# Patient Record
Sex: MALE | Race: WHITE | Employment: FULL TIME | URBAN - METROPOLITAN AREA
[De-identification: names, ages, dates, MRNs, and addresses within clinical notes are randomized per-mention and may not be internally consistent; named-entity substitution may affect disease eponyms.]

---

## 2017-01-16 ENCOUNTER — OFFICE VISIT (OUTPATIENT)
Dept: FAMILY MEDICINE | Facility: CLINIC | Age: 62
End: 2017-01-16
Payer: COMMERCIAL

## 2017-01-16 VITALS
BODY MASS INDEX: 44.71 KG/M2 | OXYGEN SATURATION: 93 % | HEIGHT: 68 IN | SYSTOLIC BLOOD PRESSURE: 135 MMHG | HEART RATE: 88 BPM | DIASTOLIC BLOOD PRESSURE: 88 MMHG | WEIGHT: 295 LBS

## 2017-01-16 DIAGNOSIS — J41.8 MIXED SIMPLE AND MUCOPURULENT CHRONIC BRONCHITIS (H): ICD-10-CM

## 2017-01-16 DIAGNOSIS — I10 BENIGN ESSENTIAL HYPERTENSION: ICD-10-CM

## 2017-01-16 DIAGNOSIS — Z12.11 COLON CANCER SCREENING: ICD-10-CM

## 2017-01-16 DIAGNOSIS — I10 ESSENTIAL HYPERTENSION WITH GOAL BLOOD PRESSURE LESS THAN 140/90: ICD-10-CM

## 2017-01-16 DIAGNOSIS — R80.9 TYPE 2 DIABETES MELLITUS WITH MICROALBUMINURIA, WITHOUT LONG-TERM CURRENT USE OF INSULIN (H): Primary | ICD-10-CM

## 2017-01-16 DIAGNOSIS — R07.89 ATYPICAL CHEST PAIN: ICD-10-CM

## 2017-01-16 DIAGNOSIS — E11.29 TYPE 2 DIABETES MELLITUS WITH MICROALBUMINURIA, WITHOUT LONG-TERM CURRENT USE OF INSULIN (H): Primary | ICD-10-CM

## 2017-01-16 DIAGNOSIS — E78.5 HYPERLIPIDEMIA LDL GOAL <100: ICD-10-CM

## 2017-01-16 DIAGNOSIS — M25.512 CHRONIC LEFT SHOULDER PAIN: ICD-10-CM

## 2017-01-16 DIAGNOSIS — G89.29 CHRONIC LEFT SHOULDER PAIN: ICD-10-CM

## 2017-01-16 LAB
ANION GAP SERPL CALCULATED.3IONS-SCNC: 8 MMOL/L (ref 3–14)
BUN SERPL-MCNC: 12 MG/DL (ref 7–30)
CALCIUM SERPL-MCNC: 8.6 MG/DL (ref 8.5–10.1)
CHLORIDE SERPL-SCNC: 104 MMOL/L (ref 94–109)
CHOLEST SERPL-MCNC: 236 MG/DL
CO2 SERPL-SCNC: 28 MMOL/L (ref 20–32)
CREAT SERPL-MCNC: 0.93 MG/DL (ref 0.66–1.25)
CREAT UR-MCNC: 90 MG/DL
GFR SERPL CREATININE-BSD FRML MDRD: 83 ML/MIN/1.7M2
GLUCOSE SERPL-MCNC: 171 MG/DL (ref 70–99)
HBA1C MFR BLD: 6.6 % (ref 4.3–6)
HDLC SERPL-MCNC: 51 MG/DL
LDLC SERPL CALC-MCNC: ABNORMAL MG/DL
LDLC SERPL DIRECT ASSAY-MCNC: 147 MG/DL
MICROALBUMIN UR-MCNC: 68 MG/L
MICROALBUMIN/CREAT UR: 76 MG/G CR (ref 0–17)
NONHDLC SERPL-MCNC: 185 MG/DL
POTASSIUM SERPL-SCNC: 4.1 MMOL/L (ref 3.4–5.3)
SODIUM SERPL-SCNC: 140 MMOL/L (ref 133–144)
TRIGL SERPL-MCNC: 608 MG/DL

## 2017-01-16 PROCEDURE — 99214 OFFICE O/P EST MOD 30 MIN: CPT | Performed by: PHYSICIAN ASSISTANT

## 2017-01-16 PROCEDURE — 83721 ASSAY OF BLOOD LIPOPROTEIN: CPT | Mod: 59 | Performed by: PHYSICIAN ASSISTANT

## 2017-01-16 PROCEDURE — 80048 BASIC METABOLIC PNL TOTAL CA: CPT | Performed by: PHYSICIAN ASSISTANT

## 2017-01-16 PROCEDURE — 36415 COLL VENOUS BLD VENIPUNCTURE: CPT | Performed by: PHYSICIAN ASSISTANT

## 2017-01-16 PROCEDURE — 82043 UR ALBUMIN QUANTITATIVE: CPT | Performed by: PHYSICIAN ASSISTANT

## 2017-01-16 PROCEDURE — 80061 LIPID PANEL: CPT | Performed by: PHYSICIAN ASSISTANT

## 2017-01-16 PROCEDURE — 83036 HEMOGLOBIN GLYCOSYLATED A1C: CPT | Performed by: PHYSICIAN ASSISTANT

## 2017-01-16 PROCEDURE — 99207 C FOOT EXAM  NO CHARGE: CPT | Performed by: PHYSICIAN ASSISTANT

## 2017-01-16 RX ORDER — LOSARTAN POTASSIUM 100 MG/1
100 TABLET ORAL DAILY
Qty: 90 TABLET | Refills: 1 | Status: SHIPPED | OUTPATIENT
Start: 2017-01-16 | End: 2017-10-31

## 2017-01-16 NOTE — PROGRESS NOTES
SUBJECTIVE:                                                    Cooper Vargas is a 61 year old male who presents to clinic today for the following health issues:      Diabetes Follow-up      Patient is checking blood sugars: not at all    Diabetic concerns: None     Symptoms of hypoglycemia (low blood sugar): none     Paresthesias (numbness or burning in feet) or sores: Yes both     Date of last diabetic eye exam: unsure--does not want referral placed     COPD Follow-Up    Symptoms are currently: stable    Current fatigue or dyspnea with ambulation: none    Shortness of breath: stable    Increased or change in Cough/Sputum: No    Fever(s): No    Baseline ambulation without stopping to rest  feet, . Able to walk up 0 flights of stairs without stopping to rest.    Any ER/UC or hospital admissions since your last visit? No     History   Smoking status     Former Smoker -- 1.00 packs/day for 42 years     Types: Cigarettes     Quit date: 09/29/2015   Smokeless tobacco     Never Used     No results found for this basename: FEV1, OAW1WBA, has seen pulmonology. Copd stable. Inhalers have never been very helpful or too expensive.        Amount of exercise or physical activity: None    Problems taking medications regularly: No    Medication side effects: none  Diet: regular (no restrictions)  Joint Pain     Onset: chronic     Description:   Location: left shoulder  Character: Sharp and Dull ache    Intensity: moderate    Progression of Symptoms: same    Accompanying Signs & Symptoms:  Other symptoms: none   History:   Previous similar pain: YES      Precipitating factors:   Trauma or overuse: no     Alleviating factors:  Improved by: cortisone injection       Therapies Tried and outcome: cortisone injection    Worse with activity. Pain deep within shoulder.  Hypertension Follow-up      Outpatient blood pressures are not being checked.    Low Salt Diet: not monitoring salt     Occasional chest pressure. Long standing LONGORIA.    Problem list and histories reviewed & adjusted, as indicated.  Additional history: as documented    Problem list, Medication list, Allergies, and Medical/Social/Surgical histories reviewed in EPIC and updated as appropriate.    Patient Active Problem List   Diagnosis     GERD (gastroesophageal reflux disease)     Plantar fasciitis     Fatigue     Helicobacter pylori infection     Lyme disease     Smoking     Obesity     Tremors, hands     Hyperlipidemia LDL goal <100     LISETTE (obstructive sleep apnea)-Moderate (AHI 21)     Advanced directives, counseling/discussion     Acute gouty arthritis     Bronchitis, mucopurulent recurrent (H)     Mixed simple and mucopurulent chronic bronchitis (H)     Essential hypertension with goal blood pressure less than 140/90     Type 2 diabetes mellitus with microalbuminuria (H)     Social History   Substance Use Topics     Smoking status: Former Smoker -- 1.00 packs/day for 42 years     Types: Cigarettes     Quit date: 09/29/2015     Smokeless tobacco: Never Used     Alcohol Use: 0.0 oz/week     0 Standard drinks or equivalent per week      Comment: social drinks on the weekends.12 drinks (imelda)weekly     All other systems negative except as outline above  OBJECTIVE:    Eye exam - right eye normal lid, conjunctiva, cornea, pupil and fundus, left eye normal lid, conjunctiva, cornea, pupil and fundus.  Thyroid not palpable, not enlarged, no nodules detected.  CHEST:chest clear to IPPA, no tachypnea, retractions or cyanosis and S1, S2 normal, no murmur, no gallop, rate regular.  Shoulder exam shows positive impingement signs are present with pain at high arc of abduction and forward flexion on left. There is tenderness of the shoulder with movement. Pain is deep.  No edema  Foot exam - both sides normal; no swelling, tenderness or skin or vascular lesions. Color and temperature is normal. Sensation is intact. Peripheral pulses are palpable. Toenails are normal.    Cooper was seen  today for copd, shoulder left and diabetes.    Diagnoses and all orders for this visit:    Type 2 diabetes mellitus with microalbuminuria, without long-term current use of insulin (H)  -     Hemoglobin A1c  -     Albumin Random Urine Quantitative  -     FOOT EXAM    Essential hypertension with goal blood pressure less than 140/90  -     Basic metabolic panel  (Ca, Cl, CO2, Creat, Gluc, K, Na, BUN)    Mixed simple and mucopurulent chronic bronchitis (H)    Hyperlipidemia LDL goal <100  -     Lipid panel reflex to direct LDL    Colon cancer screening  -     GASTROENTEROLOGY ADULT REFERRAL +/- PROCEDURE    Benign essential hypertension  -     losartan (COZAAR) 100 MG tablet; Take 1 tablet (100 mg) by mouth daily    Atypical chest pain  -     Echo Stress Test With Definity; Future    Chronic left shoulder pain  -     MR Shoulder Left w/o & w Contrast; Future      work on lifestyle modification  Recheck in 6 mos

## 2017-01-16 NOTE — MR AVS SNAPSHOT
After Visit Summary   1/16/2017    Cooper Vargas    MRN: [id number]           Patient Information     Date Of Birth          1955        Visit Information        Provider Department      1/16/2017 9:00 AM Max Orantes PA-C [facility] [place]        Today's Diagnoses     Type 2 diabetes mellitus with microalbuminuria, without long-term current use of insulin (H)    -  1     Essential hypertension with goal blood pressure less than 140/90         Mixed simple and mucopurulent chronic bronchitis (H)         Hyperlipidemia LDL goal <100         Colon cancer screening         Benign essential hypertension         Atypical chest pain         Chronic left shoulder pain            Follow-ups after your visit        Additional Services     GASTROENTEROLOGY ADULT REFERRAL +/- PROCEDURE       Your provider has referred you to Gastroenterology Services.    English    Procedure/Referral: PROCEDURE ONLY - COLONOSCOPY - Reason for procedure: Screening  FMG: [facility] [phone]   [email or web address]  Colonoscopy Diagnostic Referral - Special Risks for Colonoscopy Procedures:    History of CHF: No  History of Ascites: No  Taking Blood Thinners: No (Should be off Warfarin x 1 week, ASA off x 2 weeks & Plavix off x 3 weeks)  Prosthetic Heart Valve present: No  History of Endocarditis: No  Major Orthopedic Prosthesis in place: No     Body mass index is 44.86 kg/(m^2).   Diabetic: Yes  Defibrillator: No  Sleep Apnea/CPAP:YES     Please be aware that coverage of these services is subject to the terms and limitations of your health insurance plan.  Call member services at your health plan with any benefit or coverage questions.  Any procedures must be performed at a [place] facility OR coordinated by your clinic's referral office.    Please bring the following with you to your appointment:    (1) Any X-Rays, CTs or MRIs which have been performed.  Contact  "the facility where they were done to arrange for  prior to your scheduled appointment.    (2) List of current medications   (3) This referral request   (4) Any documents/labs given to you for this referral                  Future tests that were ordered for you today     Open Future Orders        Priority Expected Expires Ordered    Echo Stress Test With Definity Routine  2018    MR Shoulder Left w/o & w Contrast Routine  2018            Who to contact     Normal or non-critical lab and imaging results will be communicated to you by STAR FESTIVALhart, letter or phone within 4 business days after the clinic has received the results. If you do not hear from us within 7 days, please contact the clinic through Medxnotet or phone. If you have a critical or abnormal lab result, we will notify you by phone as soon as possible.  Submit refill requests through VocalizeLocal or call your pharmacy and they will forward the refill request to us. Please allow 3 business days for your refill to be completed.          If you need to speak with a  for additional information , please call: [phone]             Additional Information About Your Visit        VocalizeLocal Information     VocalizeLocal lets you send messages to your doctor, view your test results, renew your prescriptions, schedule appointments and more. To sign up, go to [email or web address] . Click on \"Log in\" on the left side of the screen, which will take you to the Welcome page. Then click on \"Sign up Now\" on the right side of the page.     You will be asked to enter the access code listed below, as well as some personal information. Please follow the directions to create your username and password.     Your access code is: [email or web address]  Expires: 2017 10:19 AM     Your access code will  in 90 days. If you need help or a new code, please call your [place] clinic or [phone].        Care EveryWhere ID     This is your " "Care EveryWhere ID. This could be used by other organizations to access your [facility] medical records  [id number]        Your Vitals Were     Pulse Height BMI (Body Mass Index) Pulse Oximetry          88 5' 8\" (1.727 m) 44.86 kg/m2 93%         Blood Pressure from Last 3 Encounters:   01/16/17 135/88   07/19/16 116/80   03/21/16 132/88    Weight from Last 3 Encounters:   01/16/17 295 lb (133.811 kg)   07/19/16 282 lb (127.914 kg)   03/21/16 280 lb (127.007 kg)              We Performed the Following     Albumin Random Urine Quantitative     Basic metabolic panel  (Ca, Cl, CO2, Creat, Gluc, K, Na, BUN)     FOOT EXAM     GASTROENTEROLOGY ADULT REFERRAL +/- PROCEDURE     Hemoglobin A1c     Lipid panel reflex to direct LDL          Today's Medication Changes          These changes are accurate as of: 1/16/17 10:19 AM.  If you have any questions, ask your nurse or doctor.               Stop taking these medicines if you haven't already. Please contact your care team if you have questions.     ipratropium - albuterol 0.5 mg/2.5 mg/3 mL 0.5-2.5 (3) MG/3ML neb solution   Commonly known as:  DUONEB   Stopped by:  Max Orantes PA-C           predniSONE 10 MG tablet   Commonly known as:  DELTASONE   Stopped by:  Max Orantes PA-C                Where to get your medicines      These medications were [facility] [place] [facility] [address] [address] [address]     Phone:  [phone]    - losartan 100 MG tablet             Primary Care Provider Office Phone # Fax #    Max Orantes PA-C [phone]       [facility] [id number] [place] [address] [address]  [place] [address]        Thank you!     Thank you for choosing [facility]  for your care. Our goal is always to provide you with excellent care. Hearing back from our patients is one way we can continue to improve our services. Please take a few minutes to complete the written survey that you " may receive in the mail after your visit with us. Thank you!             Your Updated Medication List - Protect others around you: Learn how to safely use, store and throw away your medicines at www.disposemymeds.org.          This list is accurate as of: 1/16/17 10:19 AM.  Always use your most recent med list.                   Brand Name Dispense Instructions for use    losartan 100 MG tablet    COZAAR    90 tablet    Take 1 tablet (100 mg) by mouth daily

## 2017-01-17 ENCOUNTER — RADIANT APPOINTMENT (OUTPATIENT)
Dept: MRI IMAGING | Facility: CLINIC | Age: 62
End: 2017-01-17
Attending: PHYSICIAN ASSISTANT
Payer: COMMERCIAL

## 2017-01-17 DIAGNOSIS — M25.512 CHRONIC LEFT SHOULDER PAIN: ICD-10-CM

## 2017-01-17 DIAGNOSIS — G89.29 CHRONIC LEFT SHOULDER PAIN: ICD-10-CM

## 2017-01-17 PROCEDURE — 73221 MRI JOINT UPR EXTREM W/O DYE: CPT | Mod: TC

## 2017-01-23 ENCOUNTER — TELEPHONE (OUTPATIENT)
Dept: FAMILY MEDICINE | Facility: CLINIC | Age: 62
End: 2017-01-23

## 2017-01-23 DIAGNOSIS — G47.33 OSA (OBSTRUCTIVE SLEEP APNEA): Primary | ICD-10-CM

## 2017-01-23 NOTE — TELEPHONE ENCOUNTER
Patient states he needs a prescription for hoses and nose guard for his CPAP called in to [phone].    Thank you.

## 2017-01-24 ENCOUNTER — RADIANT APPOINTMENT (OUTPATIENT)
Dept: CARDIOLOGY | Facility: CLINIC | Age: 62
End: 2017-01-24
Attending: PHYSICIAN ASSISTANT
Payer: COMMERCIAL

## 2017-01-24 DIAGNOSIS — R07.89 ATYPICAL CHEST PAIN: ICD-10-CM

## 2017-01-24 PROCEDURE — 93325 DOPPLER ECHO COLOR FLOW MAPG: CPT | Mod: TC | Performed by: INTERNAL MEDICINE

## 2017-01-24 PROCEDURE — 93325 DOPPLER ECHO COLOR FLOW MAPG: CPT | Mod: 26 | Performed by: INTERNAL MEDICINE

## 2017-01-24 PROCEDURE — 93352 ADMIN ECG CONTRAST AGENT: CPT | Performed by: INTERNAL MEDICINE

## 2017-01-24 PROCEDURE — 93017 CV STRESS TEST TRACING ONLY: CPT | Performed by: INTERNAL MEDICINE

## 2017-01-24 PROCEDURE — 93016 CV STRESS TEST SUPVJ ONLY: CPT | Performed by: INTERNAL MEDICINE

## 2017-01-24 PROCEDURE — 93321 DOPPLER ECHO F-UP/LMTD STD: CPT | Mod: TC | Performed by: INTERNAL MEDICINE

## 2017-01-24 PROCEDURE — 93321 DOPPLER ECHO F-UP/LMTD STD: CPT | Mod: 26 | Performed by: INTERNAL MEDICINE

## 2017-01-24 PROCEDURE — 93350 STRESS TTE ONLY: CPT | Mod: 26 | Performed by: INTERNAL MEDICINE

## 2017-01-24 PROCEDURE — 93018 CV STRESS TEST I&R ONLY: CPT | Performed by: INTERNAL MEDICINE

## 2017-01-24 PROCEDURE — 93350 STRESS TTE ONLY: CPT | Mod: TC | Performed by: INTERNAL MEDICINE

## 2017-01-24 RX ADMIN — Medication 5 ML: at 10:30

## 2017-01-30 RX ORDER — ATORVASTATIN CALCIUM 20 MG/1
20 TABLET, FILM COATED ORAL DAILY
Qty: 90 TABLET | Refills: 1 | Status: SHIPPED | OUTPATIENT
Start: 2017-01-30 | End: 2017-02-02 | Stop reason: SINTOL

## 2017-01-31 ENCOUNTER — TRANSFERRED RECORDS (OUTPATIENT)
Dept: HEALTH INFORMATION MANAGEMENT | Facility: CLINIC | Age: 62
End: 2017-01-31

## 2017-02-02 ENCOUNTER — OFFICE VISIT (OUTPATIENT)
Dept: FAMILY MEDICINE | Facility: CLINIC | Age: 62
End: 2017-02-02
Payer: COMMERCIAL

## 2017-02-02 VITALS
WEIGHT: 287 LBS | OXYGEN SATURATION: 94 % | DIASTOLIC BLOOD PRESSURE: 82 MMHG | HEART RATE: 73 BPM | SYSTOLIC BLOOD PRESSURE: 132 MMHG | BODY MASS INDEX: 43.65 KG/M2

## 2017-02-02 DIAGNOSIS — R06.2 WHEEZING: ICD-10-CM

## 2017-02-02 DIAGNOSIS — M25.512 CHRONIC LEFT SHOULDER PAIN: Primary | ICD-10-CM

## 2017-02-02 DIAGNOSIS — G89.29 CHRONIC LEFT SHOULDER PAIN: Primary | ICD-10-CM

## 2017-02-02 PROCEDURE — 36415 COLL VENOUS BLD VENIPUNCTURE: CPT | Performed by: PHYSICIAN ASSISTANT

## 2017-02-02 PROCEDURE — 86003 ALLG SPEC IGE CRUDE XTRC EA: CPT | Performed by: PHYSICIAN ASSISTANT

## 2017-02-02 PROCEDURE — 82785 ASSAY OF IGE: CPT | Performed by: PHYSICIAN ASSISTANT

## 2017-02-02 PROCEDURE — 20610 DRAIN/INJ JOINT/BURSA W/O US: CPT | Mod: LT | Performed by: PHYSICIAN ASSISTANT

## 2017-02-02 PROCEDURE — 99213 OFFICE O/P EST LOW 20 MIN: CPT | Mod: 25 | Performed by: PHYSICIAN ASSISTANT

## 2017-02-02 NOTE — NURSING NOTE
The following medication was given:     MEDICATION: Depo Medrol 40mg  ROUTE: IM  SITE: Left Shoulder  DOSE: 1cc  LOT #: [id number]  :  Luna Lamar  EXPIRATION DATE:  03/2019  NDC#: 6388-4612-58

## 2017-02-02 NOTE — MR AVS SNAPSHOT
After Visit Summary   2/2/2017    Cooper Vargas    MRN: [id number]           Patient Information     Date Of Birth          1955        Visit Information        Provider Department      2/2/2017 7:20 AM Max Orantes PA-C [facility]        Today's Diagnoses     Chronic left shoulder pain    -  1     Wheezing            Follow-ups after your visit        Additional Services     PHYSICAL THERAPY REFERRAL       *This therapy referral will be filtered to a centralized scheduling office at [facility] and the patient will receive a call to schedule an appointment at a [facility] location most convenient for them. *     [facility] provides Physical Therapy evaluation and treatment and many specialty services across the [facility] system.  If requesting a specialty program, please choose from the list below.    Massage myofascial release: liane luke [phone] ([phone])  Treatment: Evaluation & Treatment      Please be aware that coverage of these services is subject to the terms and limitations of your health insurance plan.  Call member services at your health plan with any benefit or coverage questions.      **Note to Provider:  If you are referring outside of [facility] for the therapy appointment, please list the name of the location in the  special instructions  above, print the referral and give to the patient to schedule the appointment.                  Who to contact     Normal or non-critical lab and imaging results will be communicated to you by MyChart, letter or phone within 4 business days after the clinic has received the results. If you do not hear from us within 7 days, please contact the clinic through MyChart or phone. If you have a critical or abnormal lab result, we will notify you by phone as soon as possible.  Submit refill requests through Viacore or call your pharmacy and they will forward the refill request to us.  "Please allow 3 business days for your refill to be completed.          If you need to speak with a  for additional information , please call: [phone]             Additional Information About Your Visit        ScrewpulpharBioVascular Information     Screwpulphart lets you send messages to your doctor, view your test results, renew your prescriptions, schedule appointments and more. To sign up, go to [email or web address]/[employer] . Click on \"Log in\" on the left side of the screen, which will take you to the Welcome page. Then click on \"Sign up Now\" on the right side of the page.     You will be asked to enter the access code listed below, as well as some personal information. Please follow the directions to create your username and password.     Your access code is: [email or web address]  Expires: 2017 10:19 AM     Your access code will  in 90 days. If you need help or a new code, please call your [place] clinic or [phone].        Care EveryWhere ID     This is your Care EveryWhere ID. This could be used by other organizations to access your [facility] medical records  [id number]        Your Vitals Were     Pulse Pulse Oximetry                73 94%           Blood Pressure from Last 3 Encounters:   17 132/82   17 135/88   16 116/80    Weight from Last 3 Encounters:   17 287 lb (130.182 kg)   17 295 lb (133.811 kg)   16 282 lb (127.914 kg)              We Performed the Following     DEPO MEDROL 40 MG     DRAIN/INJECT LARGE JOINT/BURSA     PHYSICAL THERAPY REFERRAL     Respiratory disease profile          Today's Medication Changes          These changes are accurate as of: 17  8:07 AM.  If you have any questions, ask your nurse or doctor.               Stop taking these medicines if you haven't already. Please contact your care team if you have questions.     atorvastatin 20 MG tablet   Commonly known as:  LIPITOR   Stopped by:  Max Orantes PA-C                    " Primary Care Provider Office Phone # Fax #    Max Orantes PA-C [phone]       [facility] [address] [place] [address]        Thank you!     Thank you for choosing [facility]  for your care. Our goal is always to provide you with excellent care. Hearing back from our patients is one way we can continue to improve our services. Please take a few minutes to complete the written survey that you may receive in the mail after your visit with us. Thank you!             Your Updated Medication List - Protect others around you: Learn how to safely use, store and throw away your medicines at www.disposemymeds.org.          This list is accurate as of: 2/2/17  8:07 AM.  Always use your most recent med list.                   Brand Name Dispense Instructions for use    losartan 100 MG tablet    COZAAR    90 tablet    Take 1 tablet (100 mg) by mouth daily       order for DME     1 each    Equipment being ordered: CPAP equipment all supplies especially hoses and nose guards.

## 2017-02-02 NOTE — LETTER
[facility]  [address] [address]  [place] [address]  [phone]        February 27, 2017      Cooper Vargas  [address] [facility] [address]  [facility] [address]        Dear Cooper,      Your allergy profile was mildly positive for a couple of different allergens. You showed a very mild response to ragweed and also to an outdoor mold. I do not believe such sensitivities are influencing your breathing issues on a daily basis, but felt we needed to rule out a connection to allergies, which I feel we have.     Results for orders placed or performed in visit on 02/02/17   Respiratory disease profile   Result Value Ref Range     (H) 0 - 114 KIU/L    Allergen Cat Dander  <0.10 KU(A)/L     <0.10  Interp: Class 0 - Negative, Consider nonallergic causes      Allergen Dog Dander  <0.10 KU(A)/L     <0.10  Interp: Class 0 - Negative, Consider nonallergic causes      Allergen Bentgrass  <0.10 KU(A)/L     <0.10  Interp: Class 0 - Negative, Consider nonallergic causes      Allergen Cocksfoot  <0.10 KU(A)/L     <0.10  Interp: Class 0 - Negative, Consider nonallergic causes      Allergen Cockroach 0.15 (H) <0.10 KU(A)/L    Allergen D farinae  <0.10 KU(A)/L     <0.10  Interp: Class 0 - Negative, Consider nonallergic causes      Allergen A alternata  <0.10 KU(A)/L     <0.10  Interp: Class 0 - Negative, Consider nonallergic causes      Allergen Elm  <0.10 KU(A)/L     <0.10  Interp: Class 0 - Negative, Consider nonallergic causes      Allergen Maple  <0.10 KU(A)/L     <0.10  Interp: Class 0 - Negative, Consider nonallergic causes      Allergen Oak(white)  <0.10 KU(A)/L     <0.10  Interp: Class 0 - Negative, Consider nonallergic causes      Allergn Silver Birch  <0.10 KU(A)/L     <0.10  Interp: Class 0 - Negative, Consider nonallergic causes      Allergen Marshelder  <0.10 KU(A)/L     <0.10  Interp: Class 0 - Negative, Consider nonallergic causes      Allrgn Ragweed-Short 0.12 (H) <0.10 KU(A)/L    Allrgn D  pteronyssin  <0.10 KU(A)/L     <0.10  Interp: Class 0 - Negative, Consider nonallergic causes      Allergen C herbarum  <0.10 KU(A)/L     <0.10  Interp: Class 0 - Negative, Consider nonallergic causes      Allergen A fumigatus 1.00 (H) <0.10 KU(A)/L     If you have any questions or concerns, please call myself or my nurse at [phone].    Sincerely,    Max Orantes PA-C/juanchoo

## 2017-02-02 NOTE — NURSING NOTE
"Chief Complaint   Patient presents with     Imm/Inj     cortisone injection       Initial /82 mmHg  Pulse 73  Wt 287 lb (130.182 kg)  SpO2 94% Estimated body mass index is 43.65 kg/(m^2) as calculated from the following:    Height as of 1/16/17: 5' 8\" (1.727 m).    Weight as of this encounter: 287 lb (130.182 kg).  BP completed using cuff size: Hans Calderon MA  "

## 2017-02-02 NOTE — PROGRESS NOTES
SUBJECTIVE:                                                    Cooper Vargas is a 61 year old male who presents to clinic today for the following health issues:      Cortisone Injection: Left shoulder  Reviewed shoulder mri.  Discuss lab results.  Reviewed cholesterol numbers. Patient not interested in cholesterol meds. Did not tolerate lipitor.   Will try with diet and exercise.   Continues to note some wheezing and chronic rhinitis.   Problem list and histories reviewed & adjusted, as indicated.  Additional history: as documented    Patient Active Problem List   Diagnosis     GERD (gastroesophageal reflux disease)     Plantar fasciitis     Fatigue     Helicobacter pylori infection     Lyme disease     Smoking     Obesity     Tremors, hands     Hyperlipidemia LDL goal <100     LISETTE (obstructive sleep apnea)-Moderate (AHI 21)     Advanced directives, counseling/discussion     Acute gouty arthritis     Bronchitis, mucopurulent recurrent (H)     Mixed simple and mucopurulent chronic bronchitis (H)     Essential hypertension with goal blood pressure less than 140/90     Type 2 diabetes mellitus with microalbuminuria, without long-term current use of insulin (H)     Past Surgical History   Procedure Laterality Date     Orthopedic surgery  2009     Right knee     Orthopedic surgery  1970's     Right ankle     Rib surgery  1982     Top right side rib removed     Knee surgery         Social History   Substance Use Topics     Smoking status: Former Smoker -- 1.00 packs/day for 42 years     Types: Cigarettes     Quit date: 09/29/2015     Smokeless tobacco: Never Used     Alcohol Use: 0.0 oz/week     0 Standard drinks or equivalent per week      Comment: social drinks on the weekends.12 drinks (imelda)weekly     Family History   Problem Relation Age of Onset     DIABETES Paternal Grandmother      Alzheimer Disease Paternal Grandmother      Neurologic Disorder Sister      migraines     Neurologic Disorder Son      Shaking  of hands. Age 30 years         BP Readings from Last 3 Encounters:   02/02/17 132/82   01/16/17 135/88   07/19/16 116/80    Wt Readings from Last 3 Encounters:   02/02/17 287 lb (130.182 kg)   01/16/17 295 lb (133.811 kg)   07/19/16 282 lb (127.914 kg)                    All other systems negative except as outline above  OBJECTIVE:  Shoulder exam shows positive impingement signs are present with pain at high arc of abduction and forward flexion on left. There is tenderness of the lateral shoulder/supraspinatus tendon.  ENT exam reveals - bilateral TM normal without fluid or infection, neck without nodes, throat normal without erythema or exudate, sinuses nontender, post nasal drip noted, nasal mucosa congested and nasal mucosa pale and congested.  CHEST:no tachypnea, retractions or cyanosis, mild expiratory wheezing heard diffusely throughout both lungs and S1, S2 normal, no murmur, no gallop, rate regular.         The risks, benefits and potential complications (including but not limited to, bleeding, infection, pain, scar, damage to adjacent structures, atrophy or necrosis of soft tissue, skin blanching, failure to relieve symptoms) of injection were discussed with the patient. Questions were addressed and answered.The patient elected to proceed. Written informed consent was obtained. The correct procedural site was identified and confirmed. A Left shoulder intraarticular injection was performed using 1mL Depo Medrol 40mg per mL and 2mL (0.25% marcaine) of local anesthetic after sterile prep, to the correct procedural site. Sterile bandaid applied. This was tolerated well by the patient. No apparent complications.Did also discuss that if diabetic, recommend close monitoring of blood sugars over the next week as cortisone injections can temporarily elevate blood sugars.      Cooper was seen today for imm/inj.    Diagnoses and all orders for this visit:    Chronic left shoulder pain  -     PHYSICAL THERAPY  REFERRAL  -     DEPO MEDROL 40 MG  -     DRAIN/INJECT LARGE JOINT/BURSA    Wheezing  -     Respiratory disease profile      Advised supportive and symptomatic treatment.  Follow up with Provider - if condition persists or worsens.

## 2017-02-04 LAB
A ALTERNATA IGE QN: ABNORMAL KU(A)/L
A FUMIGATUS IGE QN: 1 KU(A)/L
C HERBARUM IGE QN: ABNORMAL KU(A)/L
CAT DANDER IGG QN: ABNORMAL KU(A)/L
COCKSFOOT IGE QN: ABNORMAL KU(A)/L
COMMON RAGWEED IGE QN: 0.12 KU(A)/L
D FARINAE IGE QN: ABNORMAL KU(A)/L
D PTERONYSS IGE QN: ABNORMAL KU(A)/L
DOG DANDER+EPITH IGE QN: ABNORMAL KU(A)/L
IGE SERPL-ACNC: 119 KIU/L (ref 0–114)
MAPLE IGE QN: ABNORMAL KU(A)/L
MARSH ELDER IGE QN: ABNORMAL KU(A)/L
RED TOP GRASS IGE QN: ABNORMAL KU(A)/L
ROACH IGE QN: 0.15 KU(A)/L
SILVER BIRCH IGE QN: ABNORMAL KU(A)/L
WHITE ELM IGE QN: ABNORMAL KU(A)/L
WHITE OAK IGE QN: ABNORMAL KU(A)/L

## 2017-02-10 ENCOUNTER — TELEPHONE (OUTPATIENT)
Dept: FAMILY MEDICINE | Facility: CLINIC | Age: 62
End: 2017-02-10

## 2017-03-02 ENCOUNTER — TELEPHONE (OUTPATIENT)
Dept: FAMILY MEDICINE | Facility: CLINIC | Age: 62
End: 2017-03-02

## 2017-03-02 NOTE — TELEPHONE ENCOUNTER
Reason for Call:  Other call back    Detailed comments: pt calling states was seen in clinic with provider Max Orantes on 02/02/17. Pt states wondering what his results were from appt,states never received results. Please advise and contact pt in regards.    Phone Number Patient can be reached at: Home number on file [phone] (home)    Best Time: ANY    Can we leave a detailed message on this number? YES    Call taken on 3/2/2017 at 7:39 AM by Alyce Holly

## 2017-03-02 NOTE — TELEPHONE ENCOUNTER
Stephon,   Your allergy profile was mildly positive for a couple of different allergens. You showed a very mild response to ragweed and also to an outdoor mold. I do not believe such sensitivities are influencing your breathing issues on a daily basis, but felt we needed to rule out a connection to allergies, which i feel we have.     If your have any question regarding these results, feel free to contact me at the clinic or via Trendy Mondayshart.     Max  Reviewed above with pt .

## 2017-03-13 ENCOUNTER — OFFICE VISIT (OUTPATIENT)
Dept: FAMILY MEDICINE | Facility: CLINIC | Age: 62
End: 2017-03-13
Payer: COMMERCIAL

## 2017-03-13 VITALS
DIASTOLIC BLOOD PRESSURE: 98 MMHG | WEIGHT: 286 LBS | SYSTOLIC BLOOD PRESSURE: 140 MMHG | HEIGHT: 68 IN | HEART RATE: 73 BPM | OXYGEN SATURATION: 94 % | RESPIRATION RATE: 18 BRPM | TEMPERATURE: 98.4 F | BODY MASS INDEX: 43.35 KG/M2

## 2017-03-13 DIAGNOSIS — I10 ESSENTIAL HYPERTENSION WITH GOAL BLOOD PRESSURE LESS THAN 140/90: Primary | ICD-10-CM

## 2017-03-13 DIAGNOSIS — M54.41 ACUTE RIGHT-SIDED LOW BACK PAIN WITH RIGHT-SIDED SCIATICA: ICD-10-CM

## 2017-03-13 PROCEDURE — 99214 OFFICE O/P EST MOD 30 MIN: CPT | Performed by: PHYSICIAN ASSISTANT

## 2017-03-13 RX ORDER — AMLODIPINE BESYLATE 5 MG/1
5 TABLET ORAL DAILY
Qty: 30 TABLET | Refills: 1 | Status: SHIPPED | OUTPATIENT
Start: 2017-03-13 | End: 2017-05-12

## 2017-03-13 RX ORDER — HYDROCODONE BITARTRATE AND ACETAMINOPHEN 5; 325 MG/1; MG/1
1-2 TABLET ORAL EVERY 4 HOURS PRN
Qty: 25 TABLET | Refills: 0 | Status: SHIPPED | OUTPATIENT
Start: 2017-03-13 | End: 2017-03-27

## 2017-03-13 RX ORDER — PREDNISONE 20 MG/1
20 TABLET ORAL 2 TIMES DAILY
Qty: 14 TABLET | Refills: 0 | Status: SHIPPED | OUTPATIENT
Start: 2017-03-13 | End: 2017-03-20

## 2017-03-13 ASSESSMENT — PAIN SCALES - GENERAL: PAINLEVEL: MODERATE PAIN (5)

## 2017-03-13 NOTE — PROGRESS NOTES
SUBJECTIVE:                                                    Cooper Vargas is a 61 year old male who presents to clinic today for the following health issues:    Recheck of his blood pressure. Running a little high today.  Back Pain      Duration: 4 months        Specific cause: none    Description:   Location of pain: low back right  Character of pain: sharp and stabbing  Pain radiation:radiates into the right buttocks and radiates into the right leg  New numbness or weakness in legs, not attributed to pain:  no    Intensity: Currently 5/10-in the afternoon--rating 10 in the morning upon waking. Rating 8 in the evenings     History:   Pain interferes with job: Not applicable  History of back problems: sciatica pain in the past  Any previous MRI or X-rays: None  Sees a specialist for back pain:  No  Therapies tried without relief: stretching     Alleviating factors:   Improved by: nothing      Precipitating factors:  Worsened by: Lifting, Bending, Standing, Sitting and Lying Flat    Functional and Psychosocial Screen (Rock STarT Back):      Last 2 scores:     ROCK START BACK TOTAL SCORE 12/6/2013 3/13/2017   Total Score (all 9) 6 6            Accompanying Signs & Symptoms:  Risk of Fracture:  None  Risk of Cauda Equina:  None  Risk of Infection:  None  Risk of Cancer:  None  Risk of Ankylosing Spondylitis:  Onset at age <35, male, AND morning back stiffness. no             Right leg radicular pain to level of knee.   No paresthesias. No weakness. No bowel or bladder dysfunction.              Problem list and histories reviewed & adjusted, as indicated.  Additional history: as documented        Reviewed and updated as needed this visit by clinical staff  Tobacco  Allergies  Meds  Med Hx  Surg Hx  Fam Hx  Soc Hx      Reviewed and updated as needed this visit by Provider         All other systems negative except as outline above  OBJECTIVE:  BACK: Lumbosacral spine area reveals local tenderness.  Painful  and reduced LS ROM noted. Straight leg raise is negative .  DTR's, motor strength and sensation normal, including heel and toe gait.  Perifpheral pulses are palpable.  Hipes and knees have full range of motion without pain.  No abdominal tenderness, mass or organomegaly.  Thyroid not palpable, not enlarged, no nodules detected.  Eye exam - right eye normal lid, conjunctiva, cornea, pupil and fundus, left eye normal lid, conjunctiva, cornea, pupil and fundus.  CHEST:chest clear to IPPA, no tachypnea, retractions or cyanosis and S1, S2 normal, no murmur, no gallop, rate regular.  Cooper was seen today for back pain.    Diagnoses and all orders for this visit:    Essential hypertension with goal blood pressure less than 140/90  -     amLODIPine (NORVASC) 5 MG tablet; Take 1 tablet (5 mg) by mouth daily    Acute right-sided low back pain with right-sided sciatica  -     predniSONE (DELTASONE) 20 MG tablet; Take 1 tablet (20 mg) by mouth 2 times daily for 7 days  -     HYDROcodone-acetaminophen (NORCO) 5-325 MG per tablet; Take 1-2 tablets by mouth every 4 hours as needed for moderate to severe pain  -     LULU PT, HAND, AND CHIROPRACTIC REFERRAL      work on lifestyle modification  F/u for a recheck of blood pressure in 4-6 wks.

## 2017-03-13 NOTE — MR AVS SNAPSHOT
After Visit Summary   3/13/2017    Cooper Vargas    MRN: [id number]           Patient Information     Date Of Birth          1955        Visit Information        Provider Department      3/13/2017 10:20 AM Max Orantes PA-C [facility] [place]        Today's Diagnoses     Essential hypertension with goal blood pressure less than 140/90    -  1    Acute right-sided low back pain with right-sided sciatica           Follow-ups after your visit        Additional Services     LULU PT, HAND, AND CHIROPRACTIC REFERRAL       **This order will print in the [facility] Scheduling Office**    Physical Therapy, Hand Therapy and Chiropractic Care are available through:    *[facility]  *[facility]  *[facility]    Call one number to schedule at any of the above locations: [phone].    Your provider has referred you to: [facility] - PT and/or Chiropractic Care determined by clinical presentation at LULU or FS Initial Visit    Indication/Reason for Referral: Low Back Pain  Onset of Illness: several mos  Therapy Orders: Evaluate and Treat  Special Programs: None      Rock Sub-Score (Q5-9): 3 (03/13/17 1037)  Rock Total Score (all 9): 6 (03/13/17 1037)    Additional Comments for the Therapist or Chiropractor:     Please be aware that coverage of these services is subject to the terms and limitations of your health insurance plan.  Call member services at your health plan with any benefit or coverage questions.      Please bring the following to your appointment:    *Your personal calendar for scheduling future appointments  *Comfortable clothing                  Who to contact     Normal or non-critical lab and imaging results will be communicated to you by MyChart, letter or phone within 4 business days after the clinic has received the results. If you do not hear from us within 7 days, please contact the clinic through Enishhart or  "phone. If you have a critical or abnormal lab result, we will notify you by phone as soon as possible.  Submit refill requests through FeedHenry or call your pharmacy and they will forward the refill request to us. Please allow 3 business days for your refill to be completed.          If you need to speak with a  for additional information , please call: [phone]             Additional Information About Your Visit        FeedHenry Information     FeedHenry lets you send messages to your doctor, view your test results, renew your prescriptions, schedule appointments and more. To sign up, go to [email or web address] . Click on \"Log in\" on the left side of the screen, which will take you to the Welcome page. Then click on \"Sign up Now\" on the right side of the page.     You will be asked to enter the access code listed below, as well as some personal information. Please follow the directions to create your username and password.     Your access code is: [email or web address]  Expires: 2017 11:19 AM     Your access code will  in 90 days. If you need help or a new code, please call your [place] clinic or [phone].        Care EveryWhere ID     This is your Care EveryWhere ID. This could be used by other organizations to access your [place] medical records  [id number]        Your Vitals Were     Pulse Temperature Respirations Height Pulse Oximetry BMI (Body Mass Index)    73 98.4  F (36.9  C) (Tympanic) 18 5' 8\" (1.727 m) 94% 43.49 kg/m2       Blood Pressure from Last 3 Encounters:   17 (!) 140/98   17 132/82   17 135/88    Weight from Last 3 Encounters:   17 286 lb (129.7 kg)   17 287 lb (130.2 kg)   17 295 lb (133.8 kg)              We Performed the Following     LULU PT, HAND, AND CHIROPRACTIC REFERRAL          Today's Medication Changes          These changes are accurate as of: 3/13/17 10:56 AM.  If you have any questions, ask your nurse or doctor.       "         Start taking these medicines.        Dose/Directions    amLODIPine 5 MG tablet   Commonly known as:  NORVASC   Used for:  Essential hypertension with goal blood pressure less than 140/90   Started by:  Max Orantes PA-C        Dose:  5 mg   Take 1 tablet (5 mg) by mouth daily   Quantity:  30 tablet   Refills:  1       HYDROcodone-acetaminophen 5-325 MG per tablet   Commonly known as:  NORCO   Used for:  Acute right-sided low back pain with right-sided sciatica   Started by:  Max Orantes PA-C        Dose:  1-2 tablet   Take 1-2 tablets by mouth every 4 hours as needed for moderate to severe pain   Quantity:  25 tablet   Refills:  0       predniSONE 20 MG tablet   Commonly known as:  DELTASONE   Used for:  Acute right-sided low back pain with right-sided sciatica   Started by:  Max Orantes PA-C        Dose:  20 mg   Take 1 tablet (20 mg) by mouth 2 times daily for 7 days   Quantity:  14 tablet   Refills:  0            Where to get your medicines      These medications were sent to [facility] [place]  [place] [address] [address] [address]     Phone:  [phone]     amLODIPine 5 MG tablet    predniSONE 20 MG tablet         Some of these will need a paper prescription and others can be bought over the counter.  Ask your nurse if you have questions.     Bring a paper prescription for each of these medications     HYDROcodone-acetaminophen 5-325 MG per tablet                Primary Care Provider Office Phone # Fax #    Max Orantes PA-C [phone]       [address]  [place] [address]        Thank you!     Thank you for choosing [facility] [place]  for your care. Our goal is always to provide you with excellent care. Hearing back from our patients is one way we can continue to improve our services. Please take a few minutes to complete the written survey that you may receive in the mail after your  visit with us. Thank you!             Your Updated Medication List - Protect others around you: Learn how to safely use, store and throw away your medicines at www.disposemymeds.org.          This list is accurate as of: 3/13/17 10:56 AM.  Always use your most recent med list.                   Brand Name Dispense Instructions for use    amLODIPine 5 MG tablet    NORVASC    30 tablet    Take 1 tablet (5 mg) by mouth daily       HYDROcodone-acetaminophen 5-325 MG per tablet    NORCO    25 tablet    Take 1-2 tablets by mouth every 4 hours as needed for moderate to severe pain       losartan 100 MG tablet    COZAAR    90 tablet    Take 1 tablet (100 mg) by mouth daily       order for DME     1 each    Equipment being ordered: CPAP equipment all supplies especially hoses and nose guards.       predniSONE 20 MG tablet    DELTASONE    14 tablet    Take 1 tablet (20 mg) by mouth 2 times daily for 7 days

## 2017-03-14 ENCOUNTER — THERAPY VISIT (OUTPATIENT)
Dept: PHYSICAL THERAPY | Facility: CLINIC | Age: 62
End: 2017-03-14
Payer: COMMERCIAL

## 2017-03-14 DIAGNOSIS — M54.41 MIDLINE LOW BACK PAIN WITH RIGHT-SIDED SCIATICA: Primary | ICD-10-CM

## 2017-03-14 PROCEDURE — 97161 PT EVAL LOW COMPLEX 20 MIN: CPT | Mod: GP | Performed by: PHYSICAL THERAPIST

## 2017-03-14 PROCEDURE — 97110 THERAPEUTIC EXERCISES: CPT | Mod: GP | Performed by: PHYSICAL THERAPIST

## 2017-03-14 NOTE — MR AVS SNAPSHOT
"              After Visit Summary   3/14/2017    Cooper Vargas    MRN: [id number]           Patient Information     Date Of Birth          1955        Visit Information        Provider Department      3/14/2017 7:30 AM Max Clemons PT [facility] [place] [facility]        Today's Diagnoses     Midline low back pain with right-sided sciatica    -  1       Follow-ups after your visit        Who to contact     If you have questions or need follow up information about today's clinic visit or your schedule please contact [facility] [facility] [place] [facility] directly at [phone].  Normal or non-critical lab and imaging results will be communicated to you by Keelrhart, letter or phone within 4 business days after the clinic has received the results. If you do not hear from us within 7 days, please contact the clinic through Keelrhart or phone. If you have a critical or abnormal lab result, we will notify you by phone as soon as possible.  Submit refill requests through [employer] or call your pharmacy and they will forward the refill request to us. Please allow 3 business days for your refill to be completed.          Additional Information About Your Visit        MyChart Information     [employer] lets you send messages to your doctor, view your test results, renew your prescriptions, schedule appointments and more. To sign up, go to [email or web address] . Click on \"Log in\" on the left side of the screen, which will take you to the Welcome page. Then click on \"Sign up Now\" on the right side of the page.     You will be asked to enter the access code listed below, as well as some personal information. Please follow the directions to create your username and password.     Your access code is: [email or web address]  Expires: 2017 11:19 AM     Your access code will  in 90 days. If you need help or a new code, please call your [place] clinic or [phone].        Care EveryWhere ID     This is your " Care EveryWhere ID. This could be used by other organizations to access your [place] medical records  [id number]         Blood Pressure from Last 3 Encounters:   03/13/17 (!) 140/98   02/02/17 132/82   01/16/17 135/88    Weight from Last 3 Encounters:   03/13/17 129.7 kg (286 lb)   02/02/17 130.2 kg (287 lb)   01/16/17 133.8 kg (295 lb)              We Performed the Following     LULU Inital Eval Report     PT Eval, Low Complexity (666[address])     Therapeutic Exercises        Primary Care Provider Office Phone # Fax #    Max Orantes PA-C [phone]       [facility] [place] [address] [place] [address]        Thank you!     Thank you for choosing [facility]  for your care. Our goal is always to provide you with excellent care. Hearing back from our patients is one way we can continue to improve our services. Please take a few minutes to complete the written survey that you may receive in the mail after your visit with us. Thank you!             Your Updated Medication List - Protect others around you: Learn how to safely use, store and throw away your medicines at www.disposemymeds.org.          This list is accurate as of: 3/14/17  4:04 PM.  Always use your most recent med list.                   Brand Name Dispense Instructions for use    amLODIPine 5 MG tablet    NORVASC    30 tablet    Take 1 tablet (5 mg) by mouth daily       HYDROcodone-acetaminophen 5-325 MG per tablet    NORCO    25 tablet    Take 1-2 tablets by mouth every 4 hours as needed for moderate to severe pain       losartan 100 MG tablet    COZAAR    90 tablet    Take 1 tablet (100 mg) by mouth daily       order for DME     1 each    Equipment being ordered: CPAP equipment all supplies especially hoses and nose guards.       predniSONE 20 MG tablet    DELTASONE    14 tablet    Take 1 tablet (20 mg) by mouth 2 times daily for 7 days

## 2017-03-14 NOTE — PROGRESS NOTES
[facility] Initial Evaluation    Subjective:    Cooper Vargas is a 61 year old male with a lumbar condition.  Condition occurred with:  Insidious onset.  Condition occurred: for unknown reasons.  This is a recurrent condition  Low back pain started insidiously about 4-5 months ago and has been getting worse ever since.  Has had similar back pain about 4 years ago that improved with PT.  Symptoms radiate into R LE, all the way into foot at times.  Visited MD on 3/13/17.    Patient reports pain:  Central lumbar spine.  Radiates to:  Gluteals right, lower leg right and foot right.  Pain is described as sharp and shooting and is constant and reported as 10/10 (in AM).  Associated symptoms:  Tingling, loss of motion/stiffness, loss of strength and loss of motion. Pain is worse in the A.M..  Symptoms are exacerbated by bending, sitting and other (driving) Relieved by: reports previous back pain helped with traction and heat.  Since onset symptoms are gradually worsening.  Special tests:  MRI (MRI with back pain 4yr ago found stenosis and disc degeneration).  Previous treatment includes physical therapy.  There was significant improvement following previous treatment.  General health as reported by patient is fair.  Pertinent medical history includes:  High blood pressure, overweight and sleep disorder/apnea (COPD).  Medical allergies: yes (Penicillin).    Current medications:  Muscle relaxants and high blood pressure medication.  Current occupation is Currently taking time off for work due to COPD; used to work in sales.    Primary job tasks include:  Prolonged sitting and driving.    Barriers include:  None as reported by the patient.    Red flags:  Pain at rest/night (due to positioning but can get back to sleep).                      Objective:    Standing Alignment:    Cervical/Thoracic:  Forward head  Shoulder/UE:  Rounded shoulders  Lumbar:  Lordosis decr                Flexibility/Screens:        Lower Extremity:  Decreased left lower extremity flexibility:Hamstrings    Decreased right lower extremity flexibility:  Hamstrings               Lumbar/SI Evaluation  ROM:    AROM Lumbar:   Flexion:          50% of normal with increased LE symptoms  Ext:                    10% of normal   Side Bend:        Left:  50% of normal    Right:  50% of normal  Rotation:           Left:     Right:   Side Glide:        Left:     Right:                   Neural Tension/Mobility:      Left side:SLR  negative.     Right side:   SLR  negative.   Lumbar Palpation:  Palpation (lumbar): slight tenderness on R piriformis.    Tenderness not present at Left:    Piriformis or Gluteus Medius  Tenderness present at Right: Piriformis  Tenderness not present at Right:  Gluteus Medius      Spinal Segmental Conclusions: Dec PA mobility with pain L3-L5                                            Hip Evaluation  HIP AROM:  AROM:    Left Hip:     Normal    Right Hip:   Normal                    Hip Strength:    Flexion:   Left: 4+/5   -  Pain:  Right: 4+/5   +  Pain:                    Extension:  Left: 3+/5  -  Pain:Right: 3+/5    -  Pain:    Abduction:  Left: 3+/5    -   Pain:Right: 3+/5   -   Pain:        Knee Flexion:  Left: 5/5   -  Pain:Right: 5/5   -  Pain:  Knee Extension:  Left: 5/5   -  Pain:Right: 5/5    -  Pain:                       General     ROS    Assessment/Plan:      Patient is a 61 year old male with lumbar complaints.    Patient has the following significant findings with corresponding treatment plan.                Diagnosis 1:  Low back pain with radiating symptoms into R LE Pain -  manual therapy, self management, education, directional preference exercise and home program  Decreased ROM/flexibility - manual therapy, therapeutic exercise, therapeutic activity and home program  Decreased strength - therapeutic exercise, therapeutic activities and home program  Impaired muscle performance - neuro re-education and home  program  Decreased function - therapeutic activities and home program  Impaired posture - neuro re-education, therapeutic activities and home program    Therapy Evaluation Codes:   1) History comprised of:   Personal factors that impact the plan of care:      None.    Comorbidity factors that impact the plan of care are:      High blood pressure, Overweight, Pain at night/rest and Sleep disorder/apnea.     Medications impacting care: High blood pressure and muscle relaxants.  2) Examination of Body Systems comprised of:   Body structures and functions that impact the plan of care:      Lumbar spine.   Activity limitations that impact the plan of care are:      Bathing, Bending, Dressing, Lifting, Sitting, Standing, Walking and Sleeping.  3) Clinical presentation characteristics are:   Stable/Uncomplicated.  4) Decision-Making    Low complexity using standardized patient assessment instrument and/or measureable assessment of functional outcome.  Cumulative Therapy Evaluation is: Low complexity.    Previous and current functional limitations:  (See Goal Flow Sheet for this information)    Short term and Long term goals: (See Goal Flow Sheet for this information)     Communication ability:  Patient appears to be able to clearly communicate and understand verbal and written communication and follow directions correctly.  Treatment Explanation - The following has been discussed with the patient:   RX ordered/plan of care  Anticipated outcomes  Possible risks and side effects  This patient would benefit from PT intervention to resume normal activities.   Rehab potential is fair.    Frequency:  1 X week, once daily  Duration:  for 6 weeks  Discharge Plan:  Achieve all LTG.  Independent in home treatment program.  Reach maximal therapeutic benefit.    Please refer to the daily flowsheet for treatment today, total treatment time and time spent performing 1:1 timed codes.

## 2017-03-20 ENCOUNTER — TELEPHONE (OUTPATIENT)
Dept: FAMILY MEDICINE | Facility: CLINIC | Age: 62
End: 2017-03-20

## 2017-03-20 NOTE — TELEPHONE ENCOUNTER
Patient calling to request a referral be faxed to the Pain Center in [place], attn: Leonela Casanova at [phone]

## 2017-03-21 ENCOUNTER — TELEPHONE (OUTPATIENT)
Dept: FAMILY MEDICINE | Facility: CLINIC | Age: 62
End: 2017-03-21

## 2017-03-21 NOTE — TELEPHONE ENCOUNTER
Joyce from pain clinic is requesting the last 3 office notes with imaging reports be fax to [phone] this is needed to set patient up for appointment  Thank you

## 2017-03-22 ENCOUNTER — TELEPHONE (OUTPATIENT)
Dept: FAMILY MEDICINE | Facility: CLINIC | Age: 62
End: 2017-03-22

## 2017-03-22 DIAGNOSIS — M54.41 ACUTE MIDLINE LOW BACK PAIN WITH RIGHT-SIDED SCIATICA: Primary | ICD-10-CM

## 2017-03-22 NOTE — TELEPHONE ENCOUNTER
Patient calling looking for a referral for a chiropractor and a massage therapist, for his sciatica.

## 2017-03-22 NOTE — TELEPHONE ENCOUNTER
Patient would like to see chiropractor at [facility]-will discuss massage therapy then. Most likely not covered by insurance. Order is pended for review, will call patient when done.

## 2017-03-27 ENCOUNTER — TELEPHONE (OUTPATIENT)
Dept: FAMILY MEDICINE | Facility: CLINIC | Age: 62
End: 2017-03-27

## 2017-03-27 ENCOUNTER — OFFICE VISIT (OUTPATIENT)
Dept: FAMILY MEDICINE | Facility: CLINIC | Age: 62
End: 2017-03-27
Payer: COMMERCIAL

## 2017-03-27 VITALS
TEMPERATURE: 97.9 F | HEART RATE: 77 BPM | BODY MASS INDEX: 42.86 KG/M2 | SYSTOLIC BLOOD PRESSURE: 139 MMHG | DIASTOLIC BLOOD PRESSURE: 81 MMHG | WEIGHT: 282.8 LBS | HEIGHT: 68 IN | OXYGEN SATURATION: 96 %

## 2017-03-27 DIAGNOSIS — M54.16 LUMBAR RADICULOPATHY: Primary | ICD-10-CM

## 2017-03-27 DIAGNOSIS — M53.9 MULTILEVEL DEGENERATIVE DISC DISEASE: ICD-10-CM

## 2017-03-27 PROCEDURE — 99214 OFFICE O/P EST MOD 30 MIN: CPT | Performed by: FAMILY MEDICINE

## 2017-03-27 RX ORDER — GABAPENTIN 300 MG/1
300 CAPSULE ORAL 3 TIMES DAILY
Qty: 270 CAPSULE | Refills: 0 | Status: SHIPPED | OUTPATIENT
Start: 2017-03-27 | End: 2017-10-31

## 2017-03-27 RX ORDER — CYCLOBENZAPRINE HCL 10 MG
5-10 TABLET ORAL 3 TIMES DAILY PRN
Qty: 30 TABLET | Refills: 0 | Status: SHIPPED | OUTPATIENT
Start: 2017-03-27 | End: 2017-04-03

## 2017-03-27 NOTE — TELEPHONE ENCOUNTER
"Patient is calling stating \"lets go ahead and set up MRI\". Please call to discuss. Thank  You.  "

## 2017-03-27 NOTE — TELEPHONE ENCOUNTER
Follow up in 2 weeks if symptoms fail to improve and will consider Lumbar Spine MRI and referral to Pain clinic for [place]. Patient verbalized understanding and was agreeable with the plan. .

## 2017-03-27 NOTE — PATIENT INSTRUCTIONS
"  * Sciatica    Sciatica (\"Lumbar Radiculopathy\") causes a pain that spreads from the lower back down into the buttock, hip and leg. Sometimes leg pain can occur without any back pain. Sciatica is due to irritation or pressure on a spinal nerve as it comes out of the spinal canal. This is most often due to a bulge or rupture of a nearby spinal disk (the cartilage cushion between each spinal bone), which presses on a nearby nerve. Other causes include spinal stenosis (narrowing of the spinal canal) and spasm of the piriformis muscle (a muscle in the buttocks that the sciatic nerve passes through).  Sciatica may begin after a sudden twisting/bending force (such as in a car accident), or sometimes after a simple awkward movement. In either case, muscle spasm is commonly present and contributes to the pain.  The diagnosis of sciatica is made from the symptoms and physical exam. Unless you had a physical injury (such as a car accident or fall), X-rays are usually not ordered for the initial evaluation of sciatica because the nerves and disks cannot be seen on an X-ray. Most sciatica (80-90%) gets better with time.  What can I do about my low back pain?  There are three main things you can do to ease low back pain and help it go away.    Use heat or cold packs.    Take medicine as directed.    Use positions, movements and exercises. Stay active! Too much rest can make your symptoms worse.  Using heat or cold packs  Try cold packs or gentle heat to ease your pain. Use whichever gives the most relief. Apply the cold pack or heat for 15 minutes at a time, as often as needed.  Taking medicine  If taking over-the-counter medicine:    Take ibuprofen (Advil, Motrin) 600 mg. three times a day as needed for pain.  OR    Take Aleve (naproxen sodium) 220 to 440 mg. two times a day as needed for pain  If your doctor prescribed a muscle relaxant (cyclobenzapine 10 mg.):    Take one half ( ) to 1 tablet at bedtime    Do not drive when " taking this medicine. This drug may make you sleepy.  Using positions, movements and exercises  Research tells us that moving your joints and muscles can help you recover from back pain. Such activity should be simple and gentle.  Use the positions below as well as walking to help relieve your discomfort. Try taking a short walk every 3 to 4 hours during the day. Walk for a few minutes inside your home or take longer walks outside, on a treadmill or at a mall. Slowly increase the amount of time you walk. Expect discomfort when you begin, but it should lessen as your back starts to recover.  Finding a position that is comfortable  When your back pain is new, you may find that certain positions will ease your pain. Gently try each of the following positions until you find one that eases your pain. Once you find a position of comfort, use it as often as you like while you recover. Return to your daily routine as soon as possible.     Lie on your back with your legs bent. You can do this by placing a pillow under your knees or lie on the floor and rest your lower legs on the seat of a chair.    Lie on your side with your knees bent and place a pillow between your knees.    Lie on your stomach over pillows.  When should I call my doctor?  Your back pain should improve over the first couple of weeks. As it improves, you should be able to return to your normal activities. But call your doctor if:    You have a sudden change in your ability to control? your bladder or bowels.    You begin to feel tingling in your groin or legs.    The pain spreads down your leg and into your foot.    Your toes, feet or leg muscles begin to feel weak.    You feel generally unwell or sick.    Your pain gets worse.    2949-7386 [employer]. [address] [place] [address], [place] [address]. All rights reserved. This information is not intended as a substitute for professional medical care. Always follow your healthcare professional's  instructions.

## 2017-03-27 NOTE — NURSING NOTE
"Chief Complaint   Patient presents with     Back Pain       Initial /81  Pulse 77  Temp 97.9  F (36.6  C) (Tympanic)  Ht 5' 8\" (1.727 m)  Wt 282 lb 12.8 oz (128.3 kg)  SpO2 96%  BMI 43 kg/m2 Estimated body mass index is 43 kg/(m^2) as calculated from the following:    Height as of this encounter: 5' 8\" (1.727 m).    Weight as of this encounter: 282 lb 12.8 oz (128.3 kg).  Medication Reconciliation: complete     Alma Soto MA      "

## 2017-03-27 NOTE — PROGRESS NOTES
SUBJECTIVE:                                                    Cooper Vargas is a 61 year old male who presents to clinic today for the following health issues:      Back Pain      Duration: x2 months         Specific cause: none    Description:   Location of pain: low back right  Character of pain: sharp and dull ache  Pain radiation:radiates into the right leg to ankle  New numbness or weakness in legs, not attributed to pain:  no     Intensity: Currently 12/10 for the past x3 days pain has gotten worse    History:   Pain interferes with job: Not applicable  History of back problems: right sciatica pain   Any previous MRI or X-rays: Yes- at [place].  Date 2013  Sees a specialist for back pain:  No  Therapies tried without relief: none    Alleviating factors:   Improved by: none      Precipitating factors:  Worsened by: everything    Functional and Psychosocial Screen (Rock STarT Back):      Most recent score:    ROCK START BACK TOTAL SCORE 3/27/2017   Total Score (all 9) 7          Accompanying Signs & Symptoms:  Risk of Fracture:  None  Risk of Cauda Equina:  None  Risk of Infection:  None  Risk of Cancer:  None  Risk of Ankylosing Spondylitis:  Onset at age <35, male, AND morning back stiffness. no                          States that he has an up coming appointment with a Chiropractor.    Problem list and histories reviewed & adjusted, as indicated.  Additional history: as documented    Current Outpatient Prescriptions   Medication Sig Dispense Refill     cyclobenzaprine (FLEXERIL) 10 MG tablet Take 0.5-1 tablets (5-10 mg) by mouth 3 times daily as needed for muscle spasms 30 tablet 0     gabapentin (NEURONTIN) 300 MG capsule Take 1 capsule (300 mg) by mouth 3 times daily Start 300 mg PO x day 1 then BID on day 2 then TID thereafter. 270 capsule 0     amLODIPine (NORVASC) 5 MG tablet Take 1 tablet (5 mg) by mouth daily 30 tablet 1     losartan (COZAAR) 100 MG tablet Take 1 tablet (100 mg) by mouth daily  "90 tablet 1     order for DME Equipment being ordered: CPAP equipment all supplies especially hoses and nose guards. (Patient not taking: Reported on 3/27/2017) 1 each 1     Allergies   Allergen Reactions     Penicillins        Reviewed and updated as needed this visit by clinical staff       Reviewed and updated as needed this visit by Provider         ROS:  Constitutional, HEENT, cardiovascular, pulmonary, gi and gu systems are negative, except as otherwise noted.    OBJECTIVE:                                                    /81  Pulse 77  Temp 97.9  F (36.6  C) (Tympanic)  Ht 5' 8\" (1.727 m)  Wt 282 lb 12.8 oz (128.3 kg)  SpO2 96%  BMI 43 kg/m2  Body mass index is 43 kg/(m^2).  GENERAL: healthy, alert and no distress  MS: no gross musculoskeletal defects noted, no edema  Comprehensive back pain exam:  Tenderness of right lumbarsacral area, Range of motion not limited by pain, Lower extremity strength functional and equal on both sides, Lower extremity reflexes within normal limits bilaterally, Lower extremity sensation normal and equal on both sides and Straight leg positive on  right, indicating possible ipsilateral radiculopathy  PSYCH: mentation appears normal, affect normal/bright    Diagnostic Test Results:  Previous imaging studies from 11/2013 reviewed  MR LUMBAR SPINE WITHOUT CONTRAST 11/18/2013 3:38 PM     HISTORY: Back pain radiating down the right leg.      COMPARISON: MRI from 11/4/2013.     TECHNIQUE: Routine multiplanar, multisequence imaging was performed.     FINDINGS: Sagittal images demonstrate normal vertebral body height.  There are scattered benign hemangiomas identified. Bone marrow signal  is otherwise unremarkable. Tip of the conus medullaris is normal at  the L1 level. Axial scans were performed from L2 to sacrum. Disc  levels above this are unremarkable on sagittal imaging.     L2-L3: Mild disc bulge. No stenosis.     L3-L4: Mild disc bulge. No stenosis. Mild right greater " than left  facet degenerative changes.     L4-L5: Moderate facet hypertrophy. In addition to broad-based disc  bulge there is a small to moderate size right central disc extrusion  best seen on axial image 17 series 7 and sagittal image 8 series 4.  There is moderate central stenosis. Prominent right subarticular  stenosis with compression of the descending right L5 nerve root.  Moderate right and mild to moderate left foraminal stenosis.     L5-S1: Moderate facet degenerative changes. Very mild disc bulge. Mild  bilateral foraminal stenosis.     IMPRESSION  IMPRESSION:  1. At L4-L5 there is broad-based disc bulge with a small to moderate  size right central disc extrusion. There is moderate central stenosis  with prominent right subarticular stenosis. This results in probable  compression of the descending right L5 nerve root. Moderate right and  mild to moderate left foraminal stenosis.  2. At L5-S1 there is mild bilateral foraminal stenosis. See above for  details at each level.      BENNY SALOMON MD     ASSESSMENT/PLAN:                                                    Cooper was seen today for back pain.    Diagnoses and all orders for this visit:    Lumbar radiculopathy  -     cyclobenzaprine (FLEXERIL) 10 MG tablet; Take 0.5-1 tablets (5-10 mg) by mouth 3 times daily as needed for muscle spasms  -     gabapentin (NEURONTIN) 300 MG capsule; Take 1 capsule (300 mg) by mouth 3 times daily Start 300 mg PO x day 1 then BID on day 2 then TID thereafter.         -   Follow up with the Chiropractor as scheduled.     Multilevel degenerative disc disease    Patient education and Handout given      Follow up in 2 weeks if symptoms fail to improve and will consider Lumbar Spine MRI and referral to Pain clinic for [place]. Patient verbalized understanding and was agreeable with the plan. .      Yumiko Wilson MD  [facility]

## 2017-03-28 ENCOUNTER — RADIANT APPOINTMENT (OUTPATIENT)
Dept: MRI IMAGING | Facility: CLINIC | Age: 62
End: 2017-03-28
Attending: FAMILY MEDICINE
Payer: COMMERCIAL

## 2017-03-28 DIAGNOSIS — M54.16 LUMBAR RADICULOPATHY: ICD-10-CM

## 2017-03-28 PROCEDURE — 72148 MRI LUMBAR SPINE W/O DYE: CPT | Mod: TC

## 2017-03-31 ENCOUNTER — TELEPHONE (OUTPATIENT)
Dept: FAMILY MEDICINE | Facility: CLINIC | Age: 62
End: 2017-03-31

## 2017-03-31 DIAGNOSIS — M54.16 LUMBAR RADICULOPATHY: Primary | ICD-10-CM

## 2017-03-31 NOTE — TELEPHONE ENCOUNTER
Patient would like to know if you would recommend a cortisone shot for his back after the MRI results.  Please call.    Thank you.

## 2017-04-03 DIAGNOSIS — M54.16 LUMBAR RADICULOPATHY: ICD-10-CM

## 2017-04-03 NOTE — TELEPHONE ENCOUNTER
Review of meds just filled on 3 -27,call to pt he states he is wanting vicodin and needs more muscle relaxer because he was only given 10 days worth   meds teed up

## 2017-04-03 NOTE — TELEPHONE ENCOUNTER
i'd recommend he see a spine specialist and discuss his options. Will place a referral . Give him contact info to schedule an appt.

## 2017-04-03 NOTE — TELEPHONE ENCOUNTER
Need to refill pain medication and muscle relaxers.  Not sure names of the meds?  Need to pick these up by Thursday.

## 2017-04-04 RX ORDER — HYDROCODONE BITARTRATE AND ACETAMINOPHEN 5; 325 MG/1; MG/1
1 TABLET ORAL EVERY 4 HOURS PRN
Qty: 20 TABLET | Refills: 0 | Status: SHIPPED | OUTPATIENT
Start: 2017-04-04 | End: 2017-10-31

## 2017-04-04 RX ORDER — CYCLOBENZAPRINE HCL 10 MG
5-10 TABLET ORAL 3 TIMES DAILY PRN
Qty: 30 TABLET | Refills: 0 | Status: SHIPPED | OUTPATIENT
Start: 2017-04-04 | End: 2017-10-31

## 2017-04-04 NOTE — TELEPHONE ENCOUNTER
Hard copy of script for [place] brought to [facility] pharmacy. Patient has appointment with Neurosurgery on 4/18/17 for his lower back pain

## 2017-04-05 ENCOUNTER — OFFICE VISIT (OUTPATIENT)
Dept: FAMILY MEDICINE | Facility: CLINIC | Age: 62
End: 2017-04-05
Payer: COMMERCIAL

## 2017-04-05 VITALS
HEIGHT: 68 IN | BODY MASS INDEX: 42.95 KG/M2 | OXYGEN SATURATION: 96 % | HEART RATE: 117 BPM | SYSTOLIC BLOOD PRESSURE: 133 MMHG | WEIGHT: 283.4 LBS | DIASTOLIC BLOOD PRESSURE: 87 MMHG | TEMPERATURE: 96.7 F

## 2017-04-05 DIAGNOSIS — E78.5 HYPERLIPIDEMIA LDL GOAL <100: ICD-10-CM

## 2017-04-05 DIAGNOSIS — E11.65 TYPE 2 DIABETES MELLITUS WITH HYPERGLYCEMIA, WITHOUT LONG-TERM CURRENT USE OF INSULIN (H): ICD-10-CM

## 2017-04-05 DIAGNOSIS — M54.16 LUMBAR RADICULOPATHY: Primary | ICD-10-CM

## 2017-04-05 PROCEDURE — 99213 OFFICE O/P EST LOW 20 MIN: CPT | Performed by: FAMILY MEDICINE

## 2017-04-05 NOTE — NURSING NOTE
"Chief Complaint   Patient presents with     Back Pain       Initial /87  Pulse 117  Temp 96.7  F (35.9  C) (Tympanic)  Ht 5' 8\" (1.727 m)  Wt 283 lb 6.4 oz (128.5 kg)  SpO2 96%  BMI 43.09 kg/m2 Estimated body mass index is 43.09 kg/(m^2) as calculated from the following:    Height as of this encounter: 5' 8\" (1.727 m).    Weight as of this encounter: 283 lb 6.4 oz (128.5 kg).  Medication Reconciliation: complete     Alma Soto MA    "

## 2017-04-05 NOTE — MR AVS SNAPSHOT
"              After Visit Summary   4/5/2017    Cooper Vargas    MRN: [id number]           Patient Information     Date Of Birth          1955        Visit Information        Provider Department      4/5/2017 10:00 AM Yumiko Wilson MD [facility]        Today's Diagnoses     Lumbar radiculopathy    -  1    Type 2 diabetes mellitus with hyperglycemia, without long-term current use of insulin (H)        Hyperlipidemia LDL goal <100           Follow-ups after your visit        Follow-up notes from your care team     Return if symptoms worsen or fail to improve.      Your next 10 appointments already scheduled     Apr 18, 2017  9:00 AM CDT   New Visit with Cadence Glez NP   [facility] ([facility]    [address] [place] [address] [id number] [phone]              Who to contact     Normal or non-critical lab and imaging results will be communicated to you by MyChart, letter or phone within 4 business days after the clinic has received the results. If you do not hear from us within 7 days, please contact the clinic through MyChart or phone. If you have a critical or abnormal lab result, we will notify you by phone as soon as possible.  Submit refill requests through [employer] or call your pharmacy and they will forward the refill request to us. Please allow 3 business days for your refill to be completed.          If you need to speak with a  for additional information , please call: [phone]             Additional Information About Your Visit        [employer] Information     [employer] lets you send messages to your doctor, view your test results, renew your prescriptions, schedule appointments and more. To sign up, go to [email or web address] . Click on \"Log in\" on the left side of the screen, which will take you to the Welcome page. Then click on \"Sign up Now\" on the right side of the page.     You will be asked to enter the access " code listed below, as well as some personal information. Please follow the directions to create your username and password.     Your access code is: [email or web address]  Expires: 2017 11:19 AM     Your access code will  in 90 days. If you need help or a new code, please call your [facility] or [phone].        Care EveryWhere ID     This is your Care EveryWhere ID. This could be used by other organizations to access your [facility] medical records  [id number]         Blood Pressure from Last 3 Encounters:   17 139/81   17 (!) 140/98   17 132/82    Weight from Last 3 Encounters:   17 282 lb 12.8 oz (128.3 kg)   17 286 lb (129.7 kg)   17 287 lb (130.2 kg)              Today, you had the following     No orders found for display       Primary Care Provider Office Phone # Fax #    Max Orantes PA-C [phone]       [facility] [place] [address] [facility] [address]        Thank you!     Thank you for choosing [facility]  for your care. Our goal is always to provide you with excellent care. Hearing back from our patients is one way we can continue to improve our services. Please take a few minutes to complete the written survey that you may receive in the mail after your visit with us. Thank you!             Your Updated Medication List - Protect others around you: Learn how to safely use, store and throw away your medicines at www.disposemymeds.org.          This list is accurate as of: 17 10:19 AM.  Always use your most recent med list.                   Brand Name Dispense Instructions for use    amLODIPine 5 MG tablet    NORVASC    30 tablet    Take 1 tablet (5 mg) by mouth daily       cyclobenzaprine 10 MG tablet    FLEXERIL    30 tablet    Take 0.5-1 tablets (5-10 mg) by mouth 3 times daily as needed for muscle spasms       gabapentin 300 MG capsule    NEURONTIN    270 capsule    Take 1 capsule (300 mg) by mouth 3 times daily  Start 300 mg PO x day 1 then BID on day 2 then TID thereafter.       HYDROcodone-acetaminophen 5-325 MG per tablet    NORCO    20 tablet    Take 1 tablet by mouth every 4 hours as needed for pain       losartan 100 MG tablet    COZAAR    90 tablet    Take 1 tablet (100 mg) by mouth daily       order for DME     1 each    Equipment being ordered: CPAP equipment all supplies especially hoses and nose guards.

## 2017-04-05 NOTE — PROGRESS NOTES
SUBJECTIVE:                                                    Cooper Vargas is a 61 year old male who presents to clinic today for the following health issues:      Follow up- Lumbar Radiculopathy  Pain has worsened since 4/1/17.    Was doing some yard work and thinks this may have caused the increase of pain.  Radiation of pain starting from right lower back through right ankle.  Also reporting tingling, numbness, and weakness of right leg.   Does not feel the Gabapentin is helping with pain- would like to discuss other options.    Was told by chiropractor there is nothing more they can do for the back pain.   Appointment with neurosurgeon 4/18/17.    Rock STarT Back    Last 2 scores:     ROCK START BACK TOTAL SCORE 3/27/2017 4/5/2017   Total Score (all 9) 7 6     Hydrocodone and Flexeril was filled by Max Orantes yesterday, pt was not aware these prescriptions were filled.     Noted a 10 year risk of ASCVD risk of > 7.5 %.  Patient states that he was unaware that he has Diabetes and elevated cholesterol. Did not start the Lipitor that was previously prescribed due to concerns of myalgias       Problem list and histories reviewed & adjusted, as indicated.  Additional history: as documented    Current Outpatient Prescriptions   Medication Sig Dispense Refill     cyclobenzaprine (FLEXERIL) 10 MG tablet Take 0.5-1 tablets (5-10 mg) by mouth 3 times daily as needed for muscle spasms 30 tablet 0     gabapentin (NEURONTIN) 300 MG capsule Take 1 capsule (300 mg) by mouth 3 times daily Start 300 mg PO x day 1 then BID on day 2 then TID thereafter. 270 capsule 0     amLODIPine (NORVASC) 5 MG tablet Take 1 tablet (5 mg) by mouth daily 30 tablet 1     order for DME Equipment being ordered: CPAP equipment all supplies especially hoses and nose guards. 1 each 1     losartan (COZAAR) 100 MG tablet Take 1 tablet (100 mg) by mouth daily 90 tablet 1     HYDROcodone-acetaminophen (NORCO) 5-325 MG per tablet Take 1 tablet by  "mouth every 4 hours as needed for pain (Patient not taking: Reported on 4/5/2017) 20 tablet 0     Allergies   Allergen Reactions     Penicillins        Reviewed and updated as needed this visit by clinical staff  Tobacco  Allergies  Meds       Reviewed and updated as needed this visit by Provider         ROS:  Constitutional, HEENT, cardiovascular, pulmonary, gi and gu systems are negative, except as otherwise noted.    OBJECTIVE:                                                    /87  Pulse 117  Temp 96.7  F (35.9  C) (Tympanic)  Ht 5' 8\" (1.727 m)  Wt 283 lb 6.4 oz (128.5 kg)  SpO2 96%  BMI 43.09 kg/m2  Body mass index is 43.09 kg/(m^2).  GENERAL: healthy, alert and no distress      Diagnostic Test Results:  Results for orders placed or performed in visit on 03/28/17   MR Lumbar Spine w/o Contrast    Narrative    MR LUMBAR SPINE WITHOUT CONTRAST March 28, 2017 7:14 PM     HISTORY: Radiculopathy, lumbar region. Low back and right leg pain.    COMPARISON: 11/18/2013.    TECHNIQUE: Sagittal T1 and STIR. Sagittal T2 and axial dual-echo T2.     FINDINGS: Five lumbar vertebrae are assumed. There is some motion  artifact.     Findings by specific level:    There is minimal to mild multilevel facet degenerative change.    L1-L2: No disc herniation or stenosis.    L2-L3: Disc bulging without significant overall stenosis.    L3-L4: Mild disc bulging without significant overall stenosis.    L4-L5: There is a mild/moderate right parasagittal disc extrusion  including mild caudal extension of disc material. Mass effect on the  right L5 nerve root. There is bulging elsewhere. Moderate right  foraminal stenosis medially. Mild left foraminal stenosis. Mild  overall central stenosis. The disc extrusion is mildly smaller when  compared with the previous exam.    L5-S1: Disc bulging without central stenosis. The neural foramina  appear adequate.       Impression    IMPRESSION:  1. Multilevel degenerative disc and " facet disease.  2. L4-L5: Mild-moderate right disc extrusion with mass effect on the  L5 nerve root.  Mild overall central stenosis. Moderate right  foraminal stenosis medially.      SONJA CHADWICK MD     Component      Latest Ref Rng & Units 1/16/2017   LDL Cholesterol Direct      <100 mg/dL 147 (H)     Component      Latest Ref Rng & Units 1/16/2017   Hemoglobin A1C      4.3 - 6.0 % 6.6 (H)        ASSESSMENT/PLAN:                                                    Cooper was seen today for back pain.    Diagnoses and all orders for this visit:    Lumbar radiculopathy  Failed Chiropractor therapy. Declines PT  Spine specialist appointment upcoming on 4/18/2017  In the interim for pain management - Gabapentin, Flexeril and Hydrocodone.     Type 2 diabetes mellitus with hyperglycemia, without long-term current use of insulin (H), newly diagnosed  Patient was unaware of diagnosis    Hyperlipidemia LDL goal <100 with a 10 year ASCVD risk > 7.5 %  Currently not on statin therapy. Did not statin Lipitor due to concerns of myalgias.   Recommended a trial of other statins. Patient declined at this time. Would like to deal with back pain first.      Recommended to follow up with PCP to discuss diabetes/lipids management. Patient verbalized understanding.      Yumiko Wilson MD  [facility]

## 2017-04-18 ENCOUNTER — TELEPHONE (OUTPATIENT)
Dept: PALLIATIVE MEDICINE | Facility: CLINIC | Age: 62
End: 2017-04-18

## 2017-04-18 ENCOUNTER — OFFICE VISIT (OUTPATIENT)
Dept: NEUROSURGERY | Facility: CLINIC | Age: 62
End: 2017-04-18
Payer: COMMERCIAL

## 2017-04-18 VITALS
DIASTOLIC BLOOD PRESSURE: 92 MMHG | RESPIRATION RATE: 16 BRPM | HEART RATE: 84 BPM | OXYGEN SATURATION: 96 % | WEIGHT: 283.2 LBS | BODY MASS INDEX: 42.92 KG/M2 | HEIGHT: 68 IN | TEMPERATURE: 97.1 F | SYSTOLIC BLOOD PRESSURE: 142 MMHG

## 2017-04-18 DIAGNOSIS — M54.16 LUMBAR RADICULOPATHY: Primary | ICD-10-CM

## 2017-04-18 PROCEDURE — 99203 OFFICE O/P NEW LOW 30 MIN: CPT | Performed by: NURSE PRACTITIONER

## 2017-04-18 ASSESSMENT — PAIN SCALES - GENERAL: PAINLEVEL: SEVERE PAIN (7)

## 2017-04-18 NOTE — NURSING NOTE
"Cooper Vargas is a 61 year old male who presents for:  Chief Complaint   Patient presents with     Back Pain     Low back pain and right lateral leg pain. Onset: about 5 months. NKI. MRI 3/28/17. Pain just came on and has gotten worse. Has some N/T down right leg and stops at the ankle. He has been to the chiropractor, some relief but only for a day, and does stretches. Has been taking muslce relaxers and pain medication for the last 6 weeks. Tried to go off them but had pain.         Initial Vitals:  BP (!) 142/92  Pulse 84  Temp 97.1  F (36.2  C) (Oral)  Resp 16  Ht 5' 8\" (1.727 m)  Wt 283 lb 3.2 oz (128.5 kg)  SpO2 96%  BMI 43.06 kg/m2 Estimated body mass index is 43.06 kg/(m^2) as calculated from the following:    Height as of this encounter: 5' 8\" (1.727 m).    Weight as of this encounter: 283 lb 3.2 oz (128.5 kg).. Body surface area is 2.48 meters squared. BP completed using cuff size: large  Severe Pain (7)    Do you feel safe in your environment?  Yes  Do you need any refills today? No    Nursing Comments: Low back pain and right lateral leg pain. Onset: about 5 months. NKI. MRI 3/28/17. Pain just came on and has gotten worse. Has some N/T down right leg and stops at the ankle. He has been to the chiropractor, some relief but only for a day, and does stretches. Has been taking muslce relaxers and pain medication for the last 6 weeks. Tried to go off them but had pain.         Tamanna Solis    "

## 2017-04-18 NOTE — TELEPHONE ENCOUNTER
Pre-screening Questions for Radiology Injections:    Injection to be done at which interventional clinic site? [facility] [place]    Procedure ordered by GRIFFIN JARAMILLO    Procedure ordered? Lumbar Epidural Steroid Injection    What insurance would patient like us to bill for this procedure? [employer]      Worker's comp-Any injection DO NOT SCHEDULE and route to Angela Malone.      [employer] insurance - For SI joint injections, DO NOT SCHEDULE and route Angela Malone.      [employer]- MBB's must be scheduled at LEAST two weeks apart      [employer] - Any injection besides hip/shoulder/knee joint DO NOT SCHEDULE and route to Angela Malone. She will obtain PA and call pt back to schedule procedure or notify pt of denial.     HP [employer]-PA REQUIRED FOR NON-EVELYN OR Joint injections    Any chance of pregnancy? Not Applicable   If YES, do NOT schedule and route to RN pool    Is an  needed? No     Patient has a drive home? (mandatory) YES:     Is patient taking any blood thinners (plavix, coumadin, jantoven, warfarin, heparin, pradaxa or dabigatran )? No   If hold needed, do NOT schedule, route to RN pool     Is patient taking any aspirin products? No     If more than 325mg/day do NOT schedule; route to RN pool     For CERVICAL procedures, hold all aspirin products for 6 days.      Does the patient have a bleeding or clotting disorder? No     If yes, okay to schedule AND route to RN nurse pool    **For any patients with platelet count <100, must be forwarded to provider**    Is patient diabetic?  No  If YES, have them bring their glucometer.    Does patient have an active infection or treated for one within the past week? No     Is patient currently taking any antibiotics?  No     For patients on chronic, preventative, or prophylactic antibiotics, procedures may be scheduled.     For patients on antibiotics for active or recent infection:    Paul Barragan, Meghana Andrade,  Sumit-antibiotic course must have been completed for 4 days    Paul Headley-antibiotic course must have been completed for 7 days    Is patient currently taking any steroid medications? (i.e. Prednisone, Medrol)  No     For patients on steroid medications:    Raymond Ramon [place], Burton-steroid course must have been completed for 4 days    Paul Headley-steroid course must have been completed for 7 days    Reviewed with patient:  If you are started on any steroids or antibiotics between now and your appointment, you must contact us because it may affect our ability to perform your procedure.  Yes    Is patient actively being treated for cancer or immunocompromised, including the spleen having been removed? No    If YES, do NOT schedule and route to RN pool     **For Dr. Arroyo patients without spleens should have the chart sent to her**    Are you able to get on and off an exam table with minimal or no assistance? Yes  If NO, do NOT schedule and route to RN pool    Are you able to roll over and lay on your stomach with minimal or no assistance? Yes  If NO, do NOT schedule and route to RN pool     Any allergies to contrast dye, iodine, shellfish, or numbing and steroid medications? No  If YES, route to RN pool AND add allergy information to appointment notes    Allergies: Penicillins        Has the patient had a flu shot or any other vaccinations within 7 days before or after the procedure.  No       Does patient have an MRI/CT?  YES: MRI  (SI joint, hip injections, lumbar sympathetic blocks, and stellate ganglion blocks do not require an MRI)    Was the MRI done w/in the last 3 years?  Yes    Was MRI done at [place]? Yes      If not, where was it done? N/A       If MRI was not done at [place], [facility] or [facility] do NOT schedule and route to nursing.  If pt has an imaging disc, the injection may be scheduled but pt has to bring disc to appt. If they show up w/out disc the injection  cannot be done    Reminders (please tell patient if applicable):       Instructed pt to arrive 30 minutes early for IV start if this is for a cervical procedure, ALL sympathetic (stellate ganglion, hypogastric, or lumbar sympathetic block) and all sedation procedures (RFA, spinal cord stimulation trials).  Not Applicable    -IVs are not routinely placed for Andrade and Egyhazi cervical case       If NPO for sedation, informed patient that it is okay to take medications with sips of water (except if they are to hold blood thinners).  Not Applicable   *DO take blood pressure medication if it is prescribed*      If this is for a cervical EVELYN, informed patient that aspirin needs to be held for 6 days.   Not Applicable      For all patients not having spinal cord stimulator (SCS) trials or radiofrequency ablations (RFAs), informed patient:  IV sedation is not provided for this procedure.  If you feel that an oral anti-anxiety medication is needed, you can discuss this further with your referring provider or primary care provider.  The Pain Clinic provider will discuss specifics of what the procedure includes at your appointment.  Most procedures last 10-20 minutes.  We use numbing medications to help with any discomfort during the procedure.  Not Applicable      Do not schedule procedures requiring IV placement in the first appointment of the day or first appointment after lunch. REVIEWED       For patients 85 or older we recommend having an adult stay w/ them for the remainder of the day.   REVIEWED    Does the patient have any questions?  NO  Desi Butler  [facility]

## 2017-04-18 NOTE — MR AVS SNAPSHOT
After Visit Summary   4/18/2017    Cooper Vargas    MRN: [id number]           Patient Information     Date Of Birth          1955        Visit Information        Provider Department      4/18/2017 9:00 AM Cadence Glez NP [facility]        Today's Diagnoses     Lumbar radiculopathy    -  1      Care Instructions    Schedule lumbar epidural steroid injection (someone will contact you)  Please contact the clinic if pain persists at [phone].          Follow-ups after your visit        Additional Services     PAIN MANAGEMENT CENTER ([place]) REFERRAL       Your provider has referred you to the [facility].    Reason for Referral: Procedure or injection only - patient will be contacted within 24 hrs to schedule: Epidural Steroid (transforaminal approach): Lumbar, right L4-5    Please complete the following questions:    What is your diagnosis for the patient's pain? Lumbar radiculopathy      Do you have any specific questions for the pain specialist? No    Are there any red flags that may impact the assessment or management of the patient? None    **ANY DIAGNOSTIC TESTS THAT ARE NOT IN EPIC SHOULD BE SENT TO THE PAIN CENTER**    Please note the Pre-Op Pain Consult must be scheduled 2-3 weeks prior to the patient's surgery.  Patient's trying to schedule within 2 weeks of surgery may not be accommodated.     Pre-Op Pain Consults are only good for 30 days.    REGARDING OPIOID MEDICATIONS:  We will always address appropriateness of opioid pain medications, but we generally will not automatically take on a prescribing role. When we do take on prescribing of opioids for chronic pain, it is in collaboration with the referring physician for an intermediate period of time (months), with an expectation that the primary physician or provider will assume the prescribing role if medications are effective at stable doses with demonstrated compliance.  Therefore,  "please do not assume that your prescribing responsibilities end on the day of pain clinic consultation.  Is this agreeable to you? YES    For any questions, contact the [facility] at [phone].    Please be aware that coverage of these services is subject to the terms and limitations of your health insurance plan.  Call member services at your health plan with any benefit or coverage questions.      Please bring the following with you to your appointment:    (1) Any X-Rays, CTs or MRIs which have been performed.  Contact the facility where they were done to arrange for  prior to your scheduled appointment.    (2) List of current medications   (3) This referral request   (4) Any documents/labs given to you for this referral                  Who to contact     If you have questions or need follow up information about today's clinic visit or your schedule please contact [facility] directly at [phone].  Normal or non-critical lab and imaging results will be communicated to you by MyChart, letter or phone within 4 business days after the clinic has received the results. If you do not hear from us within 7 days, please contact the clinic through MyChart or phone. If you have a critical or abnormal lab result, we will notify you by phone as soon as possible.  Submit refill requests through [employer] or call your pharmacy and they will forward the refill request to us. Please allow 3 business days for your refill to be completed.          Additional Information About Your Visit        [employer] Information     [employer] lets you send messages to your doctor, view your test results, renew your prescriptions, schedule appointments and more. To sign up, go to [email or web address] . Click on \"Log in\" on the left side of the screen, which will take you to the Welcome page. Then click on \"Sign up Now\" on the right side of the page.     You will be asked to enter the access code " "listed below, as well as some personal information. Please follow the directions to create your username and password.     Your access code is: [email or web address]  Expires: 2017  9:35 AM     Your access code will  in 90 days. If you need help or a new code, please call your [facility] or [phone].        Care EveryWhere ID     This is your Care EveryWhere ID. This could be used by other organizations to access your [place] medical records  [id number]        Your Vitals Were     Pulse Temperature Respirations Height Pulse Oximetry BMI (Body Mass Index)    84 97.1  F (36.2  C) (Oral) 16 5' 8\" (1.727 m) 96% 43.06 kg/m2       Blood Pressure from Last 3 Encounters:   17 (!) 142/92   17 133/87   17 139/81    Weight from Last 3 Encounters:   17 283 lb 3.2 oz (128.5 kg)   17 283 lb 6.4 oz (128.5 kg)   17 282 lb 12.8 oz (128.3 kg)              We Performed the Following     PAIN MANAGEMENT CENTER ([place]) REFERRAL        Primary Care Provider Office Phone # Fax #    Max Orantes PA-C [phone]       [facility] [place] [address]  [place] [address]        Thank you!     Thank you for choosing [facility]  for your care. Our goal is always to provide you with excellent care. Hearing back from our patients is one way we can continue to improve our services. Please take a few minutes to complete the written survey that you may receive in the mail after your visit with us. Thank you!             Your Updated Medication List - Protect others around you: Learn how to safely use, store and throw away your medicines at www.disposemymeds.org.          This list is accurate as of: 17  9:35 AM.  Always use your most recent med list.                   Brand Name Dispense Instructions for use    amLODIPine 5 MG tablet    NORVASC    30 tablet    Take 1 tablet (5 mg) by mouth daily       cyclobenzaprine 10 MG tablet    FLEXERIL    30 tablet "    Take 0.5-1 tablets (5-10 mg) by mouth 3 times daily as needed for muscle spasms       gabapentin 300 MG capsule    NEURONTIN    270 capsule    Take 1 capsule (300 mg) by mouth 3 times daily Start 300 mg PO x day 1 then BID on day 2 then TID thereafter.       HYDROcodone-acetaminophen 5-325 MG per tablet    NORCO    20 tablet    Take 1 tablet by mouth every 4 hours as needed for pain       losartan 100 MG tablet    COZAAR    90 tablet    Take 1 tablet (100 mg) by mouth daily       order for DME     1 each    Equipment being ordered: CPAP equipment all supplies especially hoses and nose guards.

## 2017-04-18 NOTE — PROGRESS NOTES
Dr. Rickey Ram  [facility]  Neurosurgery Clinic Visit        CC: Low back pain    Primary care Provider: Max Orantes      Reason For Visit:   I was asked by Max Orantes PA-C to consult on the patient for lumbar radiculopathy.      HPI: Cooper Vargas is a 61 year old male with right-sided low back pain and RLE radiculopathy.  He states it has been present for the past 4-5 months without any precipitating cause, although he does recall similar pain in 2013 that resolved with chiropractic care.  He states the pain is a constant pain in the right lower back that radiates into the right buttock and along the right lateral leg stopping at the ankle.  He has numbness and tingling along the lateral leg.  He denies pain extending into the foot.  His pain increases generally as the day progresses with increased activities.  He has been taking pain medication at night to help him sleep. He was on oral steroids last month as well with no change in his pain. He has tried chiropractic care again, but stopped due to no relief.  He denies weakness to BLE or any falls.  He denies changes to bowel or bladder pattern.    Pain right now: 7    Past Medical History:   Diagnosis Date     BMI 39.0-39.9,adult      Fatigue      GERD (gastroesophageal reflux disease)      Helicobacter pylori 1999     Hyperlipidemia LDL goal < 130      Hypertension      Lyme disease 06/2003     LISETTE (obstructive sleep apnea)-Moderate (AHI 21) 2/3/2012     Seasonal allergies      Tobacco abuse        Past Medical History reviewed with patient during visit.    Past Surgical History:   Procedure Laterality Date     KNEE SURGERY       ORTHOPEDIC SURGERY  2009    Right knee     ORTHOPEDIC SURGERY  1970's    Right ankle     rib surgery  1982    Top right side rib removed     Past Surgical History reviewed with patient during visit.    Current Outpatient Prescriptions   Medication     cyclobenzaprine (FLEXERIL) 10 MG tablet      "HYDROcodone-acetaminophen (NORCO) 5-325 MG per tablet     gabapentin (NEURONTIN) 300 MG capsule     amLODIPine (NORVASC) 5 MG tablet     order for DME     losartan (COZAAR) 100 MG tablet     No current facility-administered medications for this visit.        Allergies   Allergen Reactions     Penicillins        Social History     Social History     Marital status:      Spouse name: Jo     Number of children: 2     Years of education: 13     Occupational History     [employer]     Semi-retired, goes to work several days a week.  [place] [employer] for [employer]     Social History Main Topics     Smoking status: Former Smoker     Packs/day: 1.00     Years: 42.00     Types: Cigarettes     Quit date: 9/29/2015     Smokeless tobacco: Never Used     Alcohol use 0.0 oz/week     0 Standard drinks or equivalent per week      Comment: social drinks on the weekends.12 drinks (imelda)weekly     Drug use: No     Sexual activity: Yes     Partners: Female     Other Topics Concern     None     Social History Narrative       Family History   Problem Relation Age of Onset     DIABETES Paternal Grandmother      Alzheimer Disease Paternal Grandmother      Neurologic Disorder Sister      migraines     Neurologic Disorder Son      Shaking of hands. Age 30 years           ROS: 10 point ROS neg other than the symptoms noted above in the HPI.    Vital Signs: BP (!) 142/92  Pulse 84  Temp 97.1  F (36.2  C) (Oral)  Resp 16  Ht 5' 8\" (1.727 m)  Wt 283 lb 3.2 oz (128.5 kg)  SpO2 96%  BMI 43.06 kg/m2    Examination:  Constitutional:  Alert, obese, NAD.  HEENT: Normocephalic, atraumatic.   Pulm:  Without shortness of breath   CV:  No pitting edema of BLE.    Neurological:  Awake  Alert  Oriented x 3  Speech clear  Cranial nerves II - XII intact    Motor exam   Shoulder Abduction:  Right:  5/5   Left:  5/5  Biceps:                      Right:  5/5   Left:  5/5  Triceps:                     Right:  5/5   Left:  " 5/5  Wrist Extensors:       Right:  5/5   Left:  5/5  Wrist Flexors:           Right:  5/5   Left:  5/5  Intrinsics:                   Right:  5/5   Left:  5/5  Hip Flexor:                Right: 5/5  Left:  5/5  Hip Adductor:             Right:  5/5  Left:  5/5  Hip Abductor:             Right:  5/5  Left:  5/5  Gastroc Soleus:        Right:  5/5  Left:  5/5  Tib/Ant:                      Right:  5/5  Left:  5/5  EHL:                          Right:  5/5  Left:  5/5   Sensation normal to bilateral upper and lower extremities  Clonus negative  DTRs 1+ symmetric  Gait: Able to stand from a seated position. Normal non-antalgic, non-myelopathic gait.  Able to heel/toe walk without loss of balance  Cervical examination reveals good range of motion.  No tenderness to palpation of the cervical spine or paraspinous muscles bilaterally.    Lumbar examination reveals tenderness of the spine midline and paraspinous muscles to the right.  Hip height is symmetrical. Negative SI joint, sciatic notch or greater trochanteric tenderness to palpation bilaterally.  Straight leg raise is negative bilaterally.      Imaging: Lumbar MRI 3/28/17:  1. Multilevel degenerative disc and facet disease.  2. L4-L5: Mild-moderate right disc extrusion with mass effect on the L5 nerve root. Mild overall central stenosis. Moderate right foraminal stenosis medially.       Assessment/Plan:   Lumbar Radiculopathy  Lumbar DDD    Cooper Vargas is a 61 year old male with right-sided low back pain and RLE radiculopathy.  He states it has been present for the past 4-5 months without any precipitating cause, although he does recall similar pain in 2013 that resolved with chiropractic care.  He states the pain is a constant pain in the right lower back that radiates into the right buttock and along the right lateral leg stopping at the ankle.  He has numbness and tingling along the lateral leg.  He denies pain extending into the foot.  He has had minimal  relief with chiropractic care recently.  We reviewed MRI findings, including L4-5 disc extrusion that was present back in 2013.  The patient is interested in trialing injections before considering surgery.  Therefore, a right TLESI at L4-5 was ordered.  The patient will contact us following the injection to see if repeat injections needed or f/u with Dr. Ram.    Patient Instructions   Schedule lumbar epidural steroid injection (someone will contact you)  Please contact the clinic if pain persists at [phone].        Cadence Glez [facility]  [facility]  [address] [address]    Tel [phone]  Pager [phone]

## 2017-04-18 NOTE — PATIENT INSTRUCTIONS
Schedule lumbar epidural steroid injection (someone will contact you)  Please contact the clinic if pain persists at [phone].

## 2017-05-03 ENCOUNTER — RADIOLOGY INJECTION OFFICE VISIT (OUTPATIENT)
Dept: PALLIATIVE MEDICINE | Facility: CLINIC | Age: 62
End: 2017-05-03
Payer: COMMERCIAL

## 2017-05-03 ENCOUNTER — RADIANT APPOINTMENT (OUTPATIENT)
Dept: RADIOLOGY | Facility: CLINIC | Age: 62
End: 2017-05-03
Attending: PSYCHIATRY & NEUROLOGY
Payer: COMMERCIAL

## 2017-05-03 VITALS — OXYGEN SATURATION: 96 % | SYSTOLIC BLOOD PRESSURE: 123 MMHG | DIASTOLIC BLOOD PRESSURE: 93 MMHG | HEART RATE: 81 BPM

## 2017-05-03 DIAGNOSIS — M54.16 LUMBAR RADICULOPATHY: ICD-10-CM

## 2017-05-03 DIAGNOSIS — M54.16 LUMBAR RADICULOPATHY: Primary | ICD-10-CM

## 2017-05-03 PROCEDURE — 64483 NJX AA&/STRD TFRM EPI L/S 1: CPT | Mod: RT | Performed by: PSYCHIATRY & NEUROLOGY

## 2017-05-03 ASSESSMENT — PAIN SCALES - GENERAL
PAINLEVEL: NO PAIN (0)
PAINLEVEL: MODERATE PAIN (5)

## 2017-05-03 NOTE — NURSING NOTE
"Chief Complaint   Patient presents with     Pain       Initial There were no vitals taken for this visit. Estimated body mass index is 43.06 kg/(m^2) as calculated from the following:    Height as of 4/18/17: 1.727 m (5' 8\").    Weight as of 4/18/17: 128.5 kg (283 lb 3.2 oz).  Medication Reconciliation: complete     Injection intake:    If this procedure is requiring IV sedation has patient been NPO for 6  Hours? NA    Is patient on coumadin, plavix or other prescribed blood thinner?   No    If patient is on coumadin was it held for 5 days?   NA    If patient is on plavix was it held for 7 days?    NA     Does patient take aspirin?  No    If this is for a cervical procedure and patient is on aspirin has it been held for 6 days?   NA    Any allergies to contrast dye, iodine, steroid and/or numbing medications?  NO    Is patient currently taking antibiotics or have an active infection?  NO    Does patient have a ? Yes       Is patient pregnant or breastfeeding?  Not Applicable    Are the vital signs normal?  Yes    Barbara Fisher CMA (AAMA)        "

## 2017-05-03 NOTE — MR AVS SNAPSHOT
After Visit Summary   5/3/2017    Cooper Vargas    MRN: [id number]           Patient Information     Date Of Birth          1955        Visit Information        Provider Department      5/3/2017 8:45 AM Nyla Kowalski MD [facility]        Care Instructions    [facility]   Procedure Discharge Instructions    Today you saw:  Dr. Nyla Kowalski      You had an:  Lumbar Epidural steroid injection     Medications used:  Lidocaine   Bupivacaine   Dexamethasone Omnipaque              Be cautious when walking. Numbness and/or weakness in the lower extremities may occur up to 6-8 hours after the procedure due to effect of the local anesthetic    Do not drive for 6 hours. The effect of the local anesthetic could slow your reflexes.     You may resume your regular activities after 24 hours    Avoid strenuous activity for the first 24 hours    You may shower, however avoid swimming, tub baths or hot tubs for 24 hours following your procedure    You may have a mild to moderate increase in pain for several days following the injection.    It may take up to 14 days for the steroid medication to start working although you may feel the effect as early as a few days after the procedure.       You may use ice packs for 10-15 minutes, 3 to 4 times a day at the injection site for comfort    Do not use heat to painful areas for 6 to 8 hours. This will give the local anesthetic time to wear off and prevent you from accidentally burning your skin.     You may use anti-inflammatory medications (such as Ibuprofen or Aleve or Advil) or Tylenol for pain control if necessary    If you have diabetes, check your blood sugar more frequently than usual as your blood sugar may be higher than normal for 10-14 days following a steroid injection. Contact your doctor who manages your diabetes if your blood sugar is higher than usual    If you experience any of the following, call the pain  "center nursing line during work hours at [phone] or the after hours provider line at [phone]:  -Fever over 100 degree F  -Swelling, bleeding, redness, drainage, warmth at the injection site  -Progressive weakness or numbness in your legs   -Loss of bowel or bladder function  -Unusual new onset of pain that is not improving      Phone #s:  Appointment line: [phone];  Nurse line: [phone]            Follow-ups after your visit        Who to contact     If you have questions or need follow up information about today's clinic visit or your schedule please contact [facility] directly at [phone].  Normal or non-critical lab and imaging results will be communicated to you by Epp[employer]hart, letter or phone within 4 business days after the clinic has received the results. If you do not hear from us within 7 days, please contact the clinic through [employer] or phone. If you have a critical or abnormal lab result, we will notify you by phone as soon as possible.  Submit refill requests through Venga or call your pharmacy and they will forward the refill request to us. Please allow 3 business days for your refill to be completed.          Additional Information About Your Visit        [employer] Information     Venga lets you send messages to your doctor, view your test results, renew your prescriptions, schedule appointments and more. To sign up, go to [email or web address] . Click on \"Log in\" on the left side of the screen, which will take you to the Welcome page. Then click on \"Sign up Now\" on the right side of the page.     You will be asked to enter the access code listed below, as well as some personal information. Please follow the directions to create your username and password.     Your access code is: [email or web address]  Expires: 2017  9:35 AM     Your access code will  in 90 days. If you need help or a new code, please call your [facility] or [phone].        Care " EveryWhere ID     This is your Care EveryWhere ID. This could be used by other organizations to access your [facility] medical records  [id number]        Your Vitals Were     Pulse                   80            Blood Pressure from Last 3 Encounters:   05/03/17 129/88   04/18/17 (!) 142/92   04/05/17 133/87    Weight from Last 3 Encounters:   04/18/17 128.5 kg (283 lb 3.2 oz)   04/05/17 128.5 kg (283 lb 6.4 oz)   03/27/17 128.3 kg (282 lb 12.8 oz)              Today, you had the following     No orders found for display       Primary Care Provider Office Phone # Fax #    Max Orantes PA-C [phone]       [facility] [address] [place] [address]        Thank you!     Thank you for choosing [facility]  for your care. Our goal is always to provide you with excellent care. Hearing back from our patients is one way we can continue to improve our services. Please take a few minutes to complete the written survey that you may receive in the mail after your visit with us. Thank you!             Your Updated Medication List - Protect others around you: Learn how to safely use, store and throw away your medicines at www.disposemymeds.org.          This list is accurate as of: 5/3/17  9:23 AM.  Always use your most recent med list.                   Brand Name Dispense Instructions for use    amLODIPine 5 MG tablet    NORVASC    30 tablet    Take 1 tablet (5 mg) by mouth daily       cyclobenzaprine 10 MG tablet    FLEXERIL    30 tablet    Take 0.5-1 tablets (5-10 mg) by mouth 3 times daily as needed for muscle spasms       gabapentin 300 MG capsule    NEURONTIN    270 capsule    Take 1 capsule (300 mg) by mouth 3 times daily Start 300 mg PO x day 1 then BID on day 2 then TID thereafter.       HYDROcodone-acetaminophen 5-325 MG per tablet    NORCO    20 tablet    Take 1 tablet by mouth every 4 hours as needed for pain       losartan 100 MG tablet    COZAAR    90 tablet    Take 1  tablet (100 mg) by mouth daily       order for DME     1 each    Equipment being ordered: CPAP equipment all supplies especially hoses and nose guards.

## 2017-05-03 NOTE — PATIENT INSTRUCTIONS
[facility]   Procedure Discharge Instructions    Today you saw:  Dr. Nyla Kowalski      You had an:  Lumbar Epidural steroid injection     Medications used:  Lidocaine   Bupivacaine   Dexamethasone Omnipaque              Be cautious when walking. Numbness and/or weakness in the lower extremities may occur up to 6-8 hours after the procedure due to effect of the local anesthetic    Do not drive for 6 hours. The effect of the local anesthetic could slow your reflexes.     You may resume your regular activities after 24 hours    Avoid strenuous activity for the first 24 hours    You may shower, however avoid swimming, tub baths or hot tubs for 24 hours following your procedure    You may have a mild to moderate increase in pain for several days following the injection.    It may take up to 14 days for the steroid medication to start working although you may feel the effect as early as a few days after the procedure.       You may use ice packs for 10-15 minutes, 3 to 4 times a day at the injection site for comfort    Do not use heat to painful areas for 6 to 8 hours. This will give the local anesthetic time to wear off and prevent you from accidentally burning your skin.     You may use anti-inflammatory medications (such as Ibuprofen or Aleve or Advil) or Tylenol for pain control if necessary    If you have diabetes, check your blood sugar more frequently than usual as your blood sugar may be higher than normal for 10-14 days following a steroid injection. Contact your doctor who manages your diabetes if your blood sugar is higher than usual    If you experience any of the following, call the pain center nursing line during work hours at [phone] or the after hours provider line at [phone]:  -Fever over 100 degree F  -Swelling, bleeding, redness, drainage, warmth at the injection site  -Progressive weakness or numbness in your legs   -Loss of bowel or bladder function  -Unusual new  onset of pain that is not improving      Phone #s:  Appointment line: [phone];  Nurse line: [phone]

## 2017-05-03 NOTE — NURSING NOTE
Discharge Information    IV Discontiued Time:  NA    Amount of Fluid Infused:  NA    Discharge Criteria = When patient returns to baseline or as per MD order    Consciousness:  Pt is fully awake    Circulation:  BP +/- 20% of pre-procedure level    Respiration:  Patient is able to breathe deeply    O2 Sat:  Patient is able to maintain O2 Sat >92% on room air    Activity:  Moves 4 extremities on command    Ambulation:  Patient is able to stand and walk or stand and pivot into wheelchair    Dressing:  Clean/dry or No Dressing    Notes:   Discharge instructions and AVS given to patient    Patient meets criteria for discharge?  YES    Admitted to PCU?  No    Responsible adult present to accompany patient home?  Yes    Signature/Title:    Emerita Cobos RN Care Coordinator  [facility]

## 2017-05-03 NOTE — PROGRESS NOTES
Pre procedure Diagnosis: lumbar radiculopathy   Post procedure Diagnosis: Same  Procedure performed: right L4-5 transforaminal epidural steroid injection   Anesthesia: none  Complications: none  Operators: Silvana Kowalski MD     Indications:   Cooper Vargas is a 61 year old male was sent by Cadence Glez NP for epidural steroid injection.  They have a history of low back pain, which goes across the back.  He has had symptoms in the right leg to the foot, but he hasn't had any for 2 weeks.  Exam shows normal LE strength, no SI joint tenderness, no pain with extension/rotation or extension and they have tried conservative treatment including medications, PT.    MRI was done on 3/28/17 which showed   1. Multilevel degenerative disc and facet disease.  2. L4-L5: Mild-moderate right disc extrusion with mass effect on the  L5 nerve root. Mild overall central stenosis. Moderate right  foraminal stenosis medially.       Options/alternatives, benefits and risks were discussed with the patient including bleeding, infection, tissue trauma, numbness, weakness, paralysis, spinal cord injury, radiation exposure, headache and reaction to medications. Questions were answered to his satisfaction and he agrees to proceed. Voluntary informed consent was obtained and signed.     Vitals were reviewed: Yes  Allergies were reviewed:  Yes   Medications were reviewed:  Yes   Pre-procedure pain score: 5/10    Procedure:  After getting informed consent, patient was brought into the procedure suite and was placed in a prone position on the procedure table.   A Pause for the Cause was performed.  Patient was prepped and draped in sterile fashion.     After identifying the right L-5 neuroforamen, the C-arm was rotated to a right lateral oblique angle.  A total of 4ml of Lidocaine 1% was used to anesthetize the skin and the needle track at a skin entry site coaxial with the fluoroscopy beam, and overriding the superior aspect of the  neuroforamen.  A 22 gauge 5 inch spinal needle (consider 7 inch) was advanced under intermittent fluoroscopy until it entered the foramen superiorly.    The position was then inspected from anteroposterior and lateral views, and the needle adjusted appropriately.  A total of 2ml of Omnipaque-300 was injected, confirming appropriate position, with spread into the nerve root sheath and the epidural space, with no intravascular uptake. 8ml was wasted    1.5ml of 0.5% bupivacaine with 10mg of dexamethasone was injected.  The needle was flushed with lidocaine and removed.    During the procedure, there was not a paresthesia.   Hemostasis was achieved, the area was cleaned, and bandaids were placed when appropriate.  The patient tolerated the procedure well, and was taken to the recovery room.    Images were saved to PACS.    Post-procedure pain score: 0/10  Follow-up includes:   -f/u phone call in one week  -f/u with referring provider    Silvana Kowalski MD  [facility]

## 2017-05-10 ENCOUNTER — TELEPHONE (OUTPATIENT)
Dept: RADIOLOGY | Facility: CLINIC | Age: 62
End: 2017-05-10

## 2017-05-10 NOTE — TELEPHONE ENCOUNTER
Patient had a right L4-5 transforaminal epidural steroid injection on 5/3/17.  Called patient for an update.      Pt reported the following details: He has no back pain but he still has leg pain. His leg pain is different in that they get sore and tired faster but he feels he may be over doing it a little. Told patient that the information will be forwarded to her provider.  Also explained that, if a steroid medication was used, it could take up to 14 days to feel the full effect and if pt has any further questions or concerns pt should call the nurse line at [phone].

## 2017-05-12 DIAGNOSIS — I10 BENIGN ESSENTIAL HYPERTENSION: ICD-10-CM

## 2017-05-12 DIAGNOSIS — I10 ESSENTIAL HYPERTENSION WITH GOAL BLOOD PRESSURE LESS THAN 140/90: ICD-10-CM

## 2017-05-12 NOTE — LETTER
[facility]  [address]  [place] [address]  [phone]      May 15, 2017      Cooper Vargas  [address] [place] [address]        Cooper     Your medication has been approved for norvasc and losartan.    However, you are due for a follow up appointment for blood pressure and back pain for further refills. Please schedule this visit at your earliest convenience.    [facility]

## 2017-05-12 NOTE — TELEPHONE ENCOUNTER
AMLODIPINE      Last Written Prescription Date: 4-7-17  Last Fill Quantity: 30, # refills: 1    Last Office Visit with [facility], [facility] or [facility] prescribing provider:  4-18-17   Future Office Visit:        BP Readings from Last 3 Encounters:   05/03/17 (!) 123/93   04/18/17 (!) 142/92   04/05/17 133/87     LOSARTAN      Last Written Prescription Date: 1-11-17  Last Fill Quantity: 90, # refills: 1  Last Office Visit with [facility], [facility] or [facility] prescribing provider: 4-18-17       Potassium   Date Value Ref Range Status   01/16/2017 4.1 3.4 - 5.3 mmol/L Final     Creatinine   Date Value Ref Range Status   01/16/2017 0.93 0.66 - 1.25 mg/dL Final     BP Readings from Last 3 Encounters:   05/03/17 (!) 123/93   04/18/17 (!) 142/92   04/05/17 133/87

## 2017-05-13 RX ORDER — AMLODIPINE BESYLATE 5 MG/1
TABLET ORAL
Qty: 30 TABLET | Refills: 0 | Status: SHIPPED | OUTPATIENT
Start: 2017-05-13 | End: 2017-10-31

## 2017-05-13 RX ORDER — LOSARTAN POTASSIUM 100 MG/1
TABLET ORAL
Qty: 90 TABLET | Refills: 1 | Status: SHIPPED | OUTPATIENT
Start: 2017-05-13 | End: 2017-10-31

## 2017-05-13 NOTE — TELEPHONE ENCOUNTER
dotty is due for a recheck. Have him make an appt to see me for a recheck of his blood pressure and low back pain

## 2017-05-23 PROBLEM — M54.41 MIDLINE LOW BACK PAIN WITH RIGHT-SIDED SCIATICA: Status: RESOLVED | Noted: 2017-03-14 | Resolved: 2017-05-23

## 2017-05-23 NOTE — PROGRESS NOTES
Patient did not return to PT following initial evaluation on 3/14/17. Please let evaluation information serve as discharge information.

## 2017-10-25 ENCOUNTER — DOCUMENTATION ONLY (OUTPATIENT)
Dept: LAB | Facility: CLINIC | Age: 62
End: 2017-10-25

## 2017-10-25 DIAGNOSIS — R80.9 TYPE 2 DIABETES MELLITUS WITH MICROALBUMINURIA, WITHOUT LONG-TERM CURRENT USE OF INSULIN (H): ICD-10-CM

## 2017-10-25 DIAGNOSIS — E78.5 HYPERLIPIDEMIA LDL GOAL <100: Primary | ICD-10-CM

## 2017-10-25 DIAGNOSIS — E11.29 TYPE 2 DIABETES MELLITUS WITH MICROALBUMINURIA, WITHOUT LONG-TERM CURRENT USE OF INSULIN (H): ICD-10-CM

## 2017-10-25 NOTE — PROGRESS NOTES
Orders signed. BMP added. Patient's cholesterol was high at last draw but patient stated to RN that he would not take medication for that as he got side effects from it. If patient wants cholesterol rechecked (not due until jan 2018) ok to order.

## 2017-10-26 DIAGNOSIS — R80.9 TYPE 2 DIABETES MELLITUS WITH MICROALBUMINURIA, WITHOUT LONG-TERM CURRENT USE OF INSULIN (H): ICD-10-CM

## 2017-10-26 DIAGNOSIS — E11.29 TYPE 2 DIABETES MELLITUS WITH MICROALBUMINURIA, WITHOUT LONG-TERM CURRENT USE OF INSULIN (H): ICD-10-CM

## 2017-10-26 DIAGNOSIS — E78.5 HYPERLIPIDEMIA LDL GOAL <100: ICD-10-CM

## 2017-10-26 LAB
ANION GAP SERPL CALCULATED.3IONS-SCNC: 7 MMOL/L (ref 3–14)
BUN SERPL-MCNC: 14 MG/DL (ref 7–30)
CALCIUM SERPL-MCNC: 9.2 MG/DL (ref 8.5–10.1)
CHLORIDE SERPL-SCNC: 102 MMOL/L (ref 94–109)
CO2 SERPL-SCNC: 28 MMOL/L (ref 20–32)
CREAT SERPL-MCNC: 0.93 MG/DL (ref 0.66–1.25)
GFR SERPL CREATININE-BSD FRML MDRD: 83 ML/MIN/1.7M2
GLUCOSE SERPL-MCNC: 108 MG/DL (ref 70–99)
HBA1C MFR BLD: 6.8 % (ref 4.3–6)
POTASSIUM SERPL-SCNC: 4.3 MMOL/L (ref 3.4–5.3)
SODIUM SERPL-SCNC: 137 MMOL/L (ref 133–144)

## 2017-10-26 PROCEDURE — 36415 COLL VENOUS BLD VENIPUNCTURE: CPT | Performed by: PHYSICIAN ASSISTANT

## 2017-10-26 PROCEDURE — 83036 HEMOGLOBIN GLYCOSYLATED A1C: CPT | Performed by: PHYSICIAN ASSISTANT

## 2017-10-26 PROCEDURE — 80048 BASIC METABOLIC PNL TOTAL CA: CPT | Performed by: PHYSICIAN ASSISTANT

## 2017-10-31 ENCOUNTER — OFFICE VISIT (OUTPATIENT)
Dept: FAMILY MEDICINE | Facility: CLINIC | Age: 62
End: 2017-10-31
Payer: COMMERCIAL

## 2017-10-31 VITALS
TEMPERATURE: 98.2 F | WEIGHT: 283.38 LBS | SYSTOLIC BLOOD PRESSURE: 148 MMHG | BODY MASS INDEX: 42.95 KG/M2 | HEIGHT: 68 IN | DIASTOLIC BLOOD PRESSURE: 92 MMHG | HEART RATE: 68 BPM | OXYGEN SATURATION: 96 %

## 2017-10-31 DIAGNOSIS — R80.9 TYPE 2 DIABETES MELLITUS WITH MICROALBUMINURIA, WITHOUT LONG-TERM CURRENT USE OF INSULIN (H): ICD-10-CM

## 2017-10-31 DIAGNOSIS — E11.29 TYPE 2 DIABETES MELLITUS WITH MICROALBUMINURIA, WITHOUT LONG-TERM CURRENT USE OF INSULIN (H): ICD-10-CM

## 2017-10-31 DIAGNOSIS — Z23 NEED FOR PROPHYLACTIC VACCINATION AND INOCULATION AGAINST INFLUENZA: ICD-10-CM

## 2017-10-31 DIAGNOSIS — Z00.01 ENCOUNTER FOR ROUTINE ADULT HEALTH EXAMINATION WITH ABNORMAL FINDINGS: Primary | ICD-10-CM

## 2017-10-31 DIAGNOSIS — I10 ESSENTIAL HYPERTENSION WITH GOAL BLOOD PRESSURE LESS THAN 140/90: ICD-10-CM

## 2017-10-31 PROCEDURE — 99396 PREV VISIT EST AGE 40-64: CPT | Mod: 25 | Performed by: PHYSICIAN ASSISTANT

## 2017-10-31 PROCEDURE — 90471 IMMUNIZATION ADMIN: CPT | Performed by: PHYSICIAN ASSISTANT

## 2017-10-31 PROCEDURE — 90686 IIV4 VACC NO PRSV 0.5 ML IM: CPT | Performed by: PHYSICIAN ASSISTANT

## 2017-10-31 RX ORDER — AMLODIPINE AND VALSARTAN 10; 160 MG/1; MG/1
1 TABLET ORAL DAILY
Qty: 90 TABLET | Refills: 1 | Status: SHIPPED | OUTPATIENT
Start: 2017-10-31 | End: 2017-11-30

## 2017-10-31 NOTE — MR AVS SNAPSHOT
After Visit Summary   10/31/2017    Cooper Vargas    MRN: [id number]           Patient Information     Date Of Birth          1955        Visit Information        Provider Department      10/31/2017 8:00 AM Max Orantes PA-C [facility]        Today's Diagnoses     Encounter for routine adult health examination with abnormal findings    -  1    Need for prophylactic vaccination and inoculation against influenza        Type 2 diabetes mellitus with microalbuminuria, without long-term current use of insulin (H)        Essential hypertension with goal blood pressure less than 140/90          Care Instructions      Preventive Health Recommendations  Male Ages 50 - 64    Yearly exam:             See your health care provider every year in order to  o   Review health changes.   o   Discuss preventive care.    o   Review your medicines if your doctor has prescribed any.     Have a cholesterol test every 5 years, or more frequently if you are at risk for high cholesterol/heart disease.     Have a diabetes test (fasting glucose) every three years. If you are at risk for diabetes, you should have this test more often.     Have a colonoscopy at age 50, or have a yearly FIT test (stool test). These exams will check for colon cancer.      Talk with your health care provider about whether or not a prostate cancer screening test (PSA) is right for you.    You should be tested each year for STDs (sexually transmitted diseases), if you re at risk.     Shots: Get a flu shot each year. Get a tetanus shot every 10 years.     Nutrition:    Eat at least 5 servings of fruits and vegetables daily.     Eat whole-grain bread, whole-wheat pasta and brown rice instead of white grains and rice.     Talk to your provider about Calcium and Vitamin D.     Lifestyle    Exercise for at least 150 minutes a week (30 minutes a day, 5 days a week). This will help you control your weight and prevent disease.  "    Limit alcohol to one drink per day.     No smoking.     Wear sunscreen to prevent skin cancer.     See your dentist every six months for an exam and cleaning.     See your eye doctor every 1 to 2 years.            Follow-ups after your visit        Who to contact     Normal or non-critical lab and imaging results will be communicated to you by VoiceBunnyhart, letter or phone within 4 business days after the clinic has received the results. If you do not hear from us within 7 days, please contact the clinic through VoiceBunnyhart or phone. If you have a critical or abnormal lab result, we will notify you by phone as soon as possible.  Submit refill requests through PASSNFLY or call your pharmacy and they will forward the refill request to us. Please allow 3 business days for your refill to be completed.          If you need to speak with a  for additional information , please call: [phone]             Additional Information About Your Visit        PASSNFLY Information     PASSNFLY lets you send messages to your doctor, view your test results, renew your prescriptions, schedule appointments and more. To sign up, go to [email or web address] . Click on \"Log in\" on the left side of the screen, which will take you to the Welcome page. Then click on \"Sign up Now\" on the right side of the page.     You will be asked to enter the access code listed below, as well as some personal information. Please follow the directions to create your username and password.     Your access code is: [email or web address]  Expires: 2018  8:43 AM     Your access code will  in 90 days. If you need help or a new code, please call your [facility] clinic or [phone].        Care EveryWhere ID     This is your Care EveryWhere ID. This could be used by other organizations to access your [facility] medical records  [id number]        Your Vitals Were     Pulse Temperature Height Pulse Oximetry BMI (Body Mass Index)       68 98.2  F " "(36.8  C) (Oral) 5' 8\" (1.727 m) 96% 43.09 kg/m2        Blood Pressure from Last 3 Encounters:   10/31/17 (!) 148/92   05/03/17 (!) 123/93   04/18/17 (!) 142/92    Weight from Last 3 Encounters:   10/31/17 283 lb 6 oz (128.5 kg)   04/18/17 283 lb 3.2 oz (128.5 kg)   04/05/17 283 lb 6.4 oz (128.5 kg)              We Performed the Following     FLU VAC, SPLIT VIRUS IM > 3 YO (QUADRIVALENT) [37134]     Vaccine Administration, Initial [73830]          Today's Medication Changes          These changes are accurate as of: 10/31/17  8:43 AM.  If you have any questions, ask your nurse or doctor.               Start taking these medicines.        Dose/Directions    Amlodipine Besylate-Valsartan  MG Tabs   Used for:  Essential hypertension with goal blood pressure less than 140/90   Started by:  Max Orantes PA-C        Dose:  1 tablet   Take 1 tablet by mouth daily   Quantity:  90 tablet   Refills:  1         Stop taking these medicines if you haven't already. Please contact your care team if you have questions.     gabapentin 300 MG capsule   Commonly known as:  NEURONTIN   Stopped by:  Max Orantes PA-C           losartan 100 MG tablet   Commonly known as:  COZAAR   Stopped by:  Max Orantes PA-C                Where to get your medicines      These medications were sent to [facility] [address]  [address] [address] [address]     Phone:  [phone]     Amlodipine Besylate-Valsartan  MG Tabs                Primary Care Provider Office Phone # Fax #    Max Orantes PA-C [phone] [address]  [place] [address]        Equal Access to Services     [facility] GISELL AH: Hadii pablo gillespie hadasho Soomaali, waaxda luqadaha, qaybta kaalmada adeegyada, leigha rodriguez. [facility]ly [facility] [phone].    ATENCIÓN: Si habla español, tiene a leach disposición servicios gratuitos de asistencia lingüística. Llame al " [phone].    We comply with applicable federal civil rights laws and [place] laws. We do not discriminate on the basis of race, color, national origin, age, disability, sex, sexual orientation, or gender identity.            Thank you!     Thank you for choosing [facility]  for your care. Our goal is always to provide you with excellent care. Hearing back from our patients is one way we can continue to improve our services. Please take a few minutes to complete the written survey that you may receive in the mail after your visit with us. Thank you!             Your Updated Medication List - Protect others around you: Learn how to safely use, store and throw away your medicines at www.disposemymeds.org.          This list is accurate as of: 10/31/17  8:43 AM.  Always use your most recent med list.                   Brand Name Dispense Instructions for use Diagnosis    Amlodipine Besylate-Valsartan  MG Tabs     90 tablet    Take 1 tablet by mouth daily    Essential hypertension with goal blood pressure less than 140/90       order for DME     1 each    Equipment being ordered: CPAP equipment all supplies especially hoses and nose guards.    LISETTE (obstructive sleep apnea)

## 2017-10-31 NOTE — PROGRESS NOTES
SUBJECTIVE:   CC: Cooper Vargas is an 62 year old male who presents for preventative health visit.     Healthy Habits:    Do you get at least three servings of calcium containing foods daily (dairy, green leafy vegetables, etc.)? yes    Amount of exercise or daily activities, outside of work: none    Problems taking medications regularly No    Medication side effects: No    Have you had an eye exam in the past two years? no    Do you see a dentist twice per year? no    Do you have sleep apnea, excessive snoring or daytime drowsiness?yes            Today's PHQ-2 Score:   PHQ-2 ( 1999 Pfizer) 4/18/2017 3/13/2017   Q1: Little interest or pleasure in doing things 0 0   Q2: Feeling down, depressed or hopeless 0 0   PHQ-2 Score 0 0         Abuse: Current or Past(Physical, Sexual or Emotional)- No  Do you feel safe in your environment - Yes    Social History   Substance Use Topics     Smoking status: Former Smoker     Packs/day: 1.00     Years: 42.00     Types: Cigarettes     Quit date: 9/29/2015     Smokeless tobacco: Never Used     Alcohol use 0.0 oz/week     0 Standard drinks or equivalent per week      Comment: social drinks on the weekends.12 drinks (imelda)weekly     The patient does not drink >3 drinks per day nor >7 drinks per week.    Last PSA:   PSA   Date Value Ref Range Status   10/22/2013 0.72 0 - 4 ug/L Final       Reviewed orders with patient. Reviewed health maintenance and updated orders accordingly - Yes  Labs reviewed in EPIC  BP Readings from Last 3 Encounters:   10/31/17 (!) 148/92   05/03/17 (!) 123/93   04/18/17 (!) 142/92    Wt Readings from Last 3 Encounters:   10/31/17 283 lb 6 oz (128.5 kg)   04/18/17 283 lb 3.2 oz (128.5 kg)   04/05/17 283 lb 6.4 oz (128.5 kg)                  Patient Active Problem List   Diagnosis     GERD (gastroesophageal reflux disease)     Plantar fasciitis     Fatigue     Helicobacter pylori infection     Lyme disease     Smoking     Obesity     Tremors, hands      Hyperlipidemia LDL goal <100     LISETTE (obstructive sleep apnea)-Moderate (AHI 21)     Advanced directives, counseling/discussion     Acute gouty arthritis     Bronchitis, mucopurulent recurrent (H)     Mixed simple and mucopurulent chronic bronchitis (H)     Essential hypertension with goal blood pressure less than 140/90     Type 2 diabetes mellitus with microalbuminuria, without long-term current use of insulin (H)     Past Surgical History:   Procedure Laterality Date     KNEE SURGERY       ORTHOPEDIC SURGERY  2009    Right knee     ORTHOPEDIC SURGERY  1970's    Right ankle     rib surgery  1982    Top right side rib removed       Social History   Substance Use Topics     Smoking status: Former Smoker     Packs/day: 1.00     Years: 42.00     Types: Cigarettes     Quit date: 9/29/2015     Smokeless tobacco: Never Used     Alcohol use 0.0 oz/week     0 Standard drinks or equivalent per week      Comment: social drinks on the weekends.12 drinks (imelda)weekly     Family History   Problem Relation Age of Onset     DIABETES Paternal Grandmother      Alzheimer Disease Paternal Grandmother      Neurologic Disorder Sister      migraines     Neurologic Disorder Son      Shaking of hands. Age 30 years         Current Outpatient Prescriptions   Medication Sig Dispense Refill     Amlodipine Besylate-Valsartan  MG TABS Take 1 tablet by mouth daily 90 tablet 1     order for DME Equipment being ordered: CPAP equipment all supplies especially hoses and nose guards. 1 each 1     Allergies   Allergen Reactions     Penicillins      Recent Labs   Lab Test  10/26/17   0908  01/16/17   0948  07/19/16   0912  06/06/16   0809  03/21/16   1620   04/09/15   0852  10/22/13   1611   A1C  6.8*  6.6*  6.4*   --   6.4*   < >  6.1*  5.7   LDL   --   Cannot estimate LDL when triglyceride exceeds 400 mg/dL   Desirable:       <100 mg/dl  CORRECTED ON 01/16 AT 1941: PREVIOUSLY REPORTED AS Cannot estimate LDL when   triglyceride exceeds 400  mg/dL    147*   --    --    --    --   144*  133*   HDL   --   51   --    --    --    --   51  57   TRIG   --   608*   --    --    --    --   200*  173*   ALT   --    --   75*  74*  133*   --    --    --    CR  0.93  0.93  0.92  0.82  0.88   < >  0.81  0.79   GFRESTIMATED  83  83  84  >90  Non  GFR Calc    89   < >  >90  Non  GFR Calc    >90   GFRESTBLACK  >90  >90  CORRECTED ON 01/16 AT 1941: PREVIOUSLY REPORTED AS >90  GFR Calc    >90   GFR Calc    >90   GFR Calc    >90   GFR Calc     < >  >90   GFR Calc    >90   POTASSIUM  4.3  4.1  4.4  4.2  4.2   < >   --   4.5   TSH   --    --    --    --   2.54   --   1.09   --     < > = values in this interval not displayed.              Reviewed and updated as needed this visit by clinical staffTobacco  Allergies  Meds  Med Hx  Surg Hx  Fam Hx  Soc Hx        Reviewed and updated as needed this visit by Provider        Past Medical History:   Diagnosis Date     BMI 39.0-39.9,adult      Fatigue      GERD (gastroesophageal reflux disease)      Helicobacter pylori 1999     Hyperlipidemia LDL goal < 130      Hypertension      Lyme disease 06/2003     LISETTE (obstructive sleep apnea)-Moderate (AHI 21) 2/3/2012     Seasonal allergies      Tobacco abuse       Past Surgical History:   Procedure Laterality Date     KNEE SURGERY       ORTHOPEDIC SURGERY  2009    Right knee     ORTHOPEDIC SURGERY  1970's    Right ankle     rib surgery  1982    Top right side rib removed       ROS:  C: NEGATIVE for fever, chills, change in weight  I: NEGATIVE for worrisome rashes, moles or lesions  E: NEGATIVE for vision changes or irritation  ENT: NEGATIVE for ear, mouth and throat problems  R: NEGATIVE for significant cough or SOB  CV: NEGATIVE for chest pain, palpitations or peripheral edema  GI: NEGATIVE for nausea, abdominal pain, heartburn, or change in bowel habits   male:  "negative for dysuria, hematuria, decreased urinary stream, erectile dysfunction, urethral discharge  M: NEGATIVE for significant arthralgias or myalgia  N: NEGATIVE for weakness, dizziness or paresthesias  P: NEGATIVE for changes in mood or affect    OBJECTIVE:   BP (!) 148/92  Pulse 68  Temp 98.2  F (36.8  C) (Oral)  Ht 5' 8\" (1.727 m)  Wt 283 lb 6 oz (128.5 kg)  SpO2 96%  BMI 43.09 kg/m2  EXAM:  GENERAL: healthy, alert and no distress  EYES: Eyes grossly normal to inspection, PERRL and conjunctivae and sclerae normal  HENT: ear canals and TM's normal, nose and mouth without ulcers or lesions  NECK: no adenopathy, no asymmetry, masses, or scars and thyroid normal to palpation  RESP: lungs clear to auscultation - no rales, rhonchi or wheezes  CV: regular rate and rhythm, normal S1 S2, no S3 or S4, no murmur, click or rub, no peripheral edema and peripheral pulses strong  ABDOMEN: soft, nontender, no hepatosplenomegaly, no masses and bowel sounds normal  MS: no gross musculoskeletal defects noted, no edema  SKIN: no suspicious lesions or rashes  NEURO: Normal strength and tone, mentation intact and speech normal  Shoulder exam shows positive impingement signs are present with pain at high arc of abduction and forward flexion on left. There is tenderness of the lateral shoulder.    PSYCH: mentation appears normal, affect normal/bright    ASSESSMENT/PLAN:       ICD-10-CM    1. Encounter for routine adult health examination with abnormal findings Z00.01    2. Need for prophylactic vaccination and inoculation against influenza Z23 FLU VAC, SPLIT VIRUS IM > 3 YO (QUADRIVALENT) [16087]     Vaccine Administration, Initial [25555]   3. Type 2 diabetes mellitus with microalbuminuria, without long-term current use of insulin (H) E11.29     R80.9    4. Essential hypertension with goal blood pressure less than 140/90 I10 Amlodipine Besylate-Valsartan  MG TABS   recheck blood pressure in 1 mos. " "    COUNSELING:  Reviewed preventive health counseling, as reflected in patient instructions       Regular exercise       Healthy diet/nutrition         reports that he quit smoking about 2 years ago. His smoking use included Cigarettes. He has a 42.00 pack-year smoking history. He has never used smokeless tobacco.      Estimated body mass index is 43.09 kg/(m^2) as calculated from the following:    Height as of this encounter: 5' 8\" (1.727 m).    Weight as of this encounter: 283 lb 6 oz (128.5 kg).   Weight management plan: Discussed healthy diet and exercise guidelines and patient will follow up in 12 months in clinic to re-evaluate.    Counseling Resources:  ATP IV Guidelines  Pooled Cohorts Equation Calculator  FRAX Risk Assessment  ICSI Preventive Guidelines  Dietary Guidelines for Americans, 2010  USDA's MyPlate  ASA Prophylaxis  Lung CA Screening    Max Orantes PA-C  [facility]  Injectable Influenza Immunization Documentation    1.  Is the person to be vaccinated sick today?   No    2. Does the person to be vaccinated have an allergy to a component   of the vaccine?   No  Egg Allergy Algorithm Link    3. Has the person to be vaccinated ever had a serious reaction   to influenza vaccine in the past?   No    4. Has the person to be vaccinated ever had Guillain-Barré syndrome?   No    Form completed by Guido Huerta MA           "

## 2017-10-31 NOTE — NURSING NOTE
"Chief Complaint   Patient presents with     Physical       Initial BP (!) 148/92  Pulse 68  Temp 98.2  F (36.8  C) (Oral)  Ht 5' 8\" (1.727 m)  Wt 283 lb 6 oz (128.5 kg)  SpO2 96%  BMI 43.09 kg/m2 Estimated body mass index is 43.09 kg/(m^2) as calculated from the following:    Height as of this encounter: 5' 8\" (1.727 m).    Weight as of this encounter: 283 lb 6 oz (128.5 kg).  Medication Reconciliation: complete   Guido Huerta MA      "

## 2017-11-30 ENCOUNTER — OFFICE VISIT (OUTPATIENT)
Dept: FAMILY MEDICINE | Facility: CLINIC | Age: 62
End: 2017-11-30
Payer: COMMERCIAL

## 2017-11-30 VITALS
SYSTOLIC BLOOD PRESSURE: 122 MMHG | DIASTOLIC BLOOD PRESSURE: 79 MMHG | TEMPERATURE: 97.8 F | RESPIRATION RATE: 16 BRPM | BODY MASS INDEX: 42.74 KG/M2 | HEIGHT: 68 IN | HEART RATE: 81 BPM | OXYGEN SATURATION: 95 % | WEIGHT: 282 LBS

## 2017-11-30 DIAGNOSIS — E78.5 HYPERLIPIDEMIA LDL GOAL <100: Primary | ICD-10-CM

## 2017-11-30 DIAGNOSIS — M75.82 ROTATOR CUFF TENDINITIS, LEFT: ICD-10-CM

## 2017-11-30 DIAGNOSIS — J44.1 COPD EXACERBATION (H): ICD-10-CM

## 2017-11-30 DIAGNOSIS — I10 ESSENTIAL HYPERTENSION WITH GOAL BLOOD PRESSURE LESS THAN 140/90: ICD-10-CM

## 2017-11-30 LAB
CHOLEST SERPL-MCNC: 250 MG/DL
HDLC SERPL-MCNC: 57 MG/DL
LDLC SERPL CALC-MCNC: 154 MG/DL
NONHDLC SERPL-MCNC: 193 MG/DL
TRIGL SERPL-MCNC: 195 MG/DL

## 2017-11-30 PROCEDURE — 99213 OFFICE O/P EST LOW 20 MIN: CPT | Mod: 25 | Performed by: PHYSICIAN ASSISTANT

## 2017-11-30 PROCEDURE — 80061 LIPID PANEL: CPT | Performed by: PHYSICIAN ASSISTANT

## 2017-11-30 PROCEDURE — 36415 COLL VENOUS BLD VENIPUNCTURE: CPT | Performed by: PHYSICIAN ASSISTANT

## 2017-11-30 PROCEDURE — 20610 DRAIN/INJ JOINT/BURSA W/O US: CPT | Performed by: PHYSICIAN ASSISTANT

## 2017-11-30 RX ORDER — AMLODIPINE AND VALSARTAN 10; 160 MG/1; MG/1
1 TABLET ORAL DAILY
Qty: 90 TABLET | Refills: 1 | Status: CANCELLED | OUTPATIENT
Start: 2017-11-30

## 2017-11-30 RX ORDER — PREDNISONE 20 MG/1
20 TABLET ORAL 2 TIMES DAILY
Qty: 10 TABLET | Refills: 0 | Status: SHIPPED | OUTPATIENT
Start: 2017-11-30 | End: 2017-12-26

## 2017-11-30 RX ORDER — ALBUTEROL SULFATE 90 UG/1
2 AEROSOL, METERED RESPIRATORY (INHALATION) EVERY 6 HOURS PRN
Qty: 1 INHALER | Refills: 1 | Status: SHIPPED | OUTPATIENT
Start: 2017-11-30 | End: 2018-03-26

## 2017-11-30 RX ORDER — AMLODIPINE BESYLATE 10 MG/1
10 TABLET ORAL DAILY
COMMUNITY
End: 2018-05-13

## 2017-11-30 RX ORDER — VALSARTAN 160 MG/1
160 TABLET ORAL DAILY
COMMUNITY
End: 2018-05-13

## 2017-11-30 NOTE — PROGRESS NOTES
SUBJECTIVE:   Cooper Vargas is a 62 year old male who presents to clinic today for the following health issues:      Hyperlipidemia Follow-Up      Rate your low fat/cholesterol diet?: not monitoring fat    Taking statin?  Stopped due to all over body swelling     Other lipid medications/supplements?:  none    Hypertension Follow-up      Outpatient blood pressures are not being checked.    Low Salt Diet: not monitoring salt          Amount of exercise or physical activity: 2-3 days/week for an average of 15-30 minutes    Problems taking medications regularly: No    Medication side effects: none  Diet: regular (no restrictions)      Left shoulder pain noted again    Problem list and histories reviewed & adjusted, as indicated.  Additional history: as documented        Reviewed and updated as needed this visit by clinical staffTobacco  Allergies  Meds       Reviewed and updated as needed this visit by Provider         All other systems negative except as outline above  OBJECTIVE:  Eye exam - right eye normal lid, conjunctiva, cornea, pupil and fundus, left eye normal lid, conjunctiva, cornea, pupil and fundus.  Thyroid not palpable, not enlarged, no nodules detected.  CHEST:no tachypnea, retractions or cyanosis, mild inspiratory wheezing heard diffusely throughout both lungs, moderate expiratory wheezing heard diffusely throughout both lungs and S1, S2 normal, no murmur, no gallop, rate regular.  Shoulder exam shows positive impingement signs are present with pain at high arc of abduction and forward flexion on left. There is tenderness of the lateral shoulder.          The risks, benefits and potential complications (including but not limited to, bleeding, infection, pain, scar, damage to adjacent structures, atrophy or necrosis of soft tissue, skin blanching, failure to relieve symptoms) of injection were discussed with the patient. Questions were addressed and answered.The patient elected to proceed. Written  informed consent was obtained. The correct procedural site was identified and confirmed. A Left shoulder intraarticular injection was performed using 1mL Depo Medrol 40mg per mL and 2mL (0.25% marcaine) of local anesthetic after sterile prep, to the correct procedural site. Sterile bandaid applied. This was tolerated well by the patient. No apparent complications.Did also discuss that if diabetic, recommend close monitoring of blood sugars over the next week as cortisone injections can temporarily elevate blood sugars.  Cooper was seen today for lipids and hypertension.    Diagnoses and all orders for this visit:    Hyperlipidemia LDL goal <100  -     Lipid panel reflex to direct LDL Fasting    Essential hypertension with goal blood pressure less than 140/90    Rotator cuff tendinitis, left  -     DRAIN/INJECT LARGE JOINT/BURSA  -     DEPO MEDROL 40 MG    COPD exacerbation (H)  -     predniSONE (DELTASONE) 20 MG tablet; Take 1 tablet (20 mg) by mouth 2 times daily  -     albuterol (PROAIR HFA/PROVENTIL HFA/VENTOLIN HFA) 108 (90 BASE) MCG/ACT Inhaler; Inhale 2 puffs into the lungs every 6 hours as needed for shortness of breath / dyspnea or wheezing    Other orders  -     Cancel: Amlodipine Besylate-Valsartan  MG TABS; Take 1 tablet by mouth daily      Continue amlodipine 10 mg and valsartan 160 mg     work on lifestyle modification  Recheck blood pressure in 6 mos

## 2017-11-30 NOTE — MR AVS SNAPSHOT
"              After Visit Summary   2017    Cooper Vargas    MRN: [id number]           Patient Information     Date Of Birth          1955        Visit Information        Provider Department      2017 7:40 AM Max Orantes PA-C [facility] [place]        Today's Diagnoses     Hyperlipidemia LDL goal <100    -  1    Essential hypertension with goal blood pressure less than 140/90        Rotator cuff tendinitis, left        COPD exacerbation (H)           Follow-ups after your visit        Who to contact     Normal or non-critical lab and imaging results will be communicated to you by [employer], letter or phone within 4 business days after the clinic has received the results. If you do not hear from us within 7 days, please contact the clinic through [employer] or phone. If you have a critical or abnormal lab result, we will notify you by phone as soon as possible.  Submit refill requests through [employer] or call your pharmacy and they will forward the refill request to us. Please allow 3 business days for your refill to be completed.          If you need to speak with a  for additional information , please call: [phone]             Additional Information About Your Visit        MyChart Information     [employer] lets you send messages to your doctor, view your test results, renew your prescriptions, schedule appointments and more. To sign up, go to [email or web address] . Click on \"Log in\" on the left side of the screen, which will take you to the Welcome page. Then click on \"Sign up Now\" on the right side of the page.     You will be asked to enter the access code listed below, as well as some personal information. Please follow the directions to create your username and password.     Your access code is: [email or web address]  Expires: 2018  7:43 AM     Your access code will  in 90 days. If you need help or a new code, please call your [facility] clinic or [phone].   " "     Care EveryWhere ID     This is your Care EveryWhere ID. This could be used by other organizations to access your [facility] medical records  [id number]        Your Vitals Were     Pulse Temperature Respirations Height Pulse Oximetry BMI (Body Mass Index)    81 97.8  F (36.6  C) (Tympanic) 16 5' 8\" (1.727 m) 95% 42.88 kg/m2       Blood Pressure from Last 3 Encounters:   11/30/17 122/79   10/31/17 (!) 148/92   05/03/17 (!) 123/93    Weight from Last 3 Encounters:   11/30/17 282 lb (127.9 kg)   10/31/17 283 lb 6 oz (128.5 kg)   04/18/17 283 lb 3.2 oz (128.5 kg)              We Performed the Following     DEPO MEDROL 40 MG     DRAIN/INJECT LARGE JOINT/BURSA     Lipid panel reflex to direct LDL Fasting          Today's Medication Changes          These changes are accurate as of: 11/30/17  8:28 AM.  If you have any questions, ask your nurse or doctor.               Start taking these medicines.        Dose/Directions    albuterol 108 (90 BASE) MCG/ACT Inhaler   Commonly known as:  PROAIR HFA/PROVENTIL HFA/VENTOLIN HFA   Used for:  COPD exacerbation (H)   Started by:  Max Orantes PA-C        Dose:  2 puff   Inhale 2 puffs into the lungs every 6 hours as needed for shortness of breath / dyspnea or wheezing   Quantity:  1 Inhaler   Refills:  1       predniSONE 20 MG tablet   Commonly known as:  DELTASONE   Used for:  COPD exacerbation (H)   Started by:  Max Orantes PA-C        Dose:  20 mg   Take 1 tablet (20 mg) by mouth 2 times daily   Quantity:  10 tablet   Refills:  0            Where to get your medicines      These medications were sent to [facility] - [address]  [address] [address], [place] [address]     Phone:  [phone]     albuterol 108 (90 BASE) MCG/ACT Inhaler    predniSONE 20 MG tablet                Primary Care Provider Office Phone # Fax #    Max Orantes PA-C [phone] [address] [address] [place] [address]      "   Equal Access to Services     [facility] : Hadii aad ku hadelverstefan Mandiewaylon, wakizzyda luqadaha, qaybta kairishleigha barker. So [facility] [phone].    ATENCIÓN: Si habla español, tiene a leach disposición servicios gratuitos de asistencia lingüística. Quianaame al [phone].    We comply with applicable federal civil rights laws and [place] laws. We do not discriminate on the basis of race, color, national origin, age, disability, sex, sexual orientation, or gender identity.            Thank you!     Thank you for choosing [facility]  for your care. Our goal is always to provide you with excellent care. Hearing back from our patients is one way we can continue to improve our services. Please take a few minutes to complete the written survey that you may receive in the mail after your visit with us. Thank you!             Your Updated Medication List - Protect others around you: Learn how to safely use, store and throw away your medicines at www.disposemymeds.org.          This list is accurate as of: 11/30/17  8:28 AM.  Always use your most recent med list.                   Brand Name Dispense Instructions for use Diagnosis    albuterol 108 (90 BASE) MCG/ACT Inhaler    PROAIR HFA/PROVENTIL HFA/VENTOLIN HFA    1 Inhaler    Inhale 2 puffs into the lungs every 6 hours as needed for shortness of breath / dyspnea or wheezing    COPD exacerbation (H)       Amlodipine Besylate-Valsartan  MG Tabs     90 tablet    Take 1 tablet by mouth daily    Essential hypertension with goal blood pressure less than 140/90       order for DME     1 each    Equipment being ordered: CPAP equipment all supplies especially hoses and nose guards.    LISETTE (obstructive sleep apnea)       predniSONE 20 MG tablet    DELTASONE    10 tablet    Take 1 tablet (20 mg) by mouth 2 times daily    COPD exacerbation (H)

## 2017-12-11 RX ORDER — ATORVASTATIN CALCIUM 10 MG/1
10 TABLET, FILM COATED ORAL DAILY
Qty: 90 TABLET | Refills: 1 | Status: SHIPPED | OUTPATIENT
Start: 2017-12-11 | End: 2018-03-26

## 2017-12-26 ENCOUNTER — OFFICE VISIT (OUTPATIENT)
Dept: FAMILY MEDICINE | Facility: CLINIC | Age: 62
End: 2017-12-26
Payer: COMMERCIAL

## 2017-12-26 VITALS
TEMPERATURE: 98 F | RESPIRATION RATE: 16 BRPM | DIASTOLIC BLOOD PRESSURE: 79 MMHG | OXYGEN SATURATION: 94 % | HEART RATE: 92 BPM | SYSTOLIC BLOOD PRESSURE: 125 MMHG | BODY MASS INDEX: 44.4 KG/M2 | WEIGHT: 292 LBS

## 2017-12-26 DIAGNOSIS — M54.2 CERVICALGIA: ICD-10-CM

## 2017-12-26 DIAGNOSIS — M54.16 LUMBAR RADICULOPATHY: ICD-10-CM

## 2017-12-26 DIAGNOSIS — M54.2 TRIGGER POINT OF NECK: ICD-10-CM

## 2017-12-26 DIAGNOSIS — S33.5XXA LUMBAR SPRAIN, INITIAL ENCOUNTER: Primary | ICD-10-CM

## 2017-12-26 DIAGNOSIS — S86.912A KNEE STRAIN, LEFT, INITIAL ENCOUNTER: ICD-10-CM

## 2017-12-26 PROCEDURE — 99214 OFFICE O/P EST MOD 30 MIN: CPT | Performed by: PHYSICIAN ASSISTANT

## 2017-12-26 RX ORDER — DICLOFENAC SODIUM 75 MG/1
75 TABLET, DELAYED RELEASE ORAL 2 TIMES DAILY PRN
Qty: 30 TABLET | Refills: 1 | Status: SHIPPED | OUTPATIENT
Start: 2017-12-26 | End: 2018-03-26

## 2017-12-26 RX ORDER — CYCLOBENZAPRINE HCL 5 MG
5 TABLET ORAL 3 TIMES DAILY PRN
Qty: 30 TABLET | Refills: 0 | Status: SHIPPED | OUTPATIENT
Start: 2017-12-26 | End: 2018-03-26

## 2017-12-26 NOTE — MR AVS SNAPSHOT
"              After Visit Summary   2017    Cooper Vargas    MRN: [id number]           Patient Information     Date Of Birth          1955        Visit Information        Provider Department      2017 10:20 AM Max Orantes PA-C [facility] [place]        Today's Diagnoses     Lumbar sprain, initial encounter    -  1    Knee strain, left, initial encounter        Cervicalgia        Trigger point of neck        Lumbar radiculopathy           Follow-ups after your visit        Who to contact     Normal or non-critical lab and imaging results will be communicated to you by Pepperdatahart, letter or phone within 4 business days after the clinic has received the results. If you do not hear from us within 7 days, please contact the clinic through Pepperdatahart or phone. If you have a critical or abnormal lab result, we will notify you by phone as soon as possible.  Submit refill requests through [employer] or call your pharmacy and they will forward the refill request to us. Please allow 3 business days for your refill to be completed.          If you need to speak with a  for additional information , please call: [phone]             Additional Information About Your Visit        [employer] Information     [employer] lets you send messages to your doctor, view your test results, renew your prescriptions, schedule appointments and more. To sign up, go to [email or web address] . Click on \"Log in\" on the left side of the screen, which will take you to the Welcome page. Then click on \"Sign up Now\" on the right side of the page.     You will be asked to enter the access code listed below, as well as some personal information. Please follow the directions to create your username and password.     Your access code is: [email or web address]  Expires: 2018  7:43 AM     Your access code will  in 90 days. If you need help or a new code, please call your [facility] or [phone].        Care " EveryWhere ID     This is your Care EveryWhere ID. This could be used by other organizations to access your [place] medical records  [id number]        Your Vitals Were     Pulse Temperature Respirations Pulse Oximetry BMI (Body Mass Index)       92 98  F (36.7  C) (Tympanic) 16 94% 44.4 kg/m2        Blood Pressure from Last 3 Encounters:   12/26/17 125/79   11/30/17 122/79   10/31/17 (!) 148/92    Weight from Last 3 Encounters:   12/26/17 292 lb (132.5 kg)   11/30/17 282 lb (127.9 kg)   10/31/17 283 lb 6 oz (128.5 kg)              Today, you had the following     No orders found for display         Today's Medication Changes          These changes are accurate as of: 12/26/17 11:06 AM.  If you have any questions, ask your nurse or doctor.               Start taking these medicines.        Dose/Directions    cyclobenzaprine 5 MG tablet   Commonly known as:  FLEXERIL   Used for:  Lumbar radiculopathy   Started by:  Max Orantes PA-C        Dose:  5 mg   Take 1 tablet (5 mg) by mouth 3 times daily as needed for muscle spasms   Quantity:  30 tablet   Refills:  0       diclofenac 75 MG EC tablet   Commonly known as:  VOLTAREN   Used for:  Knee strain, left, initial encounter, Lumbar sprain, initial encounter, Cervicalgia, Trigger point of neck   Started by:  Max Orantes PA-C        Dose:  75 mg   Take 1 tablet (75 mg) by mouth 2 times daily as needed for moderate pain   Quantity:  30 tablet   Refills:  1         Stop taking these medicines if you haven't already. Please contact your care team if you have questions.     predniSONE 20 MG tablet   Commonly known as:  DELTASONE   Stopped by:  aMx Orantes PA-C                Where to get your medicines      These medications were sent to [facility] [place] [address] [facility] [place]  [address] [facility] [place] [address]     Phone:  [phone]     cyclobenzaprine 5 MG tablet    diclofenac 75 MG EC tablet                Primary  Care Provider Office Phone # Fax #    Max Orantes PA-C [phone]       [address] [place] [address]        Equal Access to Services     JAIMIE JAMESON : Hadii aad ku hadasho Soomaali, waaxda luqadaha, qaybta kaalmada adeegyada, waxphil shenn andrews blake laMansoormeagan rodriguez. So [facility] [phone].    ATENCIÓN: Si habla español, tiene a leach disposición servicios gratuitos de asistencia lingüística. Llame al [phone].    We comply with applicable federal civil rights laws and [place] laws. We do not discriminate on the basis of race, color, national origin, age, disability, sex, sexual orientation, or gender identity.            Thank you!     Thank you for choosing [facility]  for your care. Our goal is always to provide you with excellent care. Hearing back from our patients is one way we can continue to improve our services. Please take a few minutes to complete the written survey that you may receive in the mail after your visit with us. Thank you!             Your Updated Medication List - Protect others around you: Learn how to safely use, store and throw away your medicines at www.disposemymeds.org.          This list is accurate as of: 12/26/17 11:06 AM.  Always use your most recent med list.                   Brand Name Dispense Instructions for use Diagnosis    albuterol 108 (90 BASE) MCG/ACT Inhaler    PROAIR HFA/PROVENTIL HFA/VENTOLIN HFA    1 Inhaler    Inhale 2 puffs into the lungs every 6 hours as needed for shortness of breath / dyspnea or wheezing    COPD exacerbation (H)       amLODIPine 10 MG tablet    NORVASC     Take 10 mg by mouth daily        atorvastatin 10 MG tablet    LIPITOR    90 tablet    Take 1 tablet (10 mg) by mouth daily    Hyperlipidemia LDL goal <100       cyclobenzaprine 5 MG tablet    FLEXERIL    30 tablet    Take 1 tablet (5 mg) by mouth 3 times daily as needed for muscle spasms    Lumbar radiculopathy       diclofenac 75 MG EC tablet     VOLTAREN    30 tablet    Take 1 tablet (75 mg) by mouth 2 times daily as needed for moderate pain    Knee strain, left, initial encounter, Lumbar sprain, initial encounter, Cervicalgia, Trigger point of neck       order for DME     1 each    Equipment being ordered: CPAP equipment all supplies especially hoses and nose guards.    LISETTE (obstructive sleep apnea)       valsartan 160 MG tablet    DIOVAN     Take 160 mg by mouth daily
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
## 2018-03-26 ENCOUNTER — OFFICE VISIT (OUTPATIENT)
Dept: FAMILY MEDICINE | Facility: CLINIC | Age: 63
End: 2018-03-26
Payer: COMMERCIAL

## 2018-03-26 VITALS
HEART RATE: 85 BPM | WEIGHT: 290 LBS | RESPIRATION RATE: 18 BRPM | BODY MASS INDEX: 43.95 KG/M2 | OXYGEN SATURATION: 92 % | SYSTOLIC BLOOD PRESSURE: 129 MMHG | TEMPERATURE: 97.7 F | HEIGHT: 68 IN | DIASTOLIC BLOOD PRESSURE: 83 MMHG

## 2018-03-26 DIAGNOSIS — R05.9 COUGH: Primary | ICD-10-CM

## 2018-03-26 DIAGNOSIS — J44.1 COPD EXACERBATION (H): ICD-10-CM

## 2018-03-26 DIAGNOSIS — E11.29 TYPE 2 DIABETES MELLITUS WITH MICROALBUMINURIA, WITHOUT LONG-TERM CURRENT USE OF INSULIN (H): ICD-10-CM

## 2018-03-26 DIAGNOSIS — I10 ESSENTIAL HYPERTENSION WITH GOAL BLOOD PRESSURE LESS THAN 140/90: ICD-10-CM

## 2018-03-26 DIAGNOSIS — R80.9 TYPE 2 DIABETES MELLITUS WITH MICROALBUMINURIA, WITHOUT LONG-TERM CURRENT USE OF INSULIN (H): ICD-10-CM

## 2018-03-26 PROBLEM — E66.01 MORBID OBESITY (H): Status: ACTIVE | Noted: 2018-03-26

## 2018-03-26 LAB
CREAT UR-MCNC: 100 MG/DL
HBA1C MFR BLD: 6.9 % (ref 4.3–6)
MICROALBUMIN UR-MCNC: 46 MG/L
MICROALBUMIN/CREAT UR: 46.73 MG/G CR (ref 0–17)
TSH SERPL DL<=0.005 MIU/L-ACNC: 1.96 MU/L (ref 0.4–4)

## 2018-03-26 PROCEDURE — 84443 ASSAY THYROID STIM HORMONE: CPT | Performed by: PHYSICIAN ASSISTANT

## 2018-03-26 PROCEDURE — 83036 HEMOGLOBIN GLYCOSYLATED A1C: CPT | Performed by: PHYSICIAN ASSISTANT

## 2018-03-26 PROCEDURE — 82043 UR ALBUMIN QUANTITATIVE: CPT | Performed by: PHYSICIAN ASSISTANT

## 2018-03-26 PROCEDURE — 99207 C FOOT EXAM  NO CHARGE: CPT | Performed by: PHYSICIAN ASSISTANT

## 2018-03-26 PROCEDURE — 99214 OFFICE O/P EST MOD 30 MIN: CPT | Performed by: PHYSICIAN ASSISTANT

## 2018-03-26 PROCEDURE — 36415 COLL VENOUS BLD VENIPUNCTURE: CPT | Performed by: PHYSICIAN ASSISTANT

## 2018-03-26 RX ORDER — ALBUTEROL SULFATE 90 UG/1
2 AEROSOL, METERED RESPIRATORY (INHALATION) EVERY 6 HOURS PRN
Qty: 1 INHALER | Refills: 3 | Status: SHIPPED | OUTPATIENT
Start: 2018-03-26 | End: 2019-05-21

## 2018-03-26 RX ORDER — METFORMIN HCL 500 MG
TABLET, EXTENDED RELEASE 24 HR ORAL
Qty: 180 TABLET | Refills: 1 | Status: SHIPPED | OUTPATIENT
Start: 2018-03-26 | End: 2018-11-18

## 2018-03-26 RX ORDER — PREDNISONE 20 MG/1
20 TABLET ORAL 2 TIMES DAILY
Qty: 10 TABLET | Refills: 0 | Status: SHIPPED | OUTPATIENT
Start: 2018-03-26 | End: 2018-08-28

## 2018-03-26 RX ORDER — AZITHROMYCIN 250 MG/1
TABLET, FILM COATED ORAL
Qty: 6 TABLET | Refills: 0 | Status: SHIPPED | OUTPATIENT
Start: 2018-03-26 | End: 2018-08-28

## 2018-03-26 NOTE — PROGRESS NOTES
SUBJECTIVE:   Cooper Vargas is a 62 year old male who presents to clinic today for the following health issues:      Acute Illness   Acute illness concerns: cough  Onset: 3 months    Fever: no    Chills/Sweats: no    Headache (location?): no    Sinus Pressure:no    Conjunctivitis:  no    Ear Pain: no    Rhinorrhea: no    Congestion: YES-     Sore Throat: no     Cough: YES-productive of clear sputum    Wheeze: YES--COPD    Decreased Appetite: no    Nausea: no    Vomiting: no    Diarrhea:  no    Dysuria/Freq.: no    Fatigue/Achiness: YES    Sick/Strep Exposure: no     Therapies Tried and outcome: inhaler brief relief          Problem list and histories reviewed & adjusted, as indicated.  Additional history: as documented    BP Readings from Last 3 Encounters:   03/26/18 129/83   12/26/17 125/79   11/30/17 122/79    Wt Readings from Last 3 Encounters:   03/26/18 290 lb (131.5 kg)   12/26/17 292 lb (132.5 kg)   11/30/17 282 lb (127.9 kg)                recheck of obesity. Patient reminded to work on lifestyle changes.  Recheck of copd and DM     Reviewed and updated as needed this visit by clinical staff  Tobacco  Allergies  Meds  Problems  Med Hx  Surg Hx  Fam Hx  Soc Hx        Reviewed and updated as needed this visit by Provider  Tobacco  Allergies  Meds  Problems  Med Hx  Surg Hx  Fam Hx  Soc Hx          All other systems negative except as outline above  OBJECTIVE:  Eye exam - right eye normal lid, conjunctiva, cornea, pupil and fundus, left eye normal lid, conjunctiva, cornea, pupil and fundus.  Thyroid exam reveals thyroid is normal in size without nodules or tenderness.  CHEST:no tachypnea, retractions or cyanosis, mild inspiratory wheezing heard diffusely throughout both lungs, mild expiratory wheezing heard diffusely throughout both lungs, air entry reduced both lower lobes and S1, S2 normal, no murmur, no gallop, rate regular.  ENT exam reveals - bilateral TM normal without fluid or  infection, neck without nodes, throat normal without erythema or exudate, sinuses nontender, post nasal drip noted and nasal mucosa congested.  Examination of the feet reveals normal DP and PT pulses, no trophic changes or ulcerative lesions and reduced sensation at forefoot..    Cooper was seen today for cough.    Diagnoses and all orders for this visit:    Cough    COPD exacerbation (H)  -     albuterol (PROAIR HFA/PROVENTIL HFA/VENTOLIN HFA) 108 (90 BASE) MCG/ACT Inhaler; Inhale 2 puffs into the lungs every 6 hours as needed for shortness of breath / dyspnea or wheezing  -     azithromycin (ZITHROMAX) 250 MG tablet; Two tablets first day, then one tablet daily for four days.  -     predniSONE (DELTASONE) 20 MG tablet; Take 1 tablet (20 mg) by mouth 2 times daily    Type 2 diabetes mellitus with microalbuminuria, without long-term current use of insulin (H)  -     Hemoglobin A1c  -     TSH  -     FOOT EXAM  -     Albumin Random Urine Quantitative with Creat Ratio  -     STATIN NOT PRESCRIBED, INTENTIONAL,; 1 each daily Please choose reason not prescribed, below  -     metFORMIN (GLUCOPHAGE-XR) 500 MG 24 hr tablet; Take one tab every evening for 1 week, then increase to two tabs every evening thereafter    Essential hypertension with goal blood pressure less than 140/90    Other orders  -     Cancel: XR Chest 2 Views; Future      Advised supportive and symptomatic treatment.  Follow up with Provider - if condition persists or worsens.   work on lifestyle modification  Recheck of DM in 6 mos

## 2018-03-26 NOTE — LETTER
April 11, 2018      Cooper Vargas  [address]  [place] [address]        Dear ,    We are writing to inform you of your test results.    Your thyroid function came back within normal limits.     Resulted Orders   Hemoglobin A1c   Result Value Ref Range    Hemoglobin A1C 6.9 (H) 4.3 - 6.0 %   TSH   Result Value Ref Range    TSH 1.96 0.40 - 4.00 mU/L   Albumin Random Urine Quantitative with Creat Ratio   Result Value Ref Range    Creatinine Urine 100 mg/dL    Albumin Urine mg/L 46 mg/L    Albumin Urine mg/g Cr 46.73 (H) 0 - 17 mg/g Cr       If you have any questions or concerns, please call the clinic at the number listed above.       Sincerely,        Max Orantes PA-C/kamari

## 2018-03-26 NOTE — MR AVS SNAPSHOT
"              After Visit Summary   3/26/2018    Cooper Vargas    MRN: [id number]           Patient Information     Date Of Birth          1955        Visit Information        Provider Department      3/26/2018 11:20 AM [facility] Orantes [facility]        Today's Diagnoses     Cough    -  1    COPD exacerbation (H)        Type 2 diabetes mellitus with microalbuminuria, without long-term current use of insulin (H)        Essential hypertension with goal blood pressure less than 140/90           Follow-ups after your visit        Who to contact     Normal or non-critical lab and imaging results will be communicated to you by [employer], letter or phone within 4 business days after the clinic has received the results. If you do not hear from us within 7 days, please contact the clinic through [employer] or phone. If you have a critical or abnormal lab result, we will notify you by phone as soon as possible.  Submit refill requests through [employer] or call your pharmacy and they will forward the refill request to us. Please allow 3 business days for your refill to be completed.          If you need to speak with a  for additional information , please call: [phone]             Additional Information About Your Visit        [employer] Information     [employer] lets you send messages to your doctor, view your test results, renew your prescriptions, schedule appointments and more. To sign up, go to [email or web address] . Click on \"Log in\" on the left side of the screen, which will take you to the Welcome page. Then click on \"Sign up Now\" on the right side of the page.     You will be asked to enter the access code listed below, as well as some personal information. Please follow the directions to create your username and password.     Your access code is: [email or web address]  Expires: 2018 11:52 AM     Your access code will  in 90 days. If you need help or a new code, please call " "your [place] clinic or [phone].        Care EveryWhere ID     This is your Care EveryWhere ID. This could be used by other organizations to access your [place] medical records  [id number]        Your Vitals Were     Pulse Temperature Respirations Height Pulse Oximetry BMI (Body Mass Index)    85 97.7  F (36.5  C) (Tympanic) 18 5' 8\" (1.727 m) 92% 44.09 kg/m2       Blood Pressure from Last 3 Encounters:   03/26/18 129/83   12/26/17 125/79   11/30/17 122/79    Weight from Last 3 Encounters:   03/26/18 290 lb (131.5 kg)   12/26/17 292 lb (132.5 kg)   11/30/17 282 lb (127.9 kg)              We Performed the Following     Albumin Random Urine Quantitative with Creat Ratio     FOOT EXAM     Hemoglobin A1c     TSH          Today's Medication Changes          These changes are accurate as of 3/26/18 12:13 PM.  If you have any questions, ask your nurse or doctor.               Start taking these medicines.        Dose/Directions    azithromycin 250 MG tablet   Commonly known as:  ZITHROMAX   Used for:  COPD exacerbation (H)   Started by:  Max Orantes PA-C        Two tablets first day, then one tablet daily for four days.   Quantity:  6 tablet   Refills:  0       metFORMIN 500 MG 24 hr tablet   Commonly known as:  GLUCOPHAGE-XR   Used for:  Type 2 diabetes mellitus with microalbuminuria, without long-term current use of insulin (H)   Started by:  Max Orantes PA-C        Take one tab every evening for 1 week, then increase to two tabs every evening thereafter   Quantity:  180 tablet   Refills:  1       predniSONE 20 MG tablet   Commonly known as:  DELTASONE   Used for:  COPD exacerbation (H)   Started by:  Max Orantes PA-C        Dose:  20 mg   Take 1 tablet (20 mg) by mouth 2 times daily   Quantity:  10 tablet   Refills:  0       STATIN NOT PRESCRIBED (INTENTIONAL)   Used for:  Type 2 diabetes mellitus with microalbuminuria, without long-term current use of insulin (H)   Started by:  Max Orantes" LISHA Fair        Dose:  1 each   1 each daily Please choose reason not prescribed, below   Refills:  0         Stop taking these medicines if you haven't already. Please contact your care team if you have questions.     atorvastatin 10 MG tablet   Commonly known as:  LIPITOR   Stopped by:  Max Orantes PA-C           cyclobenzaprine 5 MG tablet   Commonly known as:  FLEXERIL   Stopped by:  Max Orantes PA-C           diclofenac 75 MG EC tablet   Commonly known as:  VOLTAREN   Stopped by:  Max Orantes PA-C                Where to get your medicines      These medications were sent to [facility] [address] [facility]  [address] [facility] [place] [address]     Phone:  [phone]     albuterol 108 (90 BASE) MCG/ACT Inhaler    azithromycin 250 MG tablet    metFORMIN 500 MG 24 hr tablet    predniSONE 20 MG tablet         Some of these will need a paper prescription and others can be bought over the counter.  Ask your nurse if you have questions.     You don't need a prescription for these medications     STATIN NOT PRESCRIBED (INTENTIONAL)                Primary Care Provider Office Phone # Fax #    Max Orantes PA-C [phone] [address] [facility]  [place] [address]        Equal Access to Services     [facility] : Hadii aad ku hadasho Soomaali, waaxda luqadaha, qaybta kaalmada adeegyada, waxay idiin hayjulion andrews rodriguez. So [facility] [phone].    ATENCIÓN: Si habla español, tiene a leach disposición servicios gratuitos de asistencia lingüística. Llame al [phone].    We comply with applicable federal civil rights laws and [place] laws. We do not discriminate on the basis of race, color, national origin, age, disability, sex, sexual orientation, or gender identity.            Thank you!     Thank you for choosing [facility] [place]  for your care. Our goal is always to provide you with excellent care. Hearing back from our  patients is one way we can continue to improve our services. Please take a few minutes to complete the written survey that you may receive in the mail after your visit with us. Thank you!             Your Updated Medication List - Protect others around you: Learn how to safely use, store and throw away your medicines at www.disposemymeds.org.          This list is accurate as of 3/26/18 12:13 PM.  Always use your most recent med list.                   Brand Name Dispense Instructions for use Diagnosis    albuterol 108 (90 BASE) MCG/ACT Inhaler    PROAIR HFA/PROVENTIL HFA/VENTOLIN HFA    1 Inhaler    Inhale 2 puffs into the lungs every 6 hours as needed for shortness of breath / dyspnea or wheezing    COPD exacerbation (H)       amLODIPine 10 MG tablet    NORVASC     Take 10 mg by mouth daily        azithromycin 250 MG tablet    ZITHROMAX    6 tablet    Two tablets first day, then one tablet daily for four days.    COPD exacerbation (H)       metFORMIN 500 MG 24 hr tablet    GLUCOPHAGE-XR    180 tablet    Take one tab every evening for 1 week, then increase to two tabs every evening thereafter    Type 2 diabetes mellitus with microalbuminuria, without long-term current use of insulin (H)       order for DME     1 each    Equipment being ordered: CPAP equipment all supplies especially hoses and nose guards.    LISETTE (obstructive sleep apnea)       predniSONE 20 MG tablet    DELTASONE    10 tablet    Take 1 tablet (20 mg) by mouth 2 times daily    COPD exacerbation (H)       STATIN NOT PRESCRIBED (INTENTIONAL)      1 each daily Please choose reason not prescribed, below    Type 2 diabetes mellitus with microalbuminuria, without long-term current use of insulin (H)       valsartan 160 MG tablet    DIOVAN     Take 160 mg by mouth daily

## 2018-04-02 PROBLEM — J44.1 COPD EXACERBATION (H): Status: ACTIVE | Noted: 2018-04-02

## 2018-08-03 ENCOUNTER — TELEPHONE (OUTPATIENT)
Dept: FAMILY MEDICINE | Facility: CLINIC | Age: 63
End: 2018-08-03

## 2018-08-03 DIAGNOSIS — I10 ESSENTIAL HYPERTENSION WITH GOAL BLOOD PRESSURE LESS THAN 140/90: Primary | ICD-10-CM

## 2018-08-03 DIAGNOSIS — R80.9 TYPE 2 DIABETES MELLITUS WITH MICROALBUMINURIA, WITHOUT LONG-TERM CURRENT USE OF INSULIN (H): ICD-10-CM

## 2018-08-03 DIAGNOSIS — E11.29 TYPE 2 DIABETES MELLITUS WITH MICROALBUMINURIA, WITHOUT LONG-TERM CURRENT USE OF INSULIN (H): ICD-10-CM

## 2018-08-03 RX ORDER — LOSARTAN POTASSIUM 100 MG/1
100 TABLET ORAL DAILY
Qty: 90 TABLET | Refills: 0 | Status: SHIPPED | OUTPATIENT
Start: 2018-08-03 | End: 2018-08-28

## 2018-08-03 NOTE — TELEPHONE ENCOUNTER
This patient is currently taking Valsartan 160mg which is a recalled product.  Would you like to switch patient to another ARB?      Please send new prescription to [facility].    Thanks,    Bj

## 2018-08-28 ENCOUNTER — OFFICE VISIT (OUTPATIENT)
Dept: FAMILY MEDICINE | Facility: CLINIC | Age: 63
End: 2018-08-28
Payer: COMMERCIAL

## 2018-08-28 ENCOUNTER — RADIANT APPOINTMENT (OUTPATIENT)
Dept: GENERAL RADIOLOGY | Facility: CLINIC | Age: 63
End: 2018-08-28
Attending: PHYSICIAN ASSISTANT
Payer: COMMERCIAL

## 2018-08-28 VITALS
DIASTOLIC BLOOD PRESSURE: 81 MMHG | TEMPERATURE: 98.2 F | HEIGHT: 69 IN | HEART RATE: 79 BPM | OXYGEN SATURATION: 96 % | WEIGHT: 281.25 LBS | SYSTOLIC BLOOD PRESSURE: 136 MMHG | BODY MASS INDEX: 41.66 KG/M2

## 2018-08-28 DIAGNOSIS — Z23 NEED FOR VACCINATION: ICD-10-CM

## 2018-08-28 DIAGNOSIS — S69.91XA HAND INJURY, RIGHT, INITIAL ENCOUNTER: ICD-10-CM

## 2018-08-28 DIAGNOSIS — R80.9 TYPE 2 DIABETES MELLITUS WITH MICROALBUMINURIA, WITHOUT LONG-TERM CURRENT USE OF INSULIN (H): ICD-10-CM

## 2018-08-28 DIAGNOSIS — S69.91XA HAND INJURY, RIGHT, INITIAL ENCOUNTER: Primary | ICD-10-CM

## 2018-08-28 DIAGNOSIS — E66.01 MORBID OBESITY (H): ICD-10-CM

## 2018-08-28 DIAGNOSIS — I10 ESSENTIAL HYPERTENSION WITH GOAL BLOOD PRESSURE LESS THAN 140/90: ICD-10-CM

## 2018-08-28 DIAGNOSIS — E11.29 TYPE 2 DIABETES MELLITUS WITH MICROALBUMINURIA, WITHOUT LONG-TERM CURRENT USE OF INSULIN (H): ICD-10-CM

## 2018-08-28 PROCEDURE — 90715 TDAP VACCINE 7 YRS/> IM: CPT | Performed by: PHYSICIAN ASSISTANT

## 2018-08-28 PROCEDURE — 90471 IMMUNIZATION ADMIN: CPT | Performed by: PHYSICIAN ASSISTANT

## 2018-08-28 PROCEDURE — 99213 OFFICE O/P EST LOW 20 MIN: CPT | Mod: 25 | Performed by: PHYSICIAN ASSISTANT

## 2018-08-28 PROCEDURE — 73130 X-RAY EXAM OF HAND: CPT | Mod: RT

## 2018-08-28 RX ORDER — LOSARTAN POTASSIUM 100 MG/1
100 TABLET ORAL DAILY
Qty: 90 TABLET | Refills: 0 | Status: SHIPPED | OUTPATIENT
Start: 2018-08-28 | End: 2019-01-23

## 2018-08-28 NOTE — MR AVS SNAPSHOT
"              After Visit Summary   8/28/2018    Cooper Vargas    MRN: [id number]           Patient Information     Date Of Birth          1955        Visit Information        Provider Department      8/28/2018 7:20 AM Max Orantes PA-C [facility]        Today's Diagnoses     Hand injury, right, initial encounter    -  1    Type 2 diabetes mellitus with microalbuminuria, without long-term current use of insulin (H)        Need for vaccination        Morbid obesity (H)        Essential hypertension with goal blood pressure less than 140/90           Follow-ups after your visit        Who to contact     Normal or non-critical lab and imaging results will be communicated to you by MyChart, letter or phone within 4 business days after the clinic has received the results. If you do not hear from us within 7 days, please contact the clinic through MyChart or phone. If you have a critical or abnormal lab result, we will notify you by phone as soon as possible.  Submit refill requests through "[employer]" or call your pharmacy and they will forward the refill request to us. Please allow 3 business days for your refill to be completed.          If you need to speak with a  for additional information , please call: [phone]             Additional Information About Your Visit        Care EveryWhere ID     This is your Care EveryWhere ID. This could be used by other organizations to access your [facility] medical records  [id number]        Your Vitals Were     Pulse Temperature Height Pulse Oximetry BMI (Body Mass Index)       79 98.2  F (36.8  C) (Oral) 5' 9\" (1.753 m) 96% 41.53 kg/m2        Blood Pressure from Last 3 Encounters:   08/28/18 136/81   03/26/18 129/83   12/26/17 125/79    Weight from Last 3 Encounters:   08/28/18 281 lb 4 oz (127.6 kg)   03/26/18 290 lb (131.5 kg)   12/26/17 292 lb (132.5 kg)              We Performed the Following     ADMIN 1st VACCINE     TDAP VACCINE " (ADACEL)          Where to get your medicines      These medications were sent to [facility] [place] - [address] [facility]  [address] [facility] [address]     Phone:  [phone]     losartan 100 MG tablet          Primary Care Provider Office Phone # Fax #    Max Manjula Orantes PA-C [phone] [address]  [place] [address]        Equal Access to Services     RAYMUNDO JAMESON : Hadii aad ku hadasho Soomaali, waaxda luqadaha, qaybta kaalmada adeegyada, waxay idiin hayaan adeeg kharash la'aan . So [facility] [phone].    ATENCIÓN: Si habla español, tiene a leach disposición servicios gratuitos de asistencia lingüística. [facility] [phone].    We comply with applicable federal civil rights laws and [place] laws. We do not discriminate on the basis of race, color, national origin, age, disability, sex, sexual orientation, or gender identity.            Thank you!     Thank you for choosing [facility]  for your care. Our goal is always to provide you with excellent care. Hearing back from our patients is one way we can continue to improve our services. Please take a few minutes to complete the written survey that you may receive in the mail after your visit with us. Thank you!             Your Updated Medication List - Protect others around you: Learn how to safely use, store and throw away your medicines at www.disposemymeds.org.          This list is accurate as of 8/28/18  8:05 AM.  Always use your most recent med list.                   Brand Name Dispense Instructions for use Diagnosis    albuterol 108 (90 Base) MCG/ACT inhaler    PROAIR HFA/PROVENTIL HFA/VENTOLIN HFA    1 Inhaler    Inhale 2 puffs into the lungs every 6 hours as needed for shortness of breath / dyspnea or wheezing    COPD exacerbation (H)       amLODIPine 10 MG tablet    NORVASC    90 tablet    TAKE ONE TABLET BY MOUTH EVERY DAY    Essential hypertension with goal blood  pressure less than 140/90       losartan 100 MG tablet    COZAAR    90 tablet    Take 1 tablet (100 mg) by mouth daily    Essential hypertension with goal blood pressure less than 140/90, Type 2 diabetes mellitus with microalbuminuria, without long-term current use of insulin (H)       metFORMIN 500 MG 24 hr tablet    GLUCOPHAGE-XR    180 tablet    Take one tab every evening for 1 week, then increase to two tabs every evening thereafter    Type 2 diabetes mellitus with microalbuminuria, without long-term current use of insulin (H)       order for DME     1 each    Equipment being ordered: CPAP equipment all supplies especially hoses and nose guards.    LISETTE (obstructive sleep apnea)       STATIN NOT PRESCRIBED (INTENTIONAL)      1 each daily Please choose reason not prescribed, below    Type 2 diabetes mellitus with microalbuminuria, without long-term current use of insulin (H)

## 2018-08-28 NOTE — PROGRESS NOTES
SUBJECTIVE:   Cooper Vargas is a 62 year old male who presents to clinic today for the following health issues:      Joint Pain    Onset: 1 month     Description:   Location: R hand across the knuckles   Character: Sharp and Dull ache    Intensity: moderate    Progression of Symptoms: same    Accompanying Signs & Symptoms:  Other symptoms: tingling, warmth and swelling    History:   Previous similar pain: no       Precipitating factors:   Trauma or overuse: YES    Alleviating factors:  Improved by: ice    Therapies Tried and outcome: ice with no improvement     Recheck of obesity. Patient to continue to work on lifestyle modification          Problem list and histories reviewed & adjusted, as indicated.  Additional history: as documented    Patient Active Problem List   Diagnosis     GERD (gastroesophageal reflux disease)     Plantar fasciitis     Fatigue     Helicobacter pylori infection     Lyme disease     Smoking     Obesity     Tremors, hands     Hyperlipidemia LDL goal <100     LISETTE (obstructive sleep apnea)-Moderate (AHI 21)     Advanced directives, counseling/discussion     Acute gouty arthritis     Bronchitis, mucopurulent recurrent (H)     Mixed simple and mucopurulent chronic bronchitis (H)     Essential hypertension with goal blood pressure less than 140/90     Type 2 diabetes mellitus with microalbuminuria, without long-term current use of insulin (H)     Morbid obesity (H)     COPD exacerbation (H)     Past Surgical History:   Procedure Laterality Date     KNEE SURGERY       ORTHOPEDIC SURGERY  2009    Right knee     ORTHOPEDIC SURGERY  1970's    Right ankle     rib surgery  1982    Top right side rib removed       Social History   Substance Use Topics     Smoking status: Former Smoker     Packs/day: 1.00     Years: 42.00     Types: Cigarettes     Quit date: 9/29/2015     Smokeless tobacco: Never Used     Alcohol use 0.0 oz/week     0 Standard drinks or equivalent per week      Comment: social  drinks on the weekends.12 drinks (imelda)weekly     Family History   Problem Relation Age of Onset     Diabetes Paternal Grandmother      Alzheimer Disease Paternal Grandmother      Neurologic Disorder Sister      migraines     Neurologic Disorder Son      Shaking of hands. Age 30 years         Current Outpatient Prescriptions   Medication Sig Dispense Refill     losartan (COZAAR) 100 MG tablet Take 1 tablet (100 mg) by mouth daily 90 tablet 0     albuterol (PROAIR HFA/PROVENTIL HFA/VENTOLIN HFA) 108 (90 BASE) MCG/ACT Inhaler Inhale 2 puffs into the lungs every 6 hours as needed for shortness of breath / dyspnea or wheezing 1 Inhaler 3     amLODIPine (NORVASC) 10 MG tablet TAKE ONE TABLET BY MOUTH EVERY DAY 90 tablet 1     metFORMIN (GLUCOPHAGE-XR) 500 MG 24 hr tablet Take one tab every evening for 1 week, then increase to two tabs every evening thereafter 180 tablet 1     order for DME Equipment being ordered: CPAP equipment all supplies especially hoses and nose guards. 1 each 1     STATIN NOT PRESCRIBED, INTENTIONAL, 1 each daily Please choose reason not prescribed, below       [DISCONTINUED] losartan (COZAAR) 100 MG tablet Take 1 tablet (100 mg) by mouth daily 90 tablet 0     Allergies   Allergen Reactions     Penicillins      Recent Labs   Lab Test  03/26/18   1157  11/30/17   0850  10/26/17   0908  01/16/17   0948  07/19/16   0912  06/06/16   0809  03/21/16   1620   04/09/15   0852   A1C  6.9*   --   6.8*  6.6*  6.4*   --   6.4*   < >  6.1*   LDL   --   154*   --   Cannot estimate LDL when triglyceride exceeds 400 mg/dL   Desirable:       <100 mg/dl  CORRECTED ON 01/16 AT 1941: PREVIOUSLY REPORTED AS Cannot estimate LDL when   triglyceride exceeds 400 mg/dL    147*   --    --    --    --   144*   HDL   --   57   --   51   --    --    --    --   51   TRIG   --   195*   --   608*   --    --    --    --   200*   ALT   --    --    --    --   75*  74*  133*   --    --    CR   --    --   0.93  0.93  0.92  0.82   0.88   < >  0.81   GFRESTIMATED   --    --   83  83  84  >90  Non  GFR Calc    89   < >  >90  Non  GFR Calc     GFRESTBLACK   --    --   >90  >90  CORRECTED ON 01/16 AT 1941: PREVIOUSLY REPORTED AS >90  GFR Calc    >90   GFR Calc    >90   GFR Calc    >90   GFR Calc     < >  >90   GFR Calc     POTASSIUM   --    --   4.3  4.1  4.4  4.2  4.2   < >   --    TSH  1.96   --    --    --    --    --   2.54   --   1.09    < > = values in this interval not displayed.      BP Readings from Last 3 Encounters:   08/28/18 136/81   03/26/18 129/83   12/26/17 125/79    Wt Readings from Last 3 Encounters:   08/28/18 281 lb 4 oz (127.6 kg)   03/26/18 290 lb (131.5 kg)   12/26/17 292 lb (132.5 kg)                  Labs reviewed in EPIC    Reviewed and updated as needed this visit by clinical staff  Tobacco  Allergies  Meds       Reviewed and updated as needed this visit by Provider         All other systems negative except as outline above  OBJECTIVE:  Right hand: modest swelling of the dorsum of the hand overlying the 2nd-4rth metatarsals. rom of fingers and wrist slightly limited. Strength reasonably normal.     Cooper was seen today for musculoskeletal problem.    Diagnoses and all orders for this visit:    Hand injury, right, initial encounter  -     XR Hand Right G/E 3 Views; Future    Type 2 diabetes mellitus with microalbuminuria, without long-term current use of insulin (H)  -     TDAP VACCINE (ADACEL)  -     ADMIN 1st VACCINE  -     Cancel: Hemoglobin A1c  -     Cancel: JUST IN CASE  -     losartan (COZAAR) 100 MG tablet; Take 1 tablet (100 mg) by mouth daily    Need for vaccination  -     TDAP VACCINE (ADACEL)  -     ADMIN 1st VACCINE  -     Cancel: Hemoglobin A1c  -     Cancel: JUST IN CASE    Morbid obesity (H)    Essential hypertension with goal blood pressure less than 140/90  -     losartan (COZAAR) 100 MG  tablet; Take 1 tablet (100 mg) by mouth daily      Advised supportive and symptomatic treatment.  Follow up with Provider - if condition persists or worsens.

## 2018-10-10 ENCOUNTER — OFFICE VISIT (OUTPATIENT)
Dept: FAMILY MEDICINE | Facility: CLINIC | Age: 63
End: 2018-10-10
Payer: COMMERCIAL

## 2018-10-10 ENCOUNTER — RADIANT APPOINTMENT (OUTPATIENT)
Dept: GENERAL RADIOLOGY | Facility: CLINIC | Age: 63
End: 2018-10-10
Attending: PHYSICIAN ASSISTANT
Payer: COMMERCIAL

## 2018-10-10 VITALS
OXYGEN SATURATION: 98 % | TEMPERATURE: 97.2 F | DIASTOLIC BLOOD PRESSURE: 84 MMHG | SYSTOLIC BLOOD PRESSURE: 130 MMHG | BODY MASS INDEX: 41.2 KG/M2 | RESPIRATION RATE: 18 BRPM | HEART RATE: 73 BPM | WEIGHT: 279 LBS

## 2018-10-10 DIAGNOSIS — E11.29 TYPE 2 DIABETES MELLITUS WITH MICROALBUMINURIA, WITHOUT LONG-TERM CURRENT USE OF INSULIN (H): ICD-10-CM

## 2018-10-10 DIAGNOSIS — R80.9 TYPE 2 DIABETES MELLITUS WITH MICROALBUMINURIA, WITHOUT LONG-TERM CURRENT USE OF INSULIN (H): ICD-10-CM

## 2018-10-10 DIAGNOSIS — I10 ESSENTIAL HYPERTENSION WITH GOAL BLOOD PRESSURE LESS THAN 140/90: ICD-10-CM

## 2018-10-10 DIAGNOSIS — J41.1 BRONCHITIS, MUCOPURULENT RECURRENT (H): ICD-10-CM

## 2018-10-10 DIAGNOSIS — Z23 NEED FOR PROPHYLACTIC VACCINATION AND INOCULATION AGAINST INFLUENZA: Primary | ICD-10-CM

## 2018-10-10 LAB
ANION GAP SERPL CALCULATED.3IONS-SCNC: 6 MMOL/L (ref 3–14)
BUN SERPL-MCNC: 15 MG/DL (ref 7–30)
CALCIUM SERPL-MCNC: 8.9 MG/DL (ref 8.5–10.1)
CHLORIDE SERPL-SCNC: 106 MMOL/L (ref 94–109)
CO2 SERPL-SCNC: 29 MMOL/L (ref 20–32)
CREAT SERPL-MCNC: 0.98 MG/DL (ref 0.66–1.25)
GFR SERPL CREATININE-BSD FRML MDRD: 78 ML/MIN/1.7M2
GLUCOSE SERPL-MCNC: 126 MG/DL (ref 70–99)
HBA1C MFR BLD: 6.5 % (ref 0–5.6)
POTASSIUM SERPL-SCNC: 4.6 MMOL/L (ref 3.4–5.3)
SODIUM SERPL-SCNC: 141 MMOL/L (ref 133–144)

## 2018-10-10 PROCEDURE — 71046 X-RAY EXAM CHEST 2 VIEWS: CPT | Mod: FY

## 2018-10-10 PROCEDURE — 80048 BASIC METABOLIC PNL TOTAL CA: CPT | Performed by: PHYSICIAN ASSISTANT

## 2018-10-10 PROCEDURE — 99214 OFFICE O/P EST MOD 30 MIN: CPT | Mod: 25 | Performed by: PHYSICIAN ASSISTANT

## 2018-10-10 PROCEDURE — 83036 HEMOGLOBIN GLYCOSYLATED A1C: CPT | Performed by: PHYSICIAN ASSISTANT

## 2018-10-10 PROCEDURE — 36415 COLL VENOUS BLD VENIPUNCTURE: CPT | Performed by: PHYSICIAN ASSISTANT

## 2018-10-10 PROCEDURE — 90686 IIV4 VACC NO PRSV 0.5 ML IM: CPT | Performed by: PHYSICIAN ASSISTANT

## 2018-10-10 PROCEDURE — 90471 IMMUNIZATION ADMIN: CPT | Performed by: PHYSICIAN ASSISTANT

## 2018-10-10 RX ORDER — PREDNISONE 20 MG/1
20 TABLET ORAL 2 TIMES DAILY
Qty: 14 TABLET | Refills: 0 | Status: SHIPPED | OUTPATIENT
Start: 2018-10-10 | End: 2019-02-12

## 2018-10-10 RX ORDER — VALSARTAN 160 MG/1
TABLET ORAL
COMMUNITY
Start: 2018-05-16 | End: 2018-10-10

## 2018-10-10 RX ORDER — DOXYCYCLINE 100 MG/1
100 TABLET ORAL 2 TIMES DAILY
Qty: 28 TABLET | Refills: 0 | Status: SHIPPED | OUTPATIENT
Start: 2018-10-10 | End: 2019-02-12

## 2018-10-10 NOTE — MR AVS SNAPSHOT
After Visit Summary   10/10/2018    Cooper Vargas    MRN: [id number]           Patient Information     Date Of Birth          1955        Visit Information        Provider Department      10/10/2018 11:40 AM Max Orantes PA-C [facility] [place]        Today's Diagnoses     Need for prophylactic vaccination and inoculation against influenza    -  1    Type 2 diabetes mellitus with microalbuminuria, without long-term current use of insulin (H)        Essential hypertension with goal blood pressure less than 140/90        Bronchitis, mucopurulent recurrent (H)           Follow-ups after your visit        Who to contact     Normal or non-critical lab and imaging results will be communicated to you by MyChart, letter or phone within 4 business days after the clinic has received the results. If you do not hear from us within 7 days, please contact the clinic through MyChart or phone. If you have a critical or abnormal lab result, we will notify you by phone as soon as possible.  Submit refill requests through [employer] or call your pharmacy and they will forward the refill request to us. Please allow 3 business days for your refill to be completed.          If you need to speak with a  for additional information , please call: [phone]             Additional Information About Your Visit        Care EveryWhere ID     This is your Care EveryWhere ID. This could be used by other organizations to access your [facility] medical records  [id number]        Your Vitals Were     Pulse Temperature Respirations Pulse Oximetry BMI (Body Mass Index)       73 97.2  F (36.2  C) (Tympanic) 18 98% 41.2 kg/m2        Blood Pressure from Last 3 Encounters:   10/10/18 130/84   08/28/18 136/81   03/26/18 129/83    Weight from Last 3 Encounters:   10/10/18 279 lb (126.6 kg)   08/28/18 281 lb 4 oz (127.6 kg)   03/26/18 290 lb (131.5 kg)              We Performed the Following     BASIC  METABOLIC PANEL     FLU VACCINE, SPLIT VIRUS, IM (QUADRIVALENT) [61400]- >3 YRS     HEMOGLOBIN A1C     Vaccine Administration, Initial [59492]     XR Chest 2 Views          Today's Medication Changes          These changes are accurate as of 10/10/18 12:34 PM.  If you have any questions, ask your nurse or doctor.               Start taking these medicines.        Dose/Directions    doxycycline Monohydrate 100 MG Tabs   Used for:  Bronchitis, mucopurulent recurrent (H)   Started by:  Max Orantes PA-C        Dose:  100 mg   Take 100 mg by mouth 2 times daily for 14 days   Quantity:  28 tablet   Refills:  0       mometasone-formoterol 200-5 MCG/ACT oral inhaler   Commonly known as:  DULERA   Used for:  Bronchitis, mucopurulent recurrent (H)   Started by:  Max Orantes PA-C        Dose:  2 puff   Inhale 2 puffs into the lungs 2 times daily   Quantity:  39 g   Refills:  1       predniSONE 20 MG tablet   Commonly known as:  DELTASONE   Used for:  Bronchitis, mucopurulent recurrent (H)   Started by:  Max Orantes PA-C        Dose:  20 mg   Take 1 tablet (20 mg) by mouth 2 times daily for 7 days   Quantity:  14 tablet   Refills:  0         Stop taking these medicines if you haven't already. Please contact your care team if you have questions.     valsartan 160 MG tablet   Commonly known as:  DIOVAN   Stopped by:  Max Orantes PA-C                Where to get your medicines      These medications were sent to [facility] [place] - [address] [facility]  [address] [facility] [address]     Phone:  [phone]     doxycycline Monohydrate 100 MG Tabs    mometasone-formoterol 200-5 MCG/ACT oral inhaler    predniSONE 20 MG tablet                Primary Care Provider Office Phone # Fax #    Max Orantes PA-C [phone] [address]  [place] [address]        Equal Access to Services     JAIMIE JAMESON AH: brian Wright  koffi billyroberto chavezleigha ceballos. So [facility] [phone].    ATENCIÓN: Si yvonne velázquez, tiene a leach disposición servicios gratuitos de asistencia lingüística. Caden al [phone].    We comply with applicable federal civil rights laws and [place] laws. We do not discriminate on the basis of race, color, national origin, age, disability, sex, sexual orientation, or gender identity.            Thank you!     Thank you for choosing [facility]  for your care. Our goal is always to provide you with excellent care. Hearing back from our patients is one way we can continue to improve our services. Please take a few minutes to complete the written survey that you may receive in the mail after your visit with us. Thank you!             Your Updated Medication List - Protect others around you: Learn how to safely use, store and throw away your medicines at www.disposemymeds.org.          This list is accurate as of 10/10/18 12:34 PM.  Always use your most recent med list.                   Brand Name Dispense Instructions for use Diagnosis    albuterol 108 (90 Base) MCG/ACT inhaler    PROAIR HFA/PROVENTIL HFA/VENTOLIN HFA    1 Inhaler    Inhale 2 puffs into the lungs every 6 hours as needed for shortness of breath / dyspnea or wheezing    COPD exacerbation (H)       amLODIPine 10 MG tablet    NORVASC    90 tablet    TAKE ONE TABLET BY MOUTH EVERY DAY    Essential hypertension with goal blood pressure less than 140/90       doxycycline Monohydrate 100 MG Tabs     28 tablet    Take 100 mg by mouth 2 times daily for 14 days    Bronchitis, mucopurulent recurrent (H)       losartan 100 MG tablet    COZAAR    90 tablet    Take 1 tablet (100 mg) by mouth daily    Essential hypertension with goal blood pressure less than 140/90, Type 2 diabetes mellitus with microalbuminuria, without long-term current use of insulin (H)       metFORMIN 500 MG 24 hr tablet    GLUCOPHAGE-XR     180 tablet    Take one tab every evening for 1 week, then increase to two tabs every evening thereafter    Type 2 diabetes mellitus with microalbuminuria, without long-term current use of insulin (H)       mometasone-formoterol 200-5 MCG/ACT oral inhaler    DULERA    39 g    Inhale 2 puffs into the lungs 2 times daily    Bronchitis, mucopurulent recurrent (H)       order for DME     1 each    Equipment being ordered: CPAP equipment all supplies especially hoses and nose guards.    LISETTE (obstructive sleep apnea)       predniSONE 20 MG tablet    DELTASONE    14 tablet    Take 1 tablet (20 mg) by mouth 2 times daily for 7 days    Bronchitis, mucopurulent recurrent (H)       STATIN NOT PRESCRIBED (INTENTIONAL)      1 each daily Please choose reason not prescribed, below    Type 2 diabetes mellitus with microalbuminuria, without long-term current use of insulin (H)

## 2018-10-10 NOTE — LETTER
October 25, 2018      Cooper Vargas  [address] [address]   [place] [address]        Dear ,    We are writing to inform you of your test results.    Your recent kidney function came back nice and stable/normal.    Resulted Orders   BASIC METABOLIC PANEL   Result Value Ref Range    Sodium 141 133 - 144 mmol/L    Potassium 4.6 3.4 - 5.3 mmol/L    Chloride 106 94 - 109 mmol/L    Carbon Dioxide 29 20 - 32 mmol/L    Anion Gap 6 3 - 14 mmol/L    Glucose 126 (H) 70 - 99 mg/dL    Urea Nitrogen 15 7 - 30 mg/dL    Creatinine 0.98 0.66 - 1.25 mg/dL    GFR Estimate 78 >60 mL/min/1.7m2      Comment:      Non  GFR Calc    GFR Estimate If Black >90 >60 mL/min/1.7m2      Comment:       GFR Calc    Calcium 8.9 8.5 - 10.1 mg/dL   HEMOGLOBIN A1C   Result Value Ref Range    Hemoglobin A1C 6.5 (H) 0 - 5.6 %      Comment:      Normal <5.7% Prediabetes 5.7-6.4%  Diabetes 6.5% or higher - adopted from ADA   consensus guidelines.         If you have any questions or concerns, please call the clinic at the number listed above.       Sincerely,        Max Orantes PA-C/kamari

## 2018-10-10 NOTE — PROGRESS NOTES
"SUBJECTIVE:  Cooper Vargas is a 63 year old year old male who presents with the following concerns;              Symptoms: Present Comment   Fever/Chills x    Fatigue x    Muscle Aches x    Eye Irritation x    Sneezing     Nasal Ac/Drg x    Sinus Pressure/Pain x    Loss of smell x    Dental pain     Sore Throat     Swollen Glands x    Ear Pain/Fullness x Bilateral, right hurts and discharge, pt using qtips   Cough x Clear mucous   Wheeze x    Chest Pain x tightness   Shortness of breath x    Rash     Other x Pain moved into lower back     Symptom duration:  x 6 weeks   Sympom severity:  moderate   Treatments tried:  inhaler   Contacts:  none     Recheck of his dm and htn.   Medications updated and reviewed.  Past, family and surgical history is updated and reviewed in the record.  Patient Active Problem List    Diagnosis Date Noted     COPD exacerbation (H) 04/02/2018     Priority: Medium     Morbid obesity (H) 03/26/2018     Priority: Medium     Type 2 diabetes mellitus with microalbuminuria, without long-term current use of insulin (H) 01/16/2017     Priority: Medium     Essential hypertension with goal blood pressure less than 140/90 07/19/2016     Priority: Medium     Mixed simple and mucopurulent chronic bronchitis (H) 03/22/2016     Priority: Medium     Acute gouty arthritis 02/09/2016     Priority: Medium     Bronchitis, mucopurulent recurrent (H) 01/19/2016     Priority: Medium     Advanced directives, counseling/discussion 12/22/2015     Priority: Medium     Advance Care Planning 12/22/2015: ACP Review of Chart / Resources Provided:  Reviewed chart for advance care plan.  Cooper Vargas has no plan or code status on file however states presence of ACP document. Copy requested. . Added by Peri Posey            LISETTE (obstructive sleep apnea)-Moderate (AHI 21) 02/03/2012     Priority: Medium     Indications for Polysomnography 1/30/12: The patient is a 56 y year old Male who is 5' 9\" and weighs " 287.1 lbs.  His BMI equals 42.5.  The patients [place] sleepiness scale was 7 and neck size was 18.5.  A diagnostic polysomnogram was performed to evaluate for snoring, non-restorative sleep and possible LISETTE. After 124.0 minutes of sleep time the patient exhibited sufficient respiratory events qualifying him for a CPAP trial which was then initiated.      Diagnostic PSG  Sleep Architecture:  The total recording time of the diagnostic portion of the study was 183.6 minutes.  The total sleep time was 124.0 minutes.  During the diagnostic portion of the study the sleep latency was 21.6 minutes with Ambien.  REM latency was N/A minutes.  Sleep Efficiency was 67.5%.  Wake after sleep onset was 33.0.   The patient spent 23.0% of total sleep time in Stage N1, 37.1% in Stage N2, 39.9% in Stages N3 and 0.0% in REM.         Respiration:     Sustained Sleep Associated Hypoventilation -was not monitored.    Sleep Associated Hypoxemia - was present.  Baseline oxygen saturation was 92.0%.  The lowest oxygen saturation was 86.1%.  Snoring was reported as loud.     Events - During the diagnostic portion of the study, the polysomnogram revealed a presence of 30 obstructive, no central, or mixed apneas resulting in an Apnea index of 14.5 events per hour.  There were 13 hypopneas resulting in a Hypopnea index of 6.3 events per hour.  The combined Apnea/Hypopnea Index was 20.8 events per hour.  The REM AHI was N/A.  The RERA index was 19.8 per hour.   The RDI was 40.6  .     Treatment PSG  Sleep Architecture:  At 12:57 am the patient was placed on CPAP treatment and was titrated at pressures ranging from 5 cm/H20 up to 10 cm/H20.  The total recording time of the treatment portion of the study was 335.5 minutes.  The total sleep time was 272.0 minutes.  During the treatment portion of the study the sleep latency was 43.0 minutes.  REM latency was 62.0 minutes.  Sleep Efficiency was 81.1% and improved over baseline.  Sleep Maintenance  Efficiency was 93.0%.  Total wake time was 61.5 minutes for a total wake percentage of 6.2%. the patient spent 6.6% of total sleep time in Stage N1, 32.0% in Stage N2, 40.3% in Stages N3 and N, and 21.1% in REM.   Sleep efficiency improved and sleep architecture more consolidated on CPAP.    Respiration:  The optimal pressure was 10.0 with an AHI of 2.6 including REM supine. Flow limitation persisted at 10 cmH2O.    Movement Activity:      Limb - During the diagnostic portion of the study, there were 27 limb movements recorded.  Of this total, 24 were classified as PLMs.  Of the PLMs, 1 were associated with arousals.  The Limb Movement index was 13.1 per hour while the PLM index was 11.6 per hour.    Behavior -None     Bruxism - None    Seizure - None      CARDIAC SUMMARY:   During the diagnostic portion of the study, the average pulse rate was 77.1 bpm.  The minimum pulse rate was 64.0 bpm while the maximum pulse rate was 99.0 bpm.    During the treatment portion of the study, the average pulse rate was 76.8 bpm.  The minimum pulse rate was 57.0 bpm while the maximum pulse rate was 105.0 bpm.     Assessment:    Moderate LISETTE (AHI 21) with adequate positive airway pressure titration including REM supine.  Recommendations:    CPAP pressure 10 cmH2O with clinical follow-up within 3 - 4 weeks including compliance measures.    If symptoms not controlled despite CPAP compliance, consider increasing PAP to 11 or 12 cmH2O.    CPAP order placed 2/3/12 for 10 cmH2O.       Hyperlipidemia LDL goal <100 01/31/2012     Priority: Medium     Tremors, hands 09/27/2011     Priority: Medium     Problem list name updated by automated process. Provider to review and confirm       Smoking 03/17/2011     Priority: Medium     Obesity 03/17/2011     Priority: Medium     Fatigue      Priority: Medium     Helicobacter pylori infection      Priority: Medium     (Problem list name updated by automated process. Provider to review and confirm.)        GERD (gastroesophageal reflux disease) 11/10/2009     Priority: Medium     Plantar fasciitis 11/10/2009     Priority: Medium     Lyme disease 06/01/2003     Priority: Medium     Past Medical History:   Diagnosis Date     BMI 39.0-39.9,adult      Fatigue      GERD (gastroesophageal reflux disease)      Helicobacter pylori 1999     Hyperlipidemia LDL goal < 130      Hypertension      Lyme disease 06/2003     LISETTE (obstructive sleep apnea)-Moderate (AHI 21) 2/3/2012     Seasonal allergies      Tobacco abuse       Family History   Problem Relation Age of Onset     Diabetes Paternal Grandmother      Alzheimer Disease Paternal Grandmother      Neurologic Disorder Sister      migraines     Neurologic Disorder Son      Shaking of hands. Age 30 years     ROS:  Other than noted above, general, HEENT, respiratory, cardiac and gastrointestinal systems are negative.  OBJECTIVE:  ENT exam reveals - bilateral TM normal without fluid or infection, neck without nodes, throat normal without erythema or exudate, sinuses nontender, post nasal drip noted, nasal mucosa congested and nasal mucosa pale and congested.  CHEST:no tachypnea, retractions or cyanosis, mild inspiratory wheezing heard diffusely throughout both lungs, moderate expiratory wheezing heard diffusely throughout both lungs and S1, S2 normal, no murmur, no gallop, rate regular.  Foot exam - both sides normal; no swelling, tenderness or skin or vascular lesions. Color and temperature is normal. Sensation is intact. Peripheral pulses are palpable. Toenails are normal.      Cooper was seen today for cough.    Diagnoses and all orders for this visit:    Need for prophylactic vaccination and inoculation against influenza  -     FLU VACCINE, SPLIT VIRUS, IM (QUADRIVALENT) [57394]- >3 YRS  -     Vaccine Administration, Initial [35293]    Type 2 diabetes mellitus with microalbuminuria, without long-term current use of insulin (H)  -     BASIC METABOLIC PANEL  -     HEMOGLOBIN  A1C    Essential hypertension with goal blood pressure less than 140/90  -     BASIC METABOLIC PANEL    Bronchitis, mucopurulent recurrent (H)  -     Discontinue: mometasone-formoterol (DULERA) 200-5 MCG/ACT oral inhaler; Inhale 2 puffs into the lungs 2 times daily  -     XR Chest 2 Views  -     doxycycline Monohydrate 100 MG TABS; Take 100 mg by mouth 2 times daily for 14 days  -     predniSONE (DELTASONE) 20 MG tablet; Take 1 tablet (20 mg) by mouth 2 times daily for 7 days  -     fluticasone-vilanterol (BREO ELLIPTA) 200-25 MCG/INH inhaler; Inhale 1 puff into the lungs daily      Advised supportive and symptomatic treatment.  Follow up with Provider - if condition persists or worsens.   work on lifestyle modification  Recheck in 6 mos            Injectable Influenza Immunization Documentation    1.  Is the person to be vaccinated sick today?   No    2. Does the person to be vaccinated have an allergy to a component   of the vaccine?   No  Egg Allergy Algorithm Link    3. Has the person to be vaccinated ever had a serious reaction   to influenza vaccine in the past?   No    4. Has the person to be vaccinated ever had Guillain-Barré syndrome?   No    Form completed by jemma almanzar

## 2018-11-11 DIAGNOSIS — I10 ESSENTIAL HYPERTENSION WITH GOAL BLOOD PRESSURE LESS THAN 140/90: ICD-10-CM

## 2018-11-12 RX ORDER — AMLODIPINE BESYLATE 10 MG/1
TABLET ORAL
Qty: 90 TABLET | Refills: 1 | Status: SHIPPED | OUTPATIENT
Start: 2018-11-12 | End: 2019-05-05

## 2018-11-12 NOTE — TELEPHONE ENCOUNTER
"Requested Prescriptions   Pending Prescriptions Disp Refills     amLODIPine (NORVASC) 10 MG tablet [Pharmacy Med Name: AMLODIPINE BESYLATE 10MG TABS] 90 tablet 1    Last Written Prescription Date:  8-13-18  Last Fill Quantity: 90,  # refills: 1   Last office visit: 10/10/2018 with prescribing provider:  10-10-18   Future Office Visit:   Sig: TAKE ONE TABLET BY MOUTH EVERY DAY    Calcium Channel Blockers Protocol  Passed    11/11/2018 10:07 AM       Passed - Blood pressure under 140/90 in past 12 months    BP Readings from Last 3 Encounters:   10/10/18 130/84   08/28/18 136/81   03/26/18 129/83                Passed - Recent (12 mo) or future (30 days) visit within the authorizing provider's specialty    Patient had office visit in the last 12 months or has a visit in the next 30 days with authorizing provider or within the authorizing provider's specialty.  See \"Patient Info\" tab in inbasket, or \"Choose Columns\" in Meds & Orders section of the refill encounter.             Passed - Patient is age 18 or older       Passed - Normal serum creatinine on file in past 12 months    Recent Labs   Lab Test  10/10/18   1211   CR  0.98             "

## 2018-11-18 DIAGNOSIS — E11.29 TYPE 2 DIABETES MELLITUS WITH MICROALBUMINURIA, WITHOUT LONG-TERM CURRENT USE OF INSULIN (H): ICD-10-CM

## 2018-11-18 DIAGNOSIS — R80.9 TYPE 2 DIABETES MELLITUS WITH MICROALBUMINURIA, WITHOUT LONG-TERM CURRENT USE OF INSULIN (H): ICD-10-CM

## 2018-11-19 NOTE — TELEPHONE ENCOUNTER
"Requested Prescriptions   Pending Prescriptions Disp Refills     metFORMIN (GLUCOPHAGE-XR) 500 MG 24 hr tablet [Pharmacy Med Name: METFORMIN HCL ER 500MG TB24] 180 tablet 1     Sig: TAKE ONE TABLET BY MOUTH EVERY EVENING FOR ONE WEEK, THEN INCREASE TO TWO TABLETS EVERY EVENING THEREAFTER.    Biguanide Agents Passed    11/18/2018  7:37 AM       Passed - Blood pressure less than 140/90 in past 6 months    BP Readings from Last 3 Encounters:   10/10/18 130/84   08/28/18 136/81   03/26/18 129/83                Passed - Patient has documented LDL within the past 12 mos.    Recent Labs   Lab Test  11/30/17   0850   LDL  154*            Passed - Patient has had a Microalbumin in the past 15 mos.    Recent Labs   Lab Test  03/26/18   1157   MICROL  46   UMALCR  46.73*            Passed - Patient is age 10 or older       Passed - Patient has documented A1c within the specified period of time.    If HgbA1C is 8 or greater, it needs to be on file within the past 3 months.  If less than 8, must be on file within the past 6 months.     Recent Labs   Lab Test  10/10/18   1211   A1C  6.5*            Passed - Patient's CR is NOT>1.4 OR Patient's EGFR is NOT<45 within past 12 mos.    Recent Labs   Lab Test  10/10/18   1211   GFRESTIMATED  78   GFRESTBLACK  >90       Recent Labs   Lab Test  10/10/18   1211   CR  0.98            Passed - Patient does NOT have a diagnosis of CHF.       Passed - Recent (6 mo) or future (30 days) visit within the authorizing provider's specialty    Patient had office visit in the last 6 months or has a visit in the next 30 days with authorizing provider or within the authorizing provider's specialty.  See \"Patient Info\" tab in inbasket, or \"Choose Columns\" in Meds & Orders section of the refill encounter.            Last Written Prescription Date:  3/26/18  Last Fill Quantity: 180,  # refills: 1   Last office visit: 10/10/2018 with prescribing provider:  Lamberto   Future Office Visit:      "

## 2018-11-20 NOTE — TELEPHONE ENCOUNTER
Routing refill request to provider for review/approval because:  Pt in on 10/10/18 and not due for recheck for 6 more months.   LDL expires on 11/30/18  Will route to pcp to approve 6 month refill with reminder.

## 2018-11-21 RX ORDER — METFORMIN HCL 500 MG
TABLET, EXTENDED RELEASE 24 HR ORAL
Qty: 180 TABLET | Refills: 1 | Status: SHIPPED | OUTPATIENT
Start: 2018-11-21 | End: 2019-05-21

## 2019-01-23 ENCOUNTER — OFFICE VISIT (OUTPATIENT)
Dept: FAMILY MEDICINE | Facility: CLINIC | Age: 64
End: 2019-01-23
Payer: COMMERCIAL

## 2019-01-23 ENCOUNTER — ANCILLARY PROCEDURE (OUTPATIENT)
Dept: GENERAL RADIOLOGY | Facility: CLINIC | Age: 64
End: 2019-01-23
Payer: COMMERCIAL

## 2019-01-23 VITALS
RESPIRATION RATE: 16 BRPM | HEIGHT: 69 IN | WEIGHT: 285 LBS | DIASTOLIC BLOOD PRESSURE: 76 MMHG | BODY MASS INDEX: 42.21 KG/M2 | SYSTOLIC BLOOD PRESSURE: 113 MMHG | TEMPERATURE: 98 F | OXYGEN SATURATION: 93 % | HEART RATE: 87 BPM

## 2019-01-23 DIAGNOSIS — S49.92XA SHOULDER INJURY, LEFT, INITIAL ENCOUNTER: ICD-10-CM

## 2019-01-23 DIAGNOSIS — R80.9 TYPE 2 DIABETES MELLITUS WITH MICROALBUMINURIA, WITHOUT LONG-TERM CURRENT USE OF INSULIN (H): ICD-10-CM

## 2019-01-23 DIAGNOSIS — E11.29 TYPE 2 DIABETES MELLITUS WITH MICROALBUMINURIA, WITHOUT LONG-TERM CURRENT USE OF INSULIN (H): ICD-10-CM

## 2019-01-23 DIAGNOSIS — I10 ESSENTIAL HYPERTENSION WITH GOAL BLOOD PRESSURE LESS THAN 140/90: ICD-10-CM

## 2019-01-23 DIAGNOSIS — S49.92XA SHOULDER INJURY, LEFT, INITIAL ENCOUNTER: Primary | ICD-10-CM

## 2019-01-23 DIAGNOSIS — J44.1 COPD EXACERBATION (H): ICD-10-CM

## 2019-01-23 PROCEDURE — 73030 X-RAY EXAM OF SHOULDER: CPT | Mod: LT

## 2019-01-23 PROCEDURE — 99213 OFFICE O/P EST LOW 20 MIN: CPT | Performed by: PHYSICIAN ASSISTANT

## 2019-01-23 RX ORDER — LOSARTAN POTASSIUM 100 MG/1
100 TABLET ORAL DAILY
Qty: 90 TABLET | Refills: 0 | Status: SHIPPED | OUTPATIENT
Start: 2019-01-23 | End: 2019-05-05

## 2019-01-23 ASSESSMENT — MIFFLIN-ST. JEOR: SCORE: 2088.13

## 2019-01-23 NOTE — PROGRESS NOTES
SUBJECTIVE:   Cooper Vargas is a 63 year old male who presents to clinic today for the following health issues:      Hypertension Follow-up      Outpatient blood pressures are not being checked.    Low Salt Diet: not monitoring salt      Amount of exercise or physical activity: None    Problems taking medications regularly: No    Medication side effects: none    Diet: regular (no restrictions)      Musculoskeletal problem/pain      Duration: 5 weeks    Description  Location: left shoulder    Intensity:  moderate    Accompanying signs and symptoms: numbness and tingling    History  Previous similar problem: YES--also fell on ice 5 weeks ago  Previous evaluation:  none    Precipitating or alleviating factors:  Trauma or overuse: YES- fell on ice 5 weeks ago  Aggravating factors include: overuse    Therapies tried and outcome: injection with success    Decrease shoulder rom.       Problem list and histories reviewed & adjusted, as indicated.  Additional history: as documented    BP Readings from Last 3 Encounters:   01/23/19 113/76   10/10/18 130/84   08/28/18 136/81    Wt Readings from Last 3 Encounters:   01/23/19 129.3 kg (285 lb)   10/10/18 126.6 kg (279 lb)   08/28/18 127.6 kg (281 lb 4 oz)                    Reviewed and updated as needed this visit by clinical staff  Tobacco  Allergies  Meds       Reviewed and updated as needed this visit by Provider         All other systems negative except as outline above  OBJECTIVE:  Eye exam - right eye normal lid, conjunctiva, cornea, pupil and fundus, left eye normal lid, conjunctiva, cornea, pupil and fundus.  Thyroid not palpable, not enlarged, no nodules detected.  CHEST:chest clear to IPPA, no tachypnea, retractions or cyanosis and S1, S2 normal, no murmur, no gallop, rate regular.  Shoulder exam shows positive impingement signs are present with pain at high arc of abduction and forward flexion on left. There is tenderness of the left shoulder. Weakness with  abduction and forward flexion at greater than 75 degrees.    Copoer was seen today for shoulder pain.    Diagnoses and all orders for this visit:    Shoulder injury, left, initial encounter  -     XR Shoulder Left 2 Views; Future  -     MR Shoulder Left w/o Contrast; Future    Essential hypertension with goal blood pressure less than 140/90  -     losartan (COZAAR) 100 MG tablet; Take 1 tablet (100 mg) by mouth daily    Type 2 diabetes mellitus with microalbuminuria, without long-term current use of insulin (H)  -     losartan (COZAAR) 100 MG tablet; Take 1 tablet (100 mg) by mouth daily    COPD exacerbation (H)  -     COPD ACTION PLAN      Advised supportive and symptomatic treatment.  Follow up with Provider - if condition persists or worsens.

## 2019-01-28 ENCOUNTER — ANCILLARY PROCEDURE (OUTPATIENT)
Dept: MRI IMAGING | Facility: CLINIC | Age: 64
End: 2019-01-28
Payer: COMMERCIAL

## 2019-01-28 DIAGNOSIS — S49.92XA SHOULDER INJURY, LEFT, INITIAL ENCOUNTER: ICD-10-CM

## 2019-01-28 PROCEDURE — 73221 MRI JOINT UPR EXTREM W/O DYE: CPT | Mod: TC

## 2019-02-12 ENCOUNTER — OFFICE VISIT (OUTPATIENT)
Dept: FAMILY MEDICINE | Facility: CLINIC | Age: 64
End: 2019-02-12
Payer: COMMERCIAL

## 2019-02-12 VITALS
OXYGEN SATURATION: 94 % | BODY MASS INDEX: 42.8 KG/M2 | RESPIRATION RATE: 16 BRPM | TEMPERATURE: 99.5 F | HEART RATE: 94 BPM | SYSTOLIC BLOOD PRESSURE: 126 MMHG | DIASTOLIC BLOOD PRESSURE: 74 MMHG | HEIGHT: 69 IN | WEIGHT: 289 LBS

## 2019-02-12 DIAGNOSIS — J01.00 ACUTE NON-RECURRENT MAXILLARY SINUSITIS: Primary | ICD-10-CM

## 2019-02-12 DIAGNOSIS — M75.92 LEFT SUPRASPINATUS TENDINITIS: ICD-10-CM

## 2019-02-12 PROCEDURE — 20610 DRAIN/INJ JOINT/BURSA W/O US: CPT | Mod: LT | Performed by: PHYSICIAN ASSISTANT

## 2019-02-12 PROCEDURE — 99213 OFFICE O/P EST LOW 20 MIN: CPT | Mod: 25 | Performed by: PHYSICIAN ASSISTANT

## 2019-02-12 RX ORDER — PREDNISONE 10 MG/1
10 TABLET ORAL 2 TIMES DAILY
Qty: 10 TABLET | Refills: 0 | Status: SHIPPED | OUTPATIENT
Start: 2019-02-12 | End: 2019-05-17

## 2019-02-12 RX ORDER — METHYLPREDNISOLONE ACETATE 40 MG/ML
40 INJECTION, SUSPENSION INTRA-ARTICULAR; INTRALESIONAL; INTRAMUSCULAR; SOFT TISSUE ONCE
Status: DISCONTINUED | OUTPATIENT
Start: 2019-02-12 | End: 2019-05-17

## 2019-02-12 RX ORDER — DOXYCYCLINE HYCLATE 100 MG
100 TABLET ORAL 2 TIMES DAILY
Qty: 20 TABLET | Refills: 0 | Status: SHIPPED | OUTPATIENT
Start: 2019-02-12 | End: 2019-05-17

## 2019-02-12 ASSESSMENT — MIFFLIN-ST. JEOR: SCORE: 2106.28

## 2019-02-12 NOTE — PROGRESS NOTES
SUBJECTIVE:   Cooper Vargas is a 63 year old male who presents to clinic today for the following health issues:      Acute Illness   Acute illness concerns: sinus congestion  Onset: 6 days    Fever: no    Chills/Sweats: YES    Headache (location?): YES    Sinus Pressure:YES    Conjunctivitis:  no    Ear Pain: YES: bilateral    Rhinorrhea: YES    Congestion: YES    Sore Throat: YES     Cough: YES-productive of yellow sputum    Wheeze: YES    Decreased Appetite: YES    Nausea: no    Vomiting: no    Diarrhea:  no    Dysuria/Freq.: no    Fatigue/Achiness: YES    Sick/Strep Exposure: no     Therapies Tried and outcome: inhaler with no relief    Joint Pain    Onset: chronic     Description:   Location: left shoulder  Character: Sharp and Dull ache    Intensity: moderate    Progression of Symptoms: intermittent    Accompanying Signs & Symptoms:  Other symptoms: none    History:   Previous similar pain: YES      Precipitating factors:   Trauma or overuse: no     Alleviating factors:  Improved by: cortisone injection     Therapies Tried and outcome: cortisone injection    Left shoulder supraspinatus tendinitis.  Patient here for and injection.       Problem list and histories reviewed & adjusted, as indicated.  Additional history: as documented    BP Readings from Last 3 Encounters:   02/12/19 126/74   01/23/19 113/76   10/10/18 130/84    Wt Readings from Last 3 Encounters:   02/12/19 131.1 kg (289 lb)   01/23/19 129.3 kg (285 lb)   10/10/18 126.6 kg (279 lb)                    Reviewed and updated as needed this visit by clinical staff  Tobacco  Allergies  Meds       Reviewed and updated as needed this visit by Provider         All other systems negative except as outline above  OBJECTIVE:  ENT exam reveals - bilateral TM fluid noted, neck without nodes, throat normal without erythema or exudate, maxillary sinus tender, post nasal drip noted, nasal mucosa congested and nasal mucosa pale and congested.  CHEST:chest  clear to IPPA, no tachypnea, retractions or cyanosis and S1, S2 normal, no murmur, no gallop, rate regular.       The risks, benefits and potential complications (including but not limited to, bleeding, infection, pain, scar, damage to adjacent structures, atrophy or necrosis of soft tissue, skin blanching, failure to relieve symptoms) of injection were discussed with the patient. Questions were addressed and answered.The patient elected to proceed. Written informed consent was obtained. The correct procedural site was identified and confirmed. A Left shoulder intraarticular injection was performed using 1mL Depo Medrol 40mg per mL and 2mL (0.25% marcaine) of local anesthetic after sterile prep, to the correct procedural site. Sterile bandaid applied. This was tolerated well by the patient. No apparent complications.Did also discuss that if diabetic, recommend close monitoring of blood sugars over the next week as cortisone injections can temporarily elevate blood sugars.    Cooper was seen today for musculoskeletal problem and sinus problem.    Diagnoses and all orders for this visit:    Acute non-recurrent maxillary sinusitis  -     doxycycline hyclate (VIBRA-TABS) 100 MG tablet; Take 1 tablet (100 mg) by mouth 2 times daily for 10 days  -     predniSONE (DELTASONE) 10 MG tablet; Take 10 mg by mouth 2 times daily for 5 days.    Left supraspinatus tendinitis  -     methylPREDNISolone (DEPO-MEDROL) injection 40 mg; Inject 1 mL (40 mg) into the muscle once  -     DRAIN/INJECT LARGE JOINT/BURSA      Advised supportive and symptomatic treatment.  Follow up with Provider - if condition persists or worsens.

## 2019-05-13 ENCOUNTER — DOCUMENTATION ONLY (OUTPATIENT)
Dept: FAMILY MEDICINE | Facility: CLINIC | Age: 64
End: 2019-05-13

## 2019-05-13 DIAGNOSIS — E78.5 HYPERLIPIDEMIA LDL GOAL <100: ICD-10-CM

## 2019-05-13 DIAGNOSIS — R80.9 TYPE 2 DIABETES MELLITUS WITH MICROALBUMINURIA, WITHOUT LONG-TERM CURRENT USE OF INSULIN (H): Primary | ICD-10-CM

## 2019-05-13 DIAGNOSIS — E11.29 TYPE 2 DIABETES MELLITUS WITH MICROALBUMINURIA, WITHOUT LONG-TERM CURRENT USE OF INSULIN (H): Primary | ICD-10-CM

## 2019-05-15 DIAGNOSIS — E11.29 TYPE 2 DIABETES MELLITUS WITH MICROALBUMINURIA, WITHOUT LONG-TERM CURRENT USE OF INSULIN (H): ICD-10-CM

## 2019-05-15 DIAGNOSIS — E78.5 HYPERLIPIDEMIA LDL GOAL <100: ICD-10-CM

## 2019-05-15 DIAGNOSIS — R80.9 TYPE 2 DIABETES MELLITUS WITH MICROALBUMINURIA, WITHOUT LONG-TERM CURRENT USE OF INSULIN (H): ICD-10-CM

## 2019-05-15 LAB
ANION GAP SERPL CALCULATED.3IONS-SCNC: 11 MMOL/L (ref 3–14)
BUN SERPL-MCNC: 12 MG/DL (ref 7–30)
CALCIUM SERPL-MCNC: 9.1 MG/DL (ref 8.5–10.1)
CHLORIDE SERPL-SCNC: 105 MMOL/L (ref 94–109)
CHOLEST SERPL-MCNC: 230 MG/DL
CO2 SERPL-SCNC: 23 MMOL/L (ref 20–32)
CREAT SERPL-MCNC: 0.95 MG/DL (ref 0.66–1.25)
CREAT UR-MCNC: 224 MG/DL
GFR SERPL CREATININE-BSD FRML MDRD: 85 ML/MIN/{1.73_M2}
GLUCOSE SERPL-MCNC: 121 MG/DL (ref 70–99)
HBA1C MFR BLD: 6.4 % (ref 0–5.6)
HDLC SERPL-MCNC: 50 MG/DL
LDLC SERPL CALC-MCNC: 127 MG/DL
MICROALBUMIN UR-MCNC: 101 MG/L
MICROALBUMIN/CREAT UR: 45.09 MG/G CR (ref 0–17)
NONHDLC SERPL-MCNC: 180 MG/DL
POTASSIUM SERPL-SCNC: 4.2 MMOL/L (ref 3.4–5.3)
SODIUM SERPL-SCNC: 139 MMOL/L (ref 133–144)
TRIGL SERPL-MCNC: 264 MG/DL

## 2019-05-15 PROCEDURE — 80061 LIPID PANEL: CPT | Performed by: PHYSICIAN ASSISTANT

## 2019-05-15 PROCEDURE — 82043 UR ALBUMIN QUANTITATIVE: CPT | Performed by: PHYSICIAN ASSISTANT

## 2019-05-15 PROCEDURE — 80048 BASIC METABOLIC PNL TOTAL CA: CPT | Performed by: PHYSICIAN ASSISTANT

## 2019-05-15 PROCEDURE — 83036 HEMOGLOBIN GLYCOSYLATED A1C: CPT | Performed by: PHYSICIAN ASSISTANT

## 2019-05-15 PROCEDURE — 36415 COLL VENOUS BLD VENIPUNCTURE: CPT | Performed by: PHYSICIAN ASSISTANT

## 2019-05-17 RX ORDER — METFORMIN HCL 500 MG
TABLET, EXTENDED RELEASE 24 HR ORAL
Qty: 180 TABLET | Refills: 1 | Status: CANCELLED | OUTPATIENT
Start: 2019-05-17

## 2019-05-17 NOTE — PROGRESS NOTES
SUBJECTIVE:   CC: Cooper Vargas is an 63 year old male who presents for preventive health visit.     Healthy Habits:    Do you get at least three servings of calcium containing foods daily (dairy, green leafy vegetables, etc.)? yes    Amount of exercise or daily activities, outside of work: 0 day(s) per week    Problems taking medications regularly No    Medication side effects: No    Have you had an eye exam in the past two years? no    Do you see a dentist twice per year? yes    Do you have sleep apnea, excessive snoring or daytime drowsiness?both  Cold symptoms with bronchitis.     Diabetes Follow-up---labs done 05/2019      Patient is checking blood sugars: not at all    Diabetic concerns: None     Symptoms of hypoglycemia (low blood sugar): none     Paresthesias (numbness or burning in feet) or sores: Yes      Date of last diabetic eye exam: 2018    Diabetes Management Resources    Hyperlipidemia Follow-Up      Rate your low fat/cholesterol diet?: not monitoring fat    Taking statin?  No    Other lipid medications/supplements?:  none  Intolerant to statins.    Hypertension Follow-up      Outpatient blood pressures are not being checked.    Low Salt Diet: not monitoring salt    BP Readings from Last 2 Encounters:   05/21/19 122/78   02/12/19 126/74     Hemoglobin A1C (%)   Date Value   05/15/2019 6.4 (H)   10/10/2018 6.5 (H)     LDL Cholesterol Calculated (mg/dL)   Date Value   05/15/2019 127 (H)   11/30/2017 154 (H)       Today's PHQ-2 Score:   PHQ-2 ( 1999 [employer]) 2/12/2019 1/23/2019   Q1: Little interest or pleasure in doing things 0 0   Q2: Feeling down, depressed or hopeless 0 0   PHQ-2 Score 0 0       Abuse: Current or Past(Physical, Sexual or Emotional)- No  Do you feel safe in your environment? Yes    Social History     Tobacco Use     Smoking status: Former Smoker     Packs/day: 1.00     Years: 42.00     Pack years: 42.00     Types: Cigarettes     Last attempt to quit: 9/29/2015     Years since  "quitting: 3.6     Smokeless tobacco: Never Used   Substance Use Topics     Alcohol use: Yes     Alcohol/week: 0.0 oz     Comment: social drinks on the weekends.12 drinks (imelda)weekly     If you drink alcohol do you typically have >3 drinks per day or >7 drinks per week? No                      Last PSA:   PSA   Date Value Ref Range Status   10/22/2013 0.72 0 - 4 ug/L Final       Reviewed orders with patient. Reviewed health maintenance and updated orders accordingly - Yes      Reviewed and updated as needed this visit by clinical staff  Tobacco  Allergies  Meds         Reviewed and updated as needed this visit by Provider            ROS:  CONSTITUTIONAL: NEGATIVE for fever, chills, change in weight  INTEGUMENTARY/SKIN: NEGATIVE for worrisome rashes, moles or lesions  EYES: NEGATIVE for vision changes or irritation  ENT: NEGATIVE for ear, mouth and throat problems  RESP: NEGATIVE for significant cough or SOB  CV: NEGATIVE for chest pain, palpitations or peripheral edema  GI: NEGATIVE for nausea, abdominal pain, heartburn, or change in bowel habits   male: negative for dysuria, hematuria, decreased urinary stream, erectile dysfunction, urethral discharge  MUSCULOSKELETAL: NEGATIVE for significant arthralgias or myalgia  NEURO: NEGATIVE for weakness, dizziness or paresthesias  PSYCHIATRIC: NEGATIVE for changes in mood or affect    OBJECTIVE:   /78   Pulse 83   Temp 98.2  F (36.8  C) (Tympanic)   Resp 16   Ht 1.753 m (5' 9\")   Wt 129.3 kg (285 lb)   SpO2 93%   BMI 42.09 kg/m    EXAM:  GENERAL: healthy, alert and no distress  EYES: Eyes grossly normal to inspection, PERRL and conjunctivae and sclerae normal  HENT: ear canals and TM's normal, nose and mouth without ulcers or lesions  NECK: no adenopathy, no asymmetry, masses, or scars and thyroid normal to palpation  RESP: expiratory wheezes throughout and inspiratory wheezes throughout  CV: regular rate and rhythm, normal S1 S2, no S3 or S4, no " "murmur, click or rub, no peripheral edema and peripheral pulses strong  MS: no gross musculoskeletal defects noted, no edema  SKIN: no suspicious lesions or rashes  NEURO: Normal strength and tone, mentation intact and speech normal  PSYCH: mentation appears normal, affect normal/bright  Diabetic foot exam: normal DP and PT pulses, no trophic changes or ulcerative lesions, normal sensory exam and normal monofilament exam        ASSESSMENT/PLAN:       ICD-10-CM    1. Routine general medical examination at a health care facility Z00.00    2. Type 2 diabetes mellitus with microalbuminuria, without long-term current use of insulin (H) E11.29 losartan (COZAAR) 100 MG tablet    R80.9 FOOT EXAM     metFORMIN (GLUCOPHAGE-XR) 500 MG 24 hr tablet   3. Essential hypertension with goal blood pressure less than 140/90 I10 losartan (COZAAR) 100 MG tablet     amLODIPine (NORVASC) 10 MG tablet   4. COPD exacerbation (H) J44.1 albuterol (PROAIR HFA/PROVENTIL HFA/VENTOLIN HFA) 108 (90 Base) MCG/ACT inhaler     umeclidinium-vilanterol (ANORO ELLIPTA) 62.5-25 MCG/INH oral inhaler     doxycycline hyclate (VIBRA-TABS) 100 MG tablet     predniSONE (DELTASONE) 20 MG tablet   5. Morbid obesity (H) E66.01        COUNSELING:  Reviewed preventive health counseling, as reflected in patient instructions       Regular exercise       Healthy diet/nutrition    Estimated body mass index is 42.09 kg/m  as calculated from the following:    Height as of this encounter: 1.753 m (5' 9\").    Weight as of this encounter: 129.3 kg (285 lb).    Weight management plan: Discussed healthy diet and exercise guidelines     reports that he quit smoking about 3 years ago. His smoking use included cigarettes. He has a 42.00 pack-year smoking history. He has never used smokeless tobacco.      Counseling Resources:  ATP IV Guidelines  Pooled Cohorts Equation Calculator  FRAX Risk Assessment  ICSI Preventive Guidelines  Dietary Guidelines for Americans, 2010  USDA's " MyPlate  ASA Prophylaxis  Lung CA Screening    Max Orantes PA-C  [facility]

## 2019-05-21 ENCOUNTER — TELEPHONE (OUTPATIENT)
Dept: FAMILY MEDICINE | Facility: CLINIC | Age: 64
End: 2019-05-21

## 2019-05-21 ENCOUNTER — OFFICE VISIT (OUTPATIENT)
Dept: FAMILY MEDICINE | Facility: CLINIC | Age: 64
End: 2019-05-21
Payer: COMMERCIAL

## 2019-05-21 VITALS
TEMPERATURE: 98.2 F | RESPIRATION RATE: 16 BRPM | HEIGHT: 69 IN | OXYGEN SATURATION: 93 % | BODY MASS INDEX: 42.21 KG/M2 | WEIGHT: 285 LBS | DIASTOLIC BLOOD PRESSURE: 78 MMHG | SYSTOLIC BLOOD PRESSURE: 122 MMHG | HEART RATE: 83 BPM

## 2019-05-21 DIAGNOSIS — J44.1 COPD EXACERBATION (H): ICD-10-CM

## 2019-05-21 DIAGNOSIS — R80.9 TYPE 2 DIABETES MELLITUS WITH MICROALBUMINURIA, WITHOUT LONG-TERM CURRENT USE OF INSULIN (H): ICD-10-CM

## 2019-05-21 DIAGNOSIS — Z00.00 ROUTINE GENERAL MEDICAL EXAMINATION AT A HEALTH CARE FACILITY: Primary | ICD-10-CM

## 2019-05-21 DIAGNOSIS — I10 ESSENTIAL HYPERTENSION WITH GOAL BLOOD PRESSURE LESS THAN 140/90: ICD-10-CM

## 2019-05-21 DIAGNOSIS — E11.29 TYPE 2 DIABETES MELLITUS WITH MICROALBUMINURIA, WITHOUT LONG-TERM CURRENT USE OF INSULIN (H): ICD-10-CM

## 2019-05-21 DIAGNOSIS — E66.01 MORBID OBESITY (H): ICD-10-CM

## 2019-05-21 PROCEDURE — 99396 PREV VISIT EST AGE 40-64: CPT | Performed by: PHYSICIAN ASSISTANT

## 2019-05-21 PROCEDURE — 99207 C FOOT EXAM  NO CHARGE: CPT | Mod: 25 | Performed by: PHYSICIAN ASSISTANT

## 2019-05-21 PROCEDURE — 99213 OFFICE O/P EST LOW 20 MIN: CPT | Mod: 25 | Performed by: PHYSICIAN ASSISTANT

## 2019-05-21 RX ORDER — LOSARTAN POTASSIUM 100 MG/1
100 TABLET ORAL DAILY
Qty: 90 TABLET | Refills: 1 | Status: SHIPPED | OUTPATIENT
Start: 2019-05-21 | End: 2020-03-13

## 2019-05-21 RX ORDER — AMLODIPINE BESYLATE 10 MG/1
10 TABLET ORAL DAILY
Qty: 90 TABLET | Refills: 1 | Status: SHIPPED | OUTPATIENT
Start: 2019-05-21 | End: 2020-05-07

## 2019-05-21 RX ORDER — PREDNISONE 20 MG/1
20 TABLET ORAL 2 TIMES DAILY
Qty: 14 TABLET | Refills: 0 | Status: SHIPPED | OUTPATIENT
Start: 2019-05-21 | End: 2020-12-29

## 2019-05-21 RX ORDER — METFORMIN HCL 500 MG
TABLET, EXTENDED RELEASE 24 HR ORAL
Qty: 180 TABLET | Refills: 1 | Status: SHIPPED | OUTPATIENT
Start: 2019-05-21 | End: 2020-06-10

## 2019-05-21 RX ORDER — DOXYCYCLINE HYCLATE 100 MG
100 TABLET ORAL 2 TIMES DAILY
Qty: 28 TABLET | Refills: 0 | Status: SHIPPED | OUTPATIENT
Start: 2019-05-21 | End: 2020-12-29

## 2019-05-21 RX ORDER — ALBUTEROL SULFATE 90 UG/1
2 AEROSOL, METERED RESPIRATORY (INHALATION) EVERY 6 HOURS PRN
Qty: 8.5 G | Refills: 11 | Status: SHIPPED | OUTPATIENT
Start: 2019-05-21 | End: 2020-08-31

## 2019-05-21 ASSESSMENT — MIFFLIN-ST. JEOR: SCORE: 2088.13

## 2019-05-21 NOTE — TELEPHONE ENCOUNTER
Patient calling was seen in clinic this am, forgot to ask for a written copy of his lab test rests. Would like a copy mailed to his home address please. Thank you

## 2019-08-22 DIAGNOSIS — I10 ESSENTIAL HYPERTENSION WITH GOAL BLOOD PRESSURE LESS THAN 140/90: ICD-10-CM

## 2019-08-22 NOTE — TELEPHONE ENCOUNTER
"Requested Prescriptions   Pending Prescriptions Disp Refills     amLODIPine (NORVASC) 10 MG tablet [Pharmacy Med Name: AMLODIPINE BESYLATE 10MG TABS]  Last Written Prescription Date:  02/08/19  Last Fill Quantity: 90,  # refills: 1   Last office visit: 5/21/2019 with prescribing provider:  SOHAN Orantes   Future Office Visit:     90 tablet 1     Sig: TAKE ONE TABLET BY MOUTH ONCE DAILY       Calcium Channel Blockers Protocol  Passed - 8/22/2019  1:17 PM        Passed - Blood pressure under 140/90 in past 12 months     BP Readings from Last 3 Encounters:   05/21/19 122/78   02/12/19 126/74   01/23/19 113/76                 Passed - Recent (12 mo) or future (30 days) visit within the authorizing provider's specialty     Patient had office visit in the last 12 months or has a visit in the next 30 days with authorizing provider or within the authorizing provider's specialty.  See \"Patient Info\" tab in inbasket, or \"Choose Columns\" in Meds & Orders section of the refill encounter.              Passed - Medication is active on med list        Passed - Patient is age 18 or older        Passed - Normal serum creatinine on file in past 12 months     Recent Labs   Lab Test 05/15/19  0931   CR 0.95               "

## 2019-08-23 RX ORDER — AMLODIPINE BESYLATE 10 MG/1
TABLET ORAL
Qty: 90 TABLET | Refills: 1 | Status: SHIPPED | OUTPATIENT
Start: 2019-08-23 | End: 2020-05-06

## 2019-08-23 NOTE — TELEPHONE ENCOUNTER
Prescription approved per [facility] Refill Protocol.  Patient due for recheck in November 2019.    Kiesha Salcido RN, BSN, PHN

## 2020-03-12 DIAGNOSIS — R80.9 TYPE 2 DIABETES MELLITUS WITH MICROALBUMINURIA, WITHOUT LONG-TERM CURRENT USE OF INSULIN (H): ICD-10-CM

## 2020-03-12 DIAGNOSIS — I10 ESSENTIAL HYPERTENSION WITH GOAL BLOOD PRESSURE LESS THAN 140/90: ICD-10-CM

## 2020-03-12 DIAGNOSIS — E11.29 TYPE 2 DIABETES MELLITUS WITH MICROALBUMINURIA, WITHOUT LONG-TERM CURRENT USE OF INSULIN (H): ICD-10-CM

## 2020-03-12 NOTE — TELEPHONE ENCOUNTER
"Requested Prescriptions   Pending Prescriptions Disp Refills     losartan (COZAAR) 100 MG tablet [Pharmacy Med Name: LOSARTAN POTASSIUM 100MG TABS] 90 tablet 1     Sig: TAKE ONE TABLET BY MOUTH ONCE DAILY   Last Written Prescription Date:  11/18/19  Last Fill Quantity: 90,  # refills: 1   Last office visit: 5/21/2019 with prescribing provider:  SOHAN Orantes   Future Office Visit:        Angiotensin-II Receptors Passed - 3/12/2020  8:40 AM        Passed - Last blood pressure under 140/90 in past 12 months     BP Readings from Last 3 Encounters:   05/21/19 122/78   02/12/19 126/74   01/23/19 113/76                 Passed - Recent (12 mo) or future (30 days) visit within the authorizing provider's specialty     Patient has had an office visit with the authorizing provider or a provider within the authorizing providers department within the previous 12 mos or has a future within next 30 days. See \"Patient Info\" tab in inbasket, or \"Choose Columns\" in Meds & Orders section of the refill encounter.              Passed - Medication is active on med list        Passed - Patient is age 18 or older        Passed - Normal serum creatinine on file in past 12 months     Recent Labs   Lab Test 05/15/19  0931   CR 0.95             Passed - Normal serum potassium on file in past 12 months     Recent Labs   Lab Test 05/15/19  0931   POTASSIUM 4.2                         "

## 2020-03-13 RX ORDER — LOSARTAN POTASSIUM 100 MG/1
TABLET ORAL
Qty: 30 TABLET | Refills: 0 | Status: SHIPPED | OUTPATIENT
Start: 2020-03-13 | End: 2020-04-10

## 2020-04-09 DIAGNOSIS — R80.9 TYPE 2 DIABETES MELLITUS WITH MICROALBUMINURIA, WITHOUT LONG-TERM CURRENT USE OF INSULIN (H): ICD-10-CM

## 2020-04-09 DIAGNOSIS — I10 ESSENTIAL HYPERTENSION WITH GOAL BLOOD PRESSURE LESS THAN 140/90: ICD-10-CM

## 2020-04-09 DIAGNOSIS — E11.29 TYPE 2 DIABETES MELLITUS WITH MICROALBUMINURIA, WITHOUT LONG-TERM CURRENT USE OF INSULIN (H): ICD-10-CM

## 2020-04-09 NOTE — TELEPHONE ENCOUNTER
"Requested Prescriptions   Pending Prescriptions Disp Refills     losartan (COZAAR) 100 MG tablet [Pharmacy Med Name: LOSARTAN POTASSIUM 100MG TABS] 30 tablet 0     Sig: TAKE ONE TABLET BY MOUTH ONCE DAILY **OVERDUE FOR BLOOD PRESSURE CHECK - CALL [phone] TO SCHEDULE APPT**   Last Written Prescription Date:  03/13/20  Last Fill Quantity: 30,  # refills: 0   Last office visit: 5/21/2019 with prescribing provider:  SOHAN Orantes   Future Office Visit:        Angiotensin-II Receptors Passed - 4/9/2020  5:00 AM        Passed - Last blood pressure under 140/90 in past 12 months     BP Readings from Last 3 Encounters:   05/21/19 122/78   02/12/19 126/74   01/23/19 113/76                 Passed - Recent (12 mo) or future (30 days) visit within the authorizing provider's specialty     Patient has had an office visit with the authorizing provider or a provider within the authorizing providers department within the previous 12 mos or has a future within next 30 days. See \"Patient Info\" tab in inbasket, or \"Choose Columns\" in Meds & Orders section of the refill encounter.              Passed - Medication is active on med list        Passed - Patient is age 18 or older        Passed - Normal serum creatinine on file in past 12 months     Recent Labs   Lab Test 05/15/19  0931   CR 0.95       Ok to refill medication if creatinine is low          Passed - Normal serum potassium on file in past 12 months     Recent Labs   Lab Test 05/15/19  0931   POTASSIUM 4.2                           "

## 2020-04-10 RX ORDER — LOSARTAN POTASSIUM 100 MG/1
TABLET ORAL
Qty: 30 TABLET | Refills: 0 | Status: SHIPPED | OUTPATIENT
Start: 2020-04-10 | End: 2020-05-06

## 2020-05-04 DIAGNOSIS — I10 ESSENTIAL HYPERTENSION WITH GOAL BLOOD PRESSURE LESS THAN 140/90: ICD-10-CM

## 2020-05-04 DIAGNOSIS — E11.29 TYPE 2 DIABETES MELLITUS WITH MICROALBUMINURIA, WITHOUT LONG-TERM CURRENT USE OF INSULIN (H): ICD-10-CM

## 2020-05-04 DIAGNOSIS — R80.9 TYPE 2 DIABETES MELLITUS WITH MICROALBUMINURIA, WITHOUT LONG-TERM CURRENT USE OF INSULIN (H): ICD-10-CM

## 2020-05-05 NOTE — TELEPHONE ENCOUNTER
Routing refill request to provider for review/approval because:  Patient needs to be seen because:  Needs BP check.   BP Readings from Last 3 Encounters:   05/21/19 122/78   02/12/19 126/74   01/23/19 113/76   pended with second reminder

## 2020-05-06 RX ORDER — LOSARTAN POTASSIUM 100 MG/1
TABLET ORAL
Qty: 30 TABLET | Refills: 0 | Status: SHIPPED | OUTPATIENT
Start: 2020-05-06 | End: 2020-05-07

## 2020-05-06 NOTE — TELEPHONE ENCOUNTER
dotty is due for a visit with me. Schedule him for a virtual (video or phone based on patient preference. Always promote a video visit first) visit with me.

## 2020-05-06 NOTE — PROGRESS NOTES
"Cooper Vargas is a 64 year old male who is being evaluated via a billable telephone visit.      The patient has been notified of following:     \"This telephone visit will be conducted via a call between you and your physician/provider. We have found that certain health care needs can be provided without the need for a physical exam.  This service lets us provide the care you need with a short phone conversation.  If a prescription is necessary we can send it directly to your pharmacy.  If lab work is needed we can place an order for that and you can then stop by our lab to have the test done at a later time.    Telephone visits are billed at different rates depending on your insurance coverage. During this emergency period, for some insurers they may be billed the same as an in-person visit.  Please reach out to your insurance provider with any questions.    If during the course of the call the physician/provider feels a telephone visit is not appropriate, you will not be charged for this service.\"    Patient has given verbal consent for Telephone visit?  Yes    What phone number would you like to be contacted at? [phone]    How would you like to obtain your AVS? Mail a copy    Subjective     Cooper Vargas is a 64 year old male who presents to clinic today for the following health issues:    HPI  Hypertension Follow-up      Do you check your blood pressure regularly outside of the clinic? Yes - running about 130/86, overall , adequately controlled.     Are you following a low salt diet? No    Are your blood pressures ever more than 140 on the top number (systolic) OR more   than 90 on the bottom number (diastolic), for example 140/90? Yes- once in the last 3 weeks      How many servings of fruits and vegetables do you eat daily?  2-3    On average, how many sweetened beverages do you drink each day (Examples: soda, juice, sweet tea, etc.  Do NOT count diet or artificially sweetened beverages)?   1    How " many days per week do you exercise enough to make your heart beat faster? 3 or less    How many minutes a day do you exercise enough to make your heart beat faster? 10 - 19    How many days per week do you miss taking your medication? 0     No chest pain/sob/palps. No ha's. Rare dizziness (with turning head to left).    Recheck of his diabetes:  Not checking sugars, nor is he taking his metformin. I instructed him to start taking 2 tabs every morning with breakfast.  No polyuria/dipsia. No profound fatigue.  Noting some weight gain. Not specifically watching his diet. Instructed him on portion control and a lower sugar/fat diet.   No neuropathy symptoms or vision changes.  Not real active either. Strongly urged him to start walking consistently.      Recheck of his copd:  Breathing well overall.   No profound wheezing. Occasional cough. Some pnd related to allergies.   Not using his anoro. Breathing is doing well. I instructed him that with his copd he should be using his anoro daily. He will consider this.    Patient Active Problem List   Diagnosis     GERD (gastroesophageal reflux disease)     Plantar fasciitis     Fatigue     Helicobacter pylori infection     Lyme disease     Smoking     Obesity     Tremors, hands     Hyperlipidemia LDL goal <100     LISETTE (obstructive sleep apnea)-Moderate (AHI 21)     Advanced directives, counseling/discussion     Acute gouty arthritis     Bronchitis, mucopurulent recurrent (H)     Mixed simple and mucopurulent chronic bronchitis (H)     Essential hypertension with goal blood pressure less than 140/90     Type 2 diabetes mellitus with microalbuminuria, without long-term current use of insulin (H)     Morbid obesity (H)     COPD exacerbation (H)     Past Surgical History:   Procedure Laterality Date     KNEE SURGERY       ORTHOPEDIC SURGERY  2009    Right knee     ORTHOPEDIC SURGERY  1970's    Right ankle     rib surgery  1982    Top right side rib removed       Social History      Tobacco Use     Smoking status: Former Smoker     Packs/day: 1.00     Years: 42.00     Pack years: 42.00     Types: Cigarettes     Last attempt to quit: 2015     Years since quittin.6     Smokeless tobacco: Never Used   Substance Use Topics     Alcohol use: Yes     Alcohol/week: 0.0 standard drinks     Comment: social drinks on the weekends.12 drinks (imelda)weekly     Family History   Problem Relation Age of Onset     Diabetes Paternal Grandmother      Alzheimer Disease Paternal Grandmother      Neurologic Disorder Sister         migraines     Neurologic Disorder Son         Shaking of hands. Age 30 years         Current Outpatient Medications   Medication Sig Dispense Refill     amLODIPine (NORVASC) 10 MG tablet TAKE ONE TABLET BY MOUTH ONCE DAILY 90 tablet 1     aspirin (ASA) 81 MG EC tablet Take 1 tablet (81 mg) by mouth daily 90 tablet 3     losartan (COZAAR) 100 MG tablet TAKE ONE TABLET BY MOUTH ONCE DAILY 30 tablet 0     albuterol (PROAIR HFA/PROVENTIL HFA/VENTOLIN HFA) 108 (90 Base) MCG/ACT inhaler Inhale 2 puffs into the lungs every 6 hours as needed for shortness of breath / dyspnea or wheezing (Patient not taking: Reported on 2020) 8.5 g 11     amLODIPine (NORVASC) 10 MG tablet Take 1 tablet (10 mg) by mouth daily 90 tablet 1     metFORMIN (GLUCOPHAGE-XR) 500 MG 24 hr tablet TAKE ONE TABLET BY MOUTH EVERY EVENING FOR ONE WEEK, THEN INCREASE TO TWO TABLETS EVERY EVENING THEREAFTER. (Patient not taking: Reported on 2020) 180 tablet 1     order for DME Equipment being ordered: CPAP equipment all supplies especially hoses and nose guards. 1 each 1     STATIN NOT PRESCRIBED, INTENTIONAL, 1 each daily Please choose reason not prescribed, below       umeclidinium-vilanterol (ANORO ELLIPTA) 62.5-25 MCG/INH oral inhaler Inhale 1 puff into the lungs daily 1 Inhaler 11     Allergies   Allergen Reactions     Penicillins      Recent Labs   Lab Test 05/15/19  0931 10/10/18  1211 18  1157  11/30/17  0850  01/16/17  0948 07/19/16  0912 06/06/16  0809 03/21/16  1620   A1C 6.4* 6.5* 6.9*  --    < > 6.6* 6.4*  --  6.4*   *  --   --  154*  --  Cannot estimate LDL when triglyceride exceeds 400 mg/dL   Desirable:       <100 mg/dl  CORRECTED ON 01/16 AT 1941: PREVIOUSLY REPORTED AS Cannot estimate LDL when   triglyceride exceeds 400 mg/dL    147*  --   --   --    HDL 50  --   --  57  --  51  --   --   --    TRIG 264*  --   --  195*  --  608*  --   --   --    ALT  --   --   --   --   --   --  75* 74* 133*   CR 0.95 0.98  --   --    < > 0.93 0.92 0.82 0.88   GFRESTIMATED 85 78  --   --    < > 83 84 >90  Non  GFR Calc   89   GFRESTBLACK >90 >90  --   --    < > >90  CORRECTED ON 01/16 AT 1941: PREVIOUSLY REPORTED AS >90  GFR Calc   >90   GFR Calc   >90   GFR Calc   >90   GFR Calc     POTASSIUM 4.2 4.6  --   --    < > 4.1 4.4 4.2 4.2   TSH  --   --  1.96  --   --   --   --   --  2.54    < > = values in this interval not displayed.      BP Readings from Last 3 Encounters:   05/21/19 122/78   02/12/19 126/74   01/23/19 113/76    Wt Readings from Last 3 Encounters:   05/21/19 129.3 kg (285 lb)   02/12/19 131.1 kg (289 lb)   01/23/19 129.3 kg (285 lb)                    Reviewed and updated as needed this visit by Provider         Review of Systems   ROS COMP: Constitutional, HEENT, cardiovascular, pulmonary, GI, , musculoskeletal, neuro, skin, endocrine and psych systems are negative, except as otherwise noted.       Objective   Reported vitals:  There were no vitals taken for this visit.   healthy, alert and no distress  PSYCH: Alert and oriented times 3; coherent speech, normal   rate and volume, able to articulate logical thoughts, able   to abstract reason, no tangential thoughts, no hallucinations   or delusions  His affect is normal  RESP: No cough, no audible wheezing, able to talk in full sentences  Remainder of exam  unable to be completed due to telephone visits    Diagnostic Test Results:  Labs reviewed in Epic        Assessment/Plan:  1. Essential hypertension with goal blood pressure less than 140/90  work on lifestyle modification  Continue meds. Condition stable  - amLODIPine (NORVASC) 10 MG tablet; Take 1 tablet (10 mg) by mouth daily  Dispense: 90 tablet; Refill: 0  - losartan (COZAAR) 100 MG tablet; Take 1 tablet (100 mg) by mouth daily  Dispense: 90 tablet; Refill: 0    2. Type 2 diabetes mellitus with microalbuminuria, without long-term current use of insulin (H)  Uncertain of control but a1c last year was 6.4%.  - losartan (COZAAR) 100 MG tablet; Take 1 tablet (100 mg) by mouth daily  Dispense: 90 tablet; Refill: 0    3. COPD exacerbation (H)  Stable. Start using anoro consistently.    4. Morbid obesity (H)  Needs to really work on lifestyle changes to loose weight.      Recheck in 3 mos.            Phone call duration:  18 minutes    Max Orantes PA-C

## 2020-05-07 ENCOUNTER — VIRTUAL VISIT (OUTPATIENT)
Dept: FAMILY MEDICINE | Facility: CLINIC | Age: 65
End: 2020-05-07
Payer: COMMERCIAL

## 2020-05-07 DIAGNOSIS — R80.9 TYPE 2 DIABETES MELLITUS WITH MICROALBUMINURIA, WITHOUT LONG-TERM CURRENT USE OF INSULIN (H): ICD-10-CM

## 2020-05-07 DIAGNOSIS — J44.1 COPD EXACERBATION (H): ICD-10-CM

## 2020-05-07 DIAGNOSIS — I10 ESSENTIAL HYPERTENSION WITH GOAL BLOOD PRESSURE LESS THAN 140/90: ICD-10-CM

## 2020-05-07 DIAGNOSIS — E66.01 MORBID OBESITY (H): ICD-10-CM

## 2020-05-07 DIAGNOSIS — E11.29 TYPE 2 DIABETES MELLITUS WITH MICROALBUMINURIA, WITHOUT LONG-TERM CURRENT USE OF INSULIN (H): ICD-10-CM

## 2020-05-07 PROCEDURE — 99214 OFFICE O/P EST MOD 30 MIN: CPT | Mod: TEL | Performed by: PHYSICIAN ASSISTANT

## 2020-05-07 RX ORDER — LOSARTAN POTASSIUM 100 MG/1
100 TABLET ORAL DAILY
Qty: 90 TABLET | Refills: 0 | Status: SHIPPED | OUTPATIENT
Start: 2020-05-07 | End: 2020-08-05

## 2020-05-07 RX ORDER — AMLODIPINE BESYLATE 10 MG/1
10 TABLET ORAL DAILY
Qty: 90 TABLET | Refills: 0 | Status: SHIPPED | OUTPATIENT
Start: 2020-05-07 | End: 2020-08-31

## 2020-06-10 ENCOUNTER — TELEPHONE (OUTPATIENT)
Dept: FAMILY MEDICINE | Facility: CLINIC | Age: 65
End: 2020-06-10

## 2020-06-10 DIAGNOSIS — R80.9 TYPE 2 DIABETES MELLITUS WITH MICROALBUMINURIA, WITHOUT LONG-TERM CURRENT USE OF INSULIN (H): Primary | ICD-10-CM

## 2020-06-10 DIAGNOSIS — E11.29 TYPE 2 DIABETES MELLITUS WITH MICROALBUMINURIA, WITHOUT LONG-TERM CURRENT USE OF INSULIN (H): Primary | ICD-10-CM

## 2020-06-10 NOTE — TELEPHONE ENCOUNTER
The FDA recently announced a voluntary suggested recall of all Metformin XR formulations due to NDMA levels in excess of the daily acceptable intake limit.  Currently this does not impact all manufacturers, however, we anticipate a long term shortage and/or discontinuation of most Metformin XR formulations.    At this time, we ask that you currently proactively change the patient's Metformin XR prescription to an IR formulation as the IR formulation does not appear to have been impacted.    Please send a new prescription to replace Metformin XR.  For questions, please call the [place] pharmacy at [phone]. Thank You.

## 2020-08-02 DIAGNOSIS — R80.9 TYPE 2 DIABETES MELLITUS WITH MICROALBUMINURIA, WITHOUT LONG-TERM CURRENT USE OF INSULIN (H): ICD-10-CM

## 2020-08-02 DIAGNOSIS — E11.29 TYPE 2 DIABETES MELLITUS WITH MICROALBUMINURIA, WITHOUT LONG-TERM CURRENT USE OF INSULIN (H): ICD-10-CM

## 2020-08-02 DIAGNOSIS — I10 ESSENTIAL HYPERTENSION WITH GOAL BLOOD PRESSURE LESS THAN 140/90: ICD-10-CM

## 2020-08-02 NOTE — LETTER
August 11, 2020        Cooper Vargas  [id number]   [place] [id number]      Dear Stephon,    Your medication has been approved for losartan (COZAAR) 100 MG tablet for one month only.    However, you are due for a follow up appointment for further refills. Please schedule this visit at your earliest convenience.    Thank you.      Max Orantes's Care Team

## 2020-08-05 NOTE — TELEPHONE ENCOUNTER
Routing refill request to provider for review/approval because:  Labs/BP not current:    BP Readings from Last 3 Encounters:   05/21/19 122/78   02/12/19 126/74   01/23/19 113/76     Creatinine   Date Value Ref Range Status   05/15/2019 0.95 0.66 - 1.25 mg/dL Final     Potassium   Date Value Ref Range Status   05/15/2019 4.2 3.4 - 5.3 mmol/L Final   Last Written Prescription Date:  5/7/20  Last Fill Quantity: 90,  # refills: 0     Last Virtual Visit 5/7/20  with prescribing provider:  Max Orantes PA-C with plan to return in 3 month (8/7/20)    Future Office Visit:  None.    Pended for 1 month with appointment reminder.     Starla Moreno RN BSN

## 2020-08-06 RX ORDER — LOSARTAN POTASSIUM 100 MG/1
TABLET ORAL
Qty: 30 TABLET | Refills: 0 | Status: SHIPPED | OUTPATIENT
Start: 2020-08-06 | End: 2020-08-31

## 2020-08-17 ENCOUNTER — DOCUMENTATION ONLY (OUTPATIENT)
Dept: LAB | Facility: CLINIC | Age: 65
End: 2020-08-17

## 2020-08-17 DIAGNOSIS — E11.29 TYPE 2 DIABETES MELLITUS WITH MICROALBUMINURIA, WITHOUT LONG-TERM CURRENT USE OF INSULIN (H): Primary | ICD-10-CM

## 2020-08-17 DIAGNOSIS — E78.5 HYPERLIPIDEMIA LDL GOAL <100: ICD-10-CM

## 2020-08-17 DIAGNOSIS — R80.9 TYPE 2 DIABETES MELLITUS WITH MICROALBUMINURIA, WITHOUT LONG-TERM CURRENT USE OF INSULIN (H): Primary | ICD-10-CM

## 2020-08-17 DIAGNOSIS — Z12.5 SCREENING FOR PROSTATE CANCER: ICD-10-CM

## 2020-08-17 NOTE — PROGRESS NOTES
Patient coming in to [facility] lab for blood work on 08/26/20. Please place future orders for him.Thank you!

## 2020-08-26 DIAGNOSIS — Z12.5 SCREENING FOR PROSTATE CANCER: ICD-10-CM

## 2020-08-26 DIAGNOSIS — E11.29 TYPE 2 DIABETES MELLITUS WITH MICROALBUMINURIA, WITHOUT LONG-TERM CURRENT USE OF INSULIN (H): ICD-10-CM

## 2020-08-26 DIAGNOSIS — R80.9 TYPE 2 DIABETES MELLITUS WITH MICROALBUMINURIA, WITHOUT LONG-TERM CURRENT USE OF INSULIN (H): ICD-10-CM

## 2020-08-26 DIAGNOSIS — E78.5 HYPERLIPIDEMIA LDL GOAL <100: ICD-10-CM

## 2020-08-26 LAB
ANION GAP SERPL CALCULATED.3IONS-SCNC: 5 MMOL/L (ref 3–14)
BUN SERPL-MCNC: 13 MG/DL (ref 7–30)
CALCIUM SERPL-MCNC: 9.4 MG/DL (ref 8.5–10.1)
CHLORIDE SERPL-SCNC: 106 MMOL/L (ref 94–109)
CHOLEST SERPL-MCNC: 258 MG/DL
CO2 SERPL-SCNC: 29 MMOL/L (ref 20–32)
CREAT SERPL-MCNC: 0.88 MG/DL (ref 0.66–1.25)
CREAT UR-MCNC: 120 MG/DL
GFR SERPL CREATININE-BSD FRML MDRD: >90 ML/MIN/{1.73_M2}
GLUCOSE SERPL-MCNC: 125 MG/DL (ref 70–99)
HBA1C MFR BLD: 6.6 % (ref 0–5.6)
HDLC SERPL-MCNC: 57 MG/DL
LDLC SERPL CALC-MCNC: 139 MG/DL
MICROALBUMIN UR-MCNC: 37 MG/L
MICROALBUMIN/CREAT UR: 31 MG/G CR (ref 0–17)
NONHDLC SERPL-MCNC: 201 MG/DL
POTASSIUM SERPL-SCNC: 4.8 MMOL/L (ref 3.4–5.3)
PSA SERPL-ACNC: 0.57 UG/L (ref 0–4)
SODIUM SERPL-SCNC: 140 MMOL/L (ref 133–144)
TRIGL SERPL-MCNC: 310 MG/DL

## 2020-08-26 PROCEDURE — 80048 BASIC METABOLIC PNL TOTAL CA: CPT | Performed by: PHYSICIAN ASSISTANT

## 2020-08-26 PROCEDURE — G0103 PSA SCREENING: HCPCS | Performed by: PHYSICIAN ASSISTANT

## 2020-08-26 PROCEDURE — 36415 COLL VENOUS BLD VENIPUNCTURE: CPT | Performed by: PHYSICIAN ASSISTANT

## 2020-08-26 PROCEDURE — 83036 HEMOGLOBIN GLYCOSYLATED A1C: CPT | Performed by: PHYSICIAN ASSISTANT

## 2020-08-26 PROCEDURE — 80061 LIPID PANEL: CPT | Performed by: PHYSICIAN ASSISTANT

## 2020-08-26 PROCEDURE — 82043 UR ALBUMIN QUANTITATIVE: CPT | Performed by: PHYSICIAN ASSISTANT

## 2020-08-31 ENCOUNTER — OFFICE VISIT (OUTPATIENT)
Dept: FAMILY MEDICINE | Facility: CLINIC | Age: 65
End: 2020-08-31
Payer: COMMERCIAL

## 2020-08-31 VITALS
BODY MASS INDEX: 43.55 KG/M2 | HEIGHT: 69 IN | SYSTOLIC BLOOD PRESSURE: 120 MMHG | TEMPERATURE: 98.2 F | HEART RATE: 78 BPM | OXYGEN SATURATION: 93 % | DIASTOLIC BLOOD PRESSURE: 79 MMHG | RESPIRATION RATE: 24 BRPM | WEIGHT: 294 LBS

## 2020-08-31 DIAGNOSIS — J44.1 COPD EXACERBATION (H): ICD-10-CM

## 2020-08-31 DIAGNOSIS — E11.29 TYPE 2 DIABETES MELLITUS WITH MICROALBUMINURIA, WITHOUT LONG-TERM CURRENT USE OF INSULIN (H): ICD-10-CM

## 2020-08-31 DIAGNOSIS — M75.92 LEFT SUPRASPINATUS TENDINITIS: ICD-10-CM

## 2020-08-31 DIAGNOSIS — I10 ESSENTIAL HYPERTENSION WITH GOAL BLOOD PRESSURE LESS THAN 140/90: ICD-10-CM

## 2020-08-31 DIAGNOSIS — M25.512 CHRONIC LEFT SHOULDER PAIN: ICD-10-CM

## 2020-08-31 DIAGNOSIS — G89.29 CHRONIC LEFT SHOULDER PAIN: ICD-10-CM

## 2020-08-31 DIAGNOSIS — R80.9 TYPE 2 DIABETES MELLITUS WITH MICROALBUMINURIA, WITHOUT LONG-TERM CURRENT USE OF INSULIN (H): ICD-10-CM

## 2020-08-31 DIAGNOSIS — Z00.00 ROUTINE GENERAL MEDICAL EXAMINATION AT A HEALTH CARE FACILITY: Primary | ICD-10-CM

## 2020-08-31 PROCEDURE — 99207 C FOOT EXAM  NO CHARGE: CPT | Mod: 25 | Performed by: PHYSICIAN ASSISTANT

## 2020-08-31 PROCEDURE — 99396 PREV VISIT EST AGE 40-64: CPT | Performed by: PHYSICIAN ASSISTANT

## 2020-08-31 PROCEDURE — 99214 OFFICE O/P EST MOD 30 MIN: CPT | Mod: 25 | Performed by: PHYSICIAN ASSISTANT

## 2020-08-31 RX ORDER — LOSARTAN POTASSIUM AND HYDROCHLOROTHIAZIDE 12.5; 1 MG/1; MG/1
1 TABLET ORAL DAILY
Qty: 90 TABLET | Refills: 1 | Status: SHIPPED | OUTPATIENT
Start: 2020-08-31 | End: 2021-02-22

## 2020-08-31 RX ORDER — AMLODIPINE BESYLATE 10 MG/1
5 TABLET ORAL DAILY
Qty: 90 TABLET | Refills: 1 | Status: SHIPPED | OUTPATIENT
Start: 2020-08-31 | End: 2021-08-30

## 2020-08-31 ASSESSMENT — ANXIETY QUESTIONNAIRES
3. WORRYING TOO MUCH ABOUT DIFFERENT THINGS: SEVERAL DAYS
5. BEING SO RESTLESS THAT IT IS HARD TO SIT STILL: NOT AT ALL
6. BECOMING EASILY ANNOYED OR IRRITABLE: SEVERAL DAYS
GAD7 TOTAL SCORE: 6
2. NOT BEING ABLE TO STOP OR CONTROL WORRYING: SEVERAL DAYS
7. FEELING AFRAID AS IF SOMETHING AWFUL MIGHT HAPPEN: SEVERAL DAYS
IF YOU CHECKED OFF ANY PROBLEMS ON THIS QUESTIONNAIRE, HOW DIFFICULT HAVE THESE PROBLEMS MADE IT FOR YOU TO DO YOUR WORK, TAKE CARE OF THINGS AT HOME, OR GET ALONG WITH OTHER PEOPLE: SOMEWHAT DIFFICULT
1. FEELING NERVOUS, ANXIOUS, OR ON EDGE: SEVERAL DAYS

## 2020-08-31 ASSESSMENT — PATIENT HEALTH QUESTIONNAIRE - PHQ9
SUM OF ALL RESPONSES TO PHQ QUESTIONS 1-9: 9
5. POOR APPETITE OR OVEREATING: SEVERAL DAYS

## 2020-08-31 ASSESSMENT — MIFFLIN-ST. JEOR: SCORE: 2118.96

## 2020-08-31 NOTE — PROGRESS NOTES
3  SUBJECTIVE:   CC: Cooper Vargas is an 65 year old male who presents for preventive health visit.     Healthy Habits:  Do you get at least three servings of calcium containing foods daily (dairy, green leafy vegetables, etc.)? no  Amount of exercise or daily activities, outside of work: none  Problems taking medications regularly No, but concerned about recalls regarding cancer causing agents  Medication side effects: Yes feet feel swollen and hurt  Have you had an eye exam in the past two years? no  Do you see a dentist twice per year? no  Do you have sleep apnea, excessive snoring or daytime drowsiness?no  Reviewed lab work with him.  Overall his numbers look good.   Doesn't tolerated statins. I continue to advise him to work on a Lower fat, higher fiber diet and consistent exercise.  Legs swollen and uncomfortable. Limit activities. Suspect amlodipine.   Recheck of dm and htn.  Left neck pain. Radiates into his shoulder.  Today's PHQ-2 Score:   PHQ-2 (  [employer]) 2020   Q1: Little interest or pleasure in doing things 2 0   Q2: Feeling down, depressed or hopeless 2 0   PHQ-2 Score 4 0       Abuse: Current or Past(Physical, Sexual or Emotional)- No  Do you feel safe in your environment? Yes        Social History     Tobacco Use     Smoking status: Former Smoker     Packs/day: 1.00     Years: 42.00     Pack years: 42.00     Types: Cigarettes     Last attempt to quit: 2015     Years since quittin.9     Smokeless tobacco: Never Used   Substance Use Topics     Alcohol use: Yes     Alcohol/week: 0.0 standard drinks     Comment: social drinks on the weekends.12 drinks (imelda)weekly     If you drink alcohol do you typically have >3 drinks per day or >7 drinks per week? No                      Last PSA:   PSA   Date Value Ref Range Status   2020 0.57 0 - 4 ug/L Final     Comment:     Assay Method:  Chemiluminescence using Siemens Vista analyzer       Reviewed orders with patient.  Reviewed health maintenance and updated orders accordingly - Yes  Lab work is in process  Labs reviewed in EPIC  BP Readings from Last 3 Encounters:   20 120/79   19 122/78   19 126/74    Wt Readings from Last 3 Encounters:   20 133.4 kg (294 lb)   19 129.3 kg (285 lb)   19 131.1 kg (289 lb)                  Patient Active Problem List   Diagnosis     GERD (gastroesophageal reflux disease)     Plantar fasciitis     Fatigue     Helicobacter pylori infection     Lyme disease     Smoking     Obesity     Tremors, hands     Hyperlipidemia LDL goal <100     LISETTE (obstructive sleep apnea)-Moderate (AHI 21)     Advanced directives, counseling/discussion     Acute gouty arthritis     Bronchitis, mucopurulent recurrent (H)     Mixed simple and mucopurulent chronic bronchitis (H)     Essential hypertension with goal blood pressure less than 140/90     Type 2 diabetes mellitus with microalbuminuria, without long-term current use of insulin (H)     Morbid obesity (H)     COPD exacerbation (H)     Past Surgical History:   Procedure Laterality Date     KNEE SURGERY       ORTHOPEDIC SURGERY      Right knee     ORTHOPEDIC SURGERY  's    Right ankle     rib surgery  1982    Top right side rib removed       Social History     Tobacco Use     Smoking status: Former Smoker     Packs/day: 1.00     Years: 42.00     Pack years: 42.00     Types: Cigarettes     Last attempt to quit: 2015     Years since quittin.9     Smokeless tobacco: Never Used   Substance Use Topics     Alcohol use: Yes     Alcohol/week: 0.0 standard drinks     Comment: social drinks on the weekends.12 drinks (imelda)weekly     Family History   Problem Relation Age of Onset     Diabetes Paternal Grandmother      Alzheimer Disease Paternal Grandmother      Neurologic Disorder Sister         migraines     Neurologic Disorder Son         Shaking of hands. Age 30 years         Current Outpatient Medications   Medication Sig  Dispense Refill     amLODIPine (NORVASC) 10 MG tablet Take 0.5 tablets (5 mg) by mouth daily 90 tablet 1     losartan-hydrochlorothiazide (HYZAAR) 100-12.5 MG tablet Take 1 tablet by mouth daily 90 tablet 1     metFORMIN (GLUCOPHAGE) 500 MG tablet Take 1 tablet (500 mg) by mouth 2 times daily (with meals) 180 tablet 1     SM ASPIRIN ADULT LOW STRENGTH 81 MG EC tablet TAKE ONE TABLET BY MOUTH ONCE DAILY 90 tablet 1     order for DME Equipment being ordered: CPAP equipment all supplies especially hoses and nose guards. 1 each 1     STATIN NOT PRESCRIBED, INTENTIONAL, 1 each daily Please choose reason not prescribed, below       Allergies   Allergen Reactions     Penicillins      Recent Labs   Lab Test 08/26/20  0836 05/15/19  0931 10/10/18  1211 03/26/18  1157 11/30/17  0850  07/19/16  0912 06/06/16  0809 03/21/16  1620   A1C 6.6* 6.4* 6.5* 6.9*  --    < > 6.4*  --  6.4*   * 127*  --   --  154*   < >  --   --   --    HDL 57 50  --   --  57   < >  --   --   --    TRIG 310* 264*  --   --  195*   < >  --   --   --    ALT  --   --   --   --   --   --  75* 74* 133*   CR 0.88 0.95 0.98  --   --    < > 0.92 0.82 0.88   GFRESTIMATED >90 85 78  --   --    < > 84 >90  Non  GFR Calc   89   GFRESTBLACK >90 >90 >90  --   --    < > >90   GFR Calc   >90   GFR Calc   >90   GFR Calc     POTASSIUM 4.8 4.2 4.6  --   --    < > 4.4 4.2 4.2   TSH  --   --   --  1.96  --   --   --   --  2.54    < > = values in this interval not displayed.        Reviewed and updated as needed this visit by clinical staff  Tobacco  Allergies  Meds  Med Hx  Surg Hx  Fam Hx  Soc Hx        Reviewed and updated as needed this visit by Provider        Past Medical History:   Diagnosis Date     BMI 39.0-39.9,adult      Fatigue      GERD (gastroesophageal reflux disease)      Helicobacter pylori 1999     Hyperlipidemia LDL goal < 130      Hypertension      Lyme disease 06/2003     LISETTE  "(obstructive sleep apnea)-Moderate (AHI 21) 2/3/2012     Seasonal allergies      Tobacco abuse       Past Surgical History:   Procedure Laterality Date     KNEE SURGERY       ORTHOPEDIC SURGERY  2009    Right knee     ORTHOPEDIC SURGERY  1970's    Right ankle     rib surgery  1982    Top right side rib removed       ROS:  CONSTITUTIONAL: NEGATIVE for fever, chills, change in weight  INTEGUMENTARY/SKIN: NEGATIVE for worrisome rashes, moles or lesions  EYES: NEGATIVE for vision changes or irritation  ENT: NEGATIVE for ear, mouth and throat problems  RESP: NEGATIVE for significant cough or SOB  CV: NEGATIVE for chest pain, palpitations or peripheral edema  GI: NEGATIVE for nausea, abdominal pain, heartburn, or change in bowel habits   male: negative for dysuria, hematuria, decreased urinary stream, erectile dysfunction, urethral discharge  MUSCULOSKELETAL: NEGATIVE for significant arthralgias or myalgia  NEURO: NEGATIVE for weakness, dizziness or paresthesias  PSYCHIATRIC: NEGATIVE for changes in mood or affect    OBJECTIVE:   /79   Pulse 78   Temp 98.2  F (36.8  C) (Tympanic)   Resp 24   Ht 1.753 m (5' 9\")   Wt 133.4 kg (294 lb)   SpO2 93%   BMI 43.42 kg/m    EXAM:  GENERAL: healthy, alert and no distress  EYES: Eyes grossly normal to inspection, PERRL and conjunctivae and sclerae normal  HENT: ear canals and TM's normal, nose and mouth without ulcers or lesions  NECK: no adenopathy, no asymmetry, masses, or scars and thyroid normal to palpation  RESP: lungs clear to auscultation - no rales, rhonchi or wheezes  CV: regular rate and rhythm, normal S1 S2, no S3 or S4, no murmur, click or rub, no peripheral edema and peripheral pulses strong  ABDOMEN: soft, nontender, no hepatosplenomegaly, no masses and bowel sounds normal  MS: no gross musculoskeletal defects noted, no edema  SKIN: no suspicious lesions or rashes  NEURO: Normal strength and tone, mentation intact and speech normal  PSYCH: mentation " "appears normal, affect normal/bright  Diabetic foot exam: normal DP and PT pulses, no trophic changes or ulcerative lesions and reduced sensation of both feet.  Negative spurlings test. Neck rom normal.   Left trigger point involving his trap.  Shoulder exam shows positive impingement signs are present with pain at high arc of abduction and forward flexion on left. There is tenderness of the lateral shoulder. Early frozen shoulder suspected. .      Diagnostic Test Results:  Labs reviewed in Epic    ASSESSMENT/PLAN:       ICD-10-CM    1. Routine general medical examination at a health care facility  Z00.00    2. Essential hypertension with goal blood pressure less than 140/90  I10 amLODIPine (NORVASC) 10 MG tablet     losartan-hydrochlorothiazide (HYZAAR) 100-12.5 MG tablet   3. Type 2 diabetes mellitus with microalbuminuria, without long-term current use of insulin (H)  E11.29 OPTOMETRY REFERRAL    R80.9 FOOT EXAM   4. COPD exacerbation (H)  J44.1        COUNSELING:  Reviewed preventive health counseling, as reflected in patient instructions       Regular exercise       Healthy diet/nutrition    Estimated body mass index is 43.42 kg/m  as calculated from the following:    Height as of this encounter: 1.753 m (5' 9\").    Weight as of this encounter: 133.4 kg (294 lb).    Weight management plan: Discussed healthy diet and exercise guidelines    He reports that he quit smoking about 4 years ago. His smoking use included cigarettes. He has a 42.00 pack-year smoking history. He has never used smokeless tobacco.      Counseling Resources:  ATP IV Guidelines  Pooled Cohorts Equation Calculator  FRAX Risk Assessment  ICSI Preventive Guidelines  Dietary Guidelines for Americans, 2010  USDA's MyPlate  ASA Prophylaxis  Lung CA Screening    Max Orantes PA-C  [facility] [place]  "

## 2020-08-31 NOTE — PATIENT INSTRUCTIONS
Preventive Health Recommendations  Male Ages 50 - 64    Yearly exam:             See your health care provider every year in order to  o   Review health changes.   o   Discuss preventive care.    o   Review your medicines if your doctor has prescribed any.     Have a cholesterol test every 5 years, or more frequently if you are at risk for high cholesterol/heart disease.     Have a diabetes test (fasting glucose) every three years. If you are at risk for diabetes, you should have this test more often.     Have a colonoscopy at age 50, or have a yearly FIT test (stool test). These exams will check for colon cancer.      Talk with your health care provider about whether or not a prostate cancer screening test (PSA) is right for you.    You should be tested each year for STDs (sexually transmitted diseases), if you re at risk.     Shots: Get a flu shot each year. Get a tetanus shot every 10 years.     Nutrition:    Eat at least 5 servings of fruits and vegetables daily.     Eat whole-grain bread, whole-wheat pasta and brown rice instead of white grains and rice.     Get adequate Calcium and Vitamin D.     Lifestyle    Exercise for at least 150 minutes a week (30 minutes a day, 5 days a week). This will help you control your weight and prevent disease.     Limit alcohol to one drink per day.     No smoking.     Wear sunscreen to prevent skin cancer.     See your dentist every six months for an exam and cleaning.     See your eye doctor every 1 to 2 years.    
At this time, balanced intake and promoting PO intake of utmost importance compared to wt loss.

## 2020-09-01 ASSESSMENT — ANXIETY QUESTIONNAIRES: GAD7 TOTAL SCORE: 6

## 2020-09-14 ENCOUNTER — OFFICE VISIT (OUTPATIENT)
Dept: ORTHOPEDICS | Facility: CLINIC | Age: 65
End: 2020-09-14
Attending: PHYSICIAN ASSISTANT
Payer: COMMERCIAL

## 2020-09-14 ENCOUNTER — ANCILLARY PROCEDURE (OUTPATIENT)
Dept: GENERAL RADIOLOGY | Facility: CLINIC | Age: 65
End: 2020-09-14
Attending: ORTHOPAEDIC SURGERY
Payer: COMMERCIAL

## 2020-09-14 VITALS
HEIGHT: 69 IN | SYSTOLIC BLOOD PRESSURE: 116 MMHG | DIASTOLIC BLOOD PRESSURE: 85 MMHG | WEIGHT: 294.4 LBS | HEART RATE: 79 BPM | BODY MASS INDEX: 43.6 KG/M2

## 2020-09-14 DIAGNOSIS — M25.512 CHRONIC LEFT SHOULDER PAIN: Primary | ICD-10-CM

## 2020-09-14 DIAGNOSIS — G89.29 CHRONIC LEFT SHOULDER PAIN: ICD-10-CM

## 2020-09-14 DIAGNOSIS — M19.012 PRIMARY OSTEOARTHRITIS OF LEFT SHOULDER: ICD-10-CM

## 2020-09-14 DIAGNOSIS — M75.42 IMPINGEMENT SYNDROME OF LEFT SHOULDER: ICD-10-CM

## 2020-09-14 DIAGNOSIS — G89.29 CHRONIC LEFT SHOULDER PAIN: Primary | ICD-10-CM

## 2020-09-14 DIAGNOSIS — M75.92 LEFT SUPRASPINATUS TENDINITIS: ICD-10-CM

## 2020-09-14 DIAGNOSIS — M25.512 CHRONIC LEFT SHOULDER PAIN: ICD-10-CM

## 2020-09-14 PROCEDURE — 99244 OFF/OP CNSLTJ NEW/EST MOD 40: CPT | Performed by: ORTHOPAEDIC SURGERY

## 2020-09-14 PROCEDURE — 73030 X-RAY EXAM OF SHOULDER: CPT | Mod: LT

## 2020-09-14 ASSESSMENT — MIFFLIN-ST. JEOR: SCORE: 2120.77

## 2020-09-14 ASSESSMENT — PAIN SCALES - GENERAL: PAINLEVEL: SEVERE PAIN (6)

## 2020-09-14 NOTE — LETTER
"    9/14/2020         RE: Cooper Vargas  [id number]  [place] [address]        Dear Colleague,    Thank you for referring your patient, Cooper Vargas, to the [facility] [place]. Please see a copy of my visit note below.    CHIEF COMPLAINT:   Chief Complaint   Patient presents with     Left Shoulder - Pain     Onset: injury 3 years ago when he fell on ice. Doesn't remember how he landed. Pain is in the upper arm and radiates to the top of the shoulder and into the posterior neck. He has always had an ache x 8-9 years. Last injection: 2/2019     Shoulder Pain     with Max Raeon. He has had multiple injections but they don't seem to help. His had can go numb throughout the day. Has had some massage therapy.   .    HISTORY:  Cooper Vargas is a 65 year old male, right  -hand dominant, who is seen in consultation at the request of Max Orantes for left shoulder pain that started  3 years ago after falling on some ice. Doesn't exactly remember how he landed. Locates pain in the upper arm and top of shoulder, up into the back of his neck, across the back of the  Shoulder.. He's had an \"ache\" 8-9 years in the shoulder but worse after the fall. Pain is aggravated with activities, driving, lifting overhead. Treatment has been injections, most recently 2/2019 without much relief. Has had massage therapy which did seem to help. Does not take any pain medications. Occasional numbness and tingling into the forearm and hands.    Sore at rest. Pain at night in certain positions. Stretching out the neck seems to help with shoulder pain.      Onset: Following acute injury.  Mechanism of injury:  blow/fall.  Symptoms have been waxing and waning since that time.  Aggrevated by: reaching, lifting  Relieved by: rest, massage  Present symptoms: pain with ADL's (dressing),  pain with overhead activities,  pain reaching behind back,  pain reaching out or away from body (flexion/ abduction),  positional " night pain,  pain lifting,  Pain location: lateral shoulder and deltoid and upper arm  Pain severity: 6/10  Pain quality: dull, aching, sharp and shooting  Frequency of symptoms: are constant  Associated symptoms: neck pain, radiating pain to arm, numbness/tingling both arms/hands, which seem to the patient to be related to the shoulder symptoms    Has not tried: Tylenol, NSAIDS, PT and intra-articular cortisone    Usual level of work activity: sales.    Other PMH:  has a past medical history of BMI 39.0-39.9,adult, Fatigue, GERD (gastroesophageal reflux disease), Helicobacter pylori (1999), Hyperlipidemia LDL goal < 130, Hypertension, Lyme disease (06/2003), LISETTE (obstructive sleep apnea)-Moderate (AHI 21) (2/3/2012), Seasonal allergies, and Tobacco abuse. He also has no past medical history of Bleeding disorder (H), Degenerative joint disease, Heart disease, Inflammatory arthritis, Kidney disease, Stroke (H), Surgical complications, Type II or unspecified type diabetes mellitus without mention of complication, not stated as uncontrolled, or Unspecified asthma(493.90).  Patient Active Problem List   Diagnosis     GERD (gastroesophageal reflux disease)     Plantar fasciitis     Fatigue     Helicobacter pylori infection     Lyme disease     Smoking     Obesity     Tremors, hands     Hyperlipidemia LDL goal <100     LISETTE (obstructive sleep apnea)-Moderate (AHI 21)     Advanced directives, counseling/discussion     Acute gouty arthritis     Bronchitis, mucopurulent recurrent (H)     Mixed simple and mucopurulent chronic bronchitis (H)     Essential hypertension with goal blood pressure less than 140/90     Type 2 diabetes mellitus with microalbuminuria, without long-term current use of insulin (H)     Morbid obesity (H)     COPD exacerbation (H)       Surgical Hx:  has a past surgical history that includes orthopedic surgery (2009); orthopedic surgery (1970's); rib surgery (1982); and knee surgery.    Medications:  "  Current Outpatient Medications:      amLODIPine (NORVASC) 10 MG tablet, Take 0.5 tablets (5 mg) by mouth daily, Disp: 90 tablet, Rfl: 1     losartan-hydrochlorothiazide (HYZAAR) 100-12.5 MG tablet, Take 1 tablet by mouth daily, Disp: 90 tablet, Rfl: 1     metFORMIN (GLUCOPHAGE) 500 MG tablet, Take 1 tablet (500 mg) by mouth 2 times daily (with meals), Disp: 180 tablet, Rfl: 1     order for DME, Equipment being ordered: CPAP equipment all supplies especially hoses and nose guards., Disp: 1 each, Rfl: 1     SM ASPIRIN ADULT LOW STRENGTH 81 MG EC tablet, TAKE ONE TABLET BY MOUTH ONCE DAILY, Disp: 90 tablet, Rfl: 1     STATIN NOT PRESCRIBED, INTENTIONAL,, 1 each daily Please choose reason not prescribed, below, Disp: , Rfl:     Allergies:   Allergies   Allergen Reactions     Penicillins        Social Hx: works in sales.  reports that he quit smoking about 4 years ago. His smoking use included cigarettes. He has a 42.00 pack-year smoking history. He has never used smokeless tobacco. He reports current alcohol use. He reports that he does not use drugs.    Family Hx: family history includes Alzheimer Disease in his paternal grandmother; Diabetes in his paternal grandmother; Neurologic Disorder in his sister and son..    REVIEW OF SYSTEMS: 10 point ROS neg other than the symptoms noted above in the HPI and PMH. Notables include  CONSTITUTIONAL:NEGATIVE for fever, chills, change in weight  INTEGUMENTARY/SKIN: NEGATIVE for worrisome rashes, moles or lesions  MUSCULOSKELETAL:See HPI above  NEURO: NEGATIVE for weakness, dizziness or paresthesias    PHYSICAL EXAM:  /85   Pulse 79   Ht 1.753 m (5' 9\")   Wt 133.5 kg (294 lb 6.4 oz)   BMI 43.48 kg/m     GENERAL APPEARANCE: healthy, alert, no distress  SKIN: no suspicious lesions or rashes  NEURO: Normal strength and tone, mentation intact and speech normal  PSYCH:  mentation appears normal and affect normal, not anxious  RESPIRATORY: No increased work of " breathing.  VASCULAR: Radial pulses 2+ and brisk cappillary refill   LYMPH: no palpable axillary lymphadenopathy or cervical neck lymphadenopathy.      MUSCULOSKELETAL:    NECK:  Cervical range of motion: decreased in all directions, causes pain in neck, does not cause pain into shoulder or reproduce shoulder pain  Posterior cervical spine IS tender to palpation over midline bony prominences  There is moderate tender to palpation along neck paraspinals and trapezius muscles      RIGHT UPPER EXTREMITY:  Sensation intact to light touch in median, radial, ulnar and axillary nerve distributions  Palpable 2+ radial pulse, brisk capillary refill to all fingers, wwp  Intact epl fpl fdp edc wrist flexion/extension biceps triceps deltoid    RIGHT SHOULDER:  Shoulder Inspection: no swelling, bruising, discoloration, or obvious deformity or asymmetry  Tender: greater tuberosity and upper trapezius muscle  Non-tender: AC joint  Range of Motion:   Active:forward flexion 170 degrees, external rotation  60 degrees, internal rotation  L2  Strength: forward flexion 5/5, External rotation 5/5   Impingement: all grade 1 positive.  Special tests: Empty Can: Negative    LEFT UPPER EXTREMITY:  Sensation intact to light touch in median, radial, ulnar and axillary nerve distributions  Palpable 2+ radial pulse, brisk capillary refill to all fingers, wwp  Intact epl fpl fdp edc wrist flexion/extension biceps triceps deltoid    LEFT SHOULDER:  Shoulder Inspection: no swelling, bruising, discoloration, or obvious deformity or asymmetry  Tender: anterior capsule, proximal bicep tendon, greater tuberosity, proximal humerus, supraspinatus , infraspinatus, upper trapezius muscle and rhomboids  Non-tender: AC joint  Range of Motion:   Active:forward flexion 90 degrees, external rotation  20 degrees, internal rotation  L4   Passive: forward flexion 155 degrees, external rotation  55 degrees  Strength: forward flexion 4+/5, painful, limited by pain,  External rotation 4/5, painful, limited by pain    Impingement: all grade 2 positive  Special tests: Empty Can: positive.      X-RAY INTERPRETATION: 3 views left  shoulder obtained 9/14/2020 were reviewed personally in clinic today with the patient. On my review, mild-moderate gleno-humeral degenerative changes. Mild acromio-clavicular degenerative changes.      MRI left  shoulder:  1/28/2019  Osseous acromion outlet: There is a type I configuration of the  acromion with no significant lateral or anterior downsloping. There  are moderate degenerative changes of the acromioclavicular joint. The  outlet is widely patent. No os acromiale.     Rotator cuff: There is moderate increased signal within and thickening  of the distal fibers of the supraspinatus tendon. This does not  involve the surface of the tendon and does not represent a tear. The  remaining rotator cuff structures are intact and unremarkable. No  fatty atrophy of the musculature is seen.      Labrum: Again suspected is degeneration with no labral tear seen.     Biceps tendon: The biceps tendon is in the bicipital groove. It is  intact and demonstrates normal signal.     Osseous structures and cartilaginous surfaces: No fracture or osseous  lesion is seen. No abnormal marrow signal intensity is identified.  Again seen is prominent grade 2 chondromalacia throughout the  glenohumeral articulation with what may be grade 3 chondromalacia  along the posterior glenoid. No focal chondral defect is seen.     Additional findings: No joint effusion is demonstrated. There is no  fluid within the subacromial-subdeltoid bursa. The adjacent soft  tissues are unremarkable.                                                          MRI IMPRESSION: moderate tendinosis of the supraspinatus with no rotator  cuff tear seen. There are moderate degenerative changes as described  above.        ASSESSMENT: Cooper Vargas is a 65 year old male, right  -hand dominant with chronic  left shoulder pain, gleno-humeral primary osteoarthritis, impingement syndrome.      PLAN:   * images reviewed showing osteoarthritis in the shoulder, likely source of pain  * some weakness, cannot rule out rotator cuff tear although 2 previous MRI without tears and noted to have tendinosis, osteoarthritis.  * recommend updated MRI to evaluate for rotator cuff tear  * if no rotator cuff tear, recommend referral to Sports Medicine for image-guided, gleno-humeral intra-articular cortisone injection, referral to Physical Therapy, home exercise program  * if rotator cuff tear, discuss surgical options at that time.  * over the counter pain control  * return to clinic as needed.    Dean Stahl M.D., M.S.  Dept. of Orthopaedic Surgery  [facility]    Again, thank you for allowing me to participate in the care of your patient.        Sincerely,        Dean Stahl MD

## 2020-09-14 NOTE — PROGRESS NOTES
"CHIEF COMPLAINT:   Chief Complaint   Patient presents with     Left Shoulder - Pain     Onset: injury 3 years ago when he fell on ice. Doesn't remember how he landed. Pain is in the upper arm and radiates to the top of the shoulder and into the posterior neck. He has always had an ache x 8-9 years. Last injection: 2/2019     Shoulder Pain     with Max Orantes. He has had multiple injections but they don't seem to help. His had can go numb throughout the day. Has had some massage therapy.   .    HISTORY:  Cooper Vargas is a 65 year old male, right  -hand dominant, who is seen in consultation at the request of Max Orantes for left shoulder pain that started  3 years ago after falling on some ice. Doesn't exactly remember how he landed. Locates pain in the upper arm and top of shoulder, up into the back of his neck, across the back of the  Shoulder.. He's had an \"ache\" 8-9 years in the shoulder but worse after the fall. Pain is aggravated with activities, driving, lifting overhead. Treatment has been injections, most recently 2/2019 without much relief. Has had massage therapy which did seem to help. Does not take any pain medications. Occasional numbness and tingling into the forearm and hands.    Sore at rest. Pain at night in certain positions. Stretching out the neck seems to help with shoulder pain.      Onset: Following acute injury.  Mechanism of injury:  blow/fall.  Symptoms have been waxing and waning since that time.  Aggrevated by: reaching, lifting  Relieved by: rest, massage  Present symptoms: pain with ADL's (dressing),  pain with overhead activities,  pain reaching behind back,  pain reaching out or away from body (flexion/ abduction),  positional night pain,  pain lifting,  Pain location: lateral shoulder and deltoid and upper arm  Pain severity: 6/10  Pain quality: dull, aching, sharp and shooting  Frequency of symptoms: are constant  Associated symptoms: neck pain, radiating pain to arm, " numbness/tingling both arms/hands, which seem to the patient to be related to the shoulder symptoms    Has not tried: Tylenol, NSAIDS, PT and intra-articular cortisone    Usual level of work activity: sales.    Other PMH:  has a past medical history of BMI 39.0-39.9,adult, Fatigue, GERD (gastroesophageal reflux disease), Helicobacter pylori (1999), Hyperlipidemia LDL goal < 130, Hypertension, Lyme disease (06/2003), LISETTE (obstructive sleep apnea)-Moderate (AHI 21) (2/3/2012), Seasonal allergies, and Tobacco abuse. He also has no past medical history of Bleeding disorder (H), Degenerative joint disease, Heart disease, Inflammatory arthritis, Kidney disease, Stroke (H), Surgical complications, Type II or unspecified type diabetes mellitus without mention of complication, not stated as uncontrolled, or Unspecified asthma(493.90).  Patient Active Problem List   Diagnosis     GERD (gastroesophageal reflux disease)     Plantar fasciitis     Fatigue     Helicobacter pylori infection     Lyme disease     Smoking     Obesity     Tremors, hands     Hyperlipidemia LDL goal <100     LISETTE (obstructive sleep apnea)-Moderate (AHI 21)     Advanced directives, counseling/discussion     Acute gouty arthritis     Bronchitis, mucopurulent recurrent (H)     Mixed simple and mucopurulent chronic bronchitis (H)     Essential hypertension with goal blood pressure less than 140/90     Type 2 diabetes mellitus with microalbuminuria, without long-term current use of insulin (H)     Morbid obesity (H)     COPD exacerbation (H)       Surgical Hx:  has a past surgical history that includes orthopedic surgery (2009); orthopedic surgery (1970's); rib surgery (1982); and knee surgery.    Medications:   Current Outpatient Medications:      amLODIPine (NORVASC) 10 MG tablet, Take 0.5 tablets (5 mg) by mouth daily, Disp: 90 tablet, Rfl: 1     losartan-hydrochlorothiazide (HYZAAR) 100-12.5 MG tablet, Take 1 tablet by mouth daily, Disp: 90 tablet, Rfl:  "1     metFORMIN (GLUCOPHAGE) 500 MG tablet, Take 1 tablet (500 mg) by mouth 2 times daily (with meals), Disp: 180 tablet, Rfl: 1     order for DME, Equipment being ordered: CPAP equipment all supplies especially hoses and nose guards., Disp: 1 each, Rfl: 1     SM ASPIRIN ADULT LOW STRENGTH 81 MG EC tablet, TAKE ONE TABLET BY MOUTH ONCE DAILY, Disp: 90 tablet, Rfl: 1     STATIN NOT PRESCRIBED, INTENTIONAL,, 1 each daily Please choose reason not prescribed, below, Disp: , Rfl:     Allergies:   Allergies   Allergen Reactions     Penicillins        Social Hx: works in sales.  reports that he quit smoking about 4 years ago. His smoking use included cigarettes. He has a 42.00 pack-year smoking history. He has never used smokeless tobacco. He reports current alcohol use. He reports that he does not use drugs.    Family Hx: family history includes Alzheimer Disease in his paternal grandmother; Diabetes in his paternal grandmother; Neurologic Disorder in his sister and son..    REVIEW OF SYSTEMS: 10 point ROS neg other than the symptoms noted above in the HPI and PMH. Notables include  CONSTITUTIONAL:NEGATIVE for fever, chills, change in weight  INTEGUMENTARY/SKIN: NEGATIVE for worrisome rashes, moles or lesions  MUSCULOSKELETAL:See HPI above  NEURO: NEGATIVE for weakness, dizziness or paresthesias    PHYSICAL EXAM:  /85   Pulse 79   Ht 1.753 m (5' 9\")   Wt 133.5 kg (294 lb 6.4 oz)   BMI 43.48 kg/m     GENERAL APPEARANCE: healthy, alert, no distress  SKIN: no suspicious lesions or rashes  NEURO: Normal strength and tone, mentation intact and speech normal  PSYCH:  mentation appears normal and affect normal, not anxious  RESPIRATORY: No increased work of breathing.  VASCULAR: Radial pulses 2+ and brisk cappillary refill   LYMPH: no palpable axillary lymphadenopathy or cervical neck lymphadenopathy.      MUSCULOSKELETAL:    NECK:  Cervical range of motion: decreased in all directions, causes pain in neck, does not " cause pain into shoulder or reproduce shoulder pain  Posterior cervical spine IS tender to palpation over midline bony prominences  There is moderate tender to palpation along neck paraspinals and trapezius muscles      RIGHT UPPER EXTREMITY:  Sensation intact to light touch in median, radial, ulnar and axillary nerve distributions  Palpable 2+ radial pulse, brisk capillary refill to all fingers, wwp  Intact epl fpl fdp edc wrist flexion/extension biceps triceps deltoid    RIGHT SHOULDER:  Shoulder Inspection: no swelling, bruising, discoloration, or obvious deformity or asymmetry  Tender: greater tuberosity and upper trapezius muscle  Non-tender: AC joint  Range of Motion:   Active:forward flexion 170 degrees, external rotation  60 degrees, internal rotation  L2  Strength: forward flexion 5/5, External rotation 5/5   Impingement: all grade 1 positive.  Special tests: Empty Can: Negative    LEFT UPPER EXTREMITY:  Sensation intact to light touch in median, radial, ulnar and axillary nerve distributions  Palpable 2+ radial pulse, brisk capillary refill to all fingers, wwp  Intact epl fpl fdp edc wrist flexion/extension biceps triceps deltoid    LEFT SHOULDER:  Shoulder Inspection: no swelling, bruising, discoloration, or obvious deformity or asymmetry  Tender: anterior capsule, proximal bicep tendon, greater tuberosity, proximal humerus, supraspinatus , infraspinatus, upper trapezius muscle and rhomboids  Non-tender: AC joint  Range of Motion:   Active:forward flexion 90 degrees, external rotation  20 degrees, internal rotation  L4   Passive: forward flexion 155 degrees, external rotation  55 degrees  Strength: forward flexion 4+/5, painful, limited by pain, External rotation 4/5, painful, limited by pain    Impingement: all grade 2 positive  Special tests: Empty Can: positive.      X-RAY INTERPRETATION: 3 views left  shoulder obtained 9/14/2020 were reviewed personally in clinic today with the patient. On my review,  mild-moderate gleno-humeral degenerative changes. Mild acromio-clavicular degenerative changes.      MRI left  shoulder:  1/28/2019  Osseous acromion outlet: There is a type I configuration of the  acromion with no significant lateral or anterior downsloping. There  are moderate degenerative changes of the acromioclavicular joint. The  outlet is widely patent. No os acromiale.     Rotator cuff: There is moderate increased signal within and thickening  of the distal fibers of the supraspinatus tendon. This does not  involve the surface of the tendon and does not represent a tear. The  remaining rotator cuff structures are intact and unremarkable. No  fatty atrophy of the musculature is seen.      Labrum: Again suspected is degeneration with no labral tear seen.     Biceps tendon: The biceps tendon is in the bicipital groove. It is  intact and demonstrates normal signal.     Osseous structures and cartilaginous surfaces: No fracture or osseous  lesion is seen. No abnormal marrow signal intensity is identified.  Again seen is prominent grade 2 chondromalacia throughout the  glenohumeral articulation with what may be grade 3 chondromalacia  along the posterior glenoid. No focal chondral defect is seen.     Additional findings: No joint effusion is demonstrated. There is no  fluid within the subacromial-subdeltoid bursa. The adjacent soft  tissues are unremarkable.                                                          MRI IMPRESSION: moderate tendinosis of the supraspinatus with no rotator  cuff tear seen. There are moderate degenerative changes as described  above.        ASSESSMENT: Cooper Vargas is a 65 year old male, right  -hand dominant with chronic left shoulder pain, gleno-humeral primary osteoarthritis, impingement syndrome.      PLAN:   * images reviewed showing osteoarthritis in the shoulder, likely source of pain  * some weakness, cannot rule out rotator cuff tear although 2 previous MRI without tears  and noted to have tendinosis, osteoarthritis.  * recommend updated MRI to evaluate for rotator cuff tear  * if no rotator cuff tear, recommend referral to Sports Medicine for image-guided, gleno-humeral intra-articular cortisone injection, referral to Physical Therapy, home exercise program  * if rotator cuff tear, discuss surgical options at that time.  * over the counter pain control  * return to clinic as needed.    Dean Stahl M.D., M.S.  Dept. of Orthopaedic Surgery  [facility]

## 2020-09-24 ENCOUNTER — ANCILLARY PROCEDURE (OUTPATIENT)
Dept: MRI IMAGING | Facility: CLINIC | Age: 65
End: 2020-09-24
Attending: ORTHOPAEDIC SURGERY
Payer: COMMERCIAL

## 2020-09-24 DIAGNOSIS — M25.512 CHRONIC LEFT SHOULDER PAIN: ICD-10-CM

## 2020-09-24 DIAGNOSIS — G89.29 CHRONIC LEFT SHOULDER PAIN: ICD-10-CM

## 2020-09-24 PROCEDURE — 73221 MRI JOINT UPR EXTREM W/O DYE: CPT | Mod: TC

## 2020-09-30 ENCOUNTER — TELEPHONE (OUTPATIENT)
Dept: ORTHOPEDICS | Facility: CLINIC | Age: 65
End: 2020-09-30

## 2020-09-30 DIAGNOSIS — M19.012 PRIMARY OSTEOARTHRITIS OF LEFT SHOULDER: Primary | ICD-10-CM

## 2020-09-30 NOTE — TELEPHONE ENCOUNTER
Called and spoke to Stephon regarding he MRI findings of moderate-high grade partial thickness supraspinatus tear, gleno-humeral osteoarthritis, biceps tendinosis, etc. Discussed treatment options, trying image-guided intra-articular injection with our Sports Medicine team, otherwise arthroscopy and debridement, possible rotator cuff repair, and lastly shoulder replacement for the osteoarthritis. At this time, he'd like to be least invasive as possible and would like to try image-guided injection. We will place a referral to our Sports Medicine team at [place] for image-guided intra-articular cortisone injection to the left shoulder.    He was appreciative of the call.    Dean Stahl M.D., M.S.  Dept. of Orthopaedic Surgery  [facility]

## 2020-10-21 ENCOUNTER — OFFICE VISIT (OUTPATIENT)
Dept: ORTHOPEDICS | Facility: CLINIC | Age: 65
End: 2020-10-21
Payer: COMMERCIAL

## 2020-10-21 VITALS
DIASTOLIC BLOOD PRESSURE: 76 MMHG | HEIGHT: 69 IN | SYSTOLIC BLOOD PRESSURE: 118 MMHG | WEIGHT: 294 LBS | BODY MASS INDEX: 43.55 KG/M2

## 2020-10-21 DIAGNOSIS — M19.012 PRIMARY OSTEOARTHRITIS OF LEFT SHOULDER: ICD-10-CM

## 2020-10-21 PROCEDURE — 20611 DRAIN/INJ JOINT/BURSA W/US: CPT | Mod: LT | Performed by: FAMILY MEDICINE

## 2020-10-21 RX ORDER — TRIAMCINOLONE ACETONIDE 40 MG/ML
40 INJECTION, SUSPENSION INTRA-ARTICULAR; INTRAMUSCULAR
Status: DISCONTINUED | OUTPATIENT
Start: 2020-10-21 | End: 2021-06-09 | Stop reason: ALTCHOICE

## 2020-10-21 RX ORDER — ROPIVACAINE HYDROCHLORIDE 5 MG/ML
3 INJECTION, SOLUTION EPIDURAL; INFILTRATION; PERINEURAL
Status: DISCONTINUED | OUTPATIENT
Start: 2020-10-21 | End: 2021-06-09 | Stop reason: ALTCHOICE

## 2020-10-21 RX ADMIN — ROPIVACAINE HYDROCHLORIDE 3 ML: 5 INJECTION, SOLUTION EPIDURAL; INFILTRATION; PERINEURAL at 09:10

## 2020-10-21 RX ADMIN — TRIAMCINOLONE ACETONIDE 40 MG: 40 INJECTION, SUSPENSION INTRA-ARTICULAR; INTRAMUSCULAR at 09:10

## 2020-10-21 ASSESSMENT — MIFFLIN-ST. JEOR: SCORE: 2118.96

## 2020-10-21 NOTE — PROGRESS NOTES
Cooper Vargas  :  1955  DOS: 10/21/2020  MRN: [id number]    Sports Medicine Clinic Procedure    Ultrasound Guided Left Intra-Articular Shoulder Injection    Clinical History: Generalized aching left shoulder pain over the past ~ 3+ years after falling and landing on left shoulder.  Left subacromial steroid injection last completed 2019 that provided minimal relief.    Diagnosis:   1. Primary osteoarthritis of left shoulder      Referring Physician: Dean Stahl MD  Large Joint Injection/Arthocentesis: L glenohumeral joint    Date/Time: 10/21/2020 9:10 AM  Performed by: Desmond Smiley DO  Authorized by: Desmond Smiley DO     Indications:  Pain  Needle Size:  21 G  Guidance: ultrasound    Approach:  Posterolateral  Location:  Shoulder      Site:  L glenohumeral joint  Medications:  3 mL ropivacaine 5 MG/ML; 40 mg triamcinolone 40 MG/ML  Outcome:  Tolerated well, no immediate complications  Procedure discussed: discussed risks, benefits, and alternatives    Consent Given by:  Patient  Timeout: timeout called immediately prior to procedure    Prep: patient was prepped and draped in usual sterile fashion          Impression:  Successful Left intra-articular shoulder injection.    Plan:  Follow up with Dr Stahl as directed   Expectations and goals of CSI reviewed  Often 2-3 days for steroid effect, and can take up to two weeks for maximum effect  We discussed modified progressive pain-free activity as tolerated  Do not overuse in first two weeks if feeling better due to concern for vulnerability while steroid is working  Supportive care reviewed  All questions were answered today  Contact us with additional questions or concerns  Signs and sx of concern reviewed        Desmond Smiley DO, CAQ  Primary Care Sports Medicine  [facility]

## 2020-10-21 NOTE — LETTER
10/21/2020         RE: Cooper Vargas  [address]  [place] [address]        Dear Colleague,    Thank you for referring your patient, Cooper Vargas, to the [facility]. Please see a copy of my visit note below.    Cooper Vargas  :  1955  DOS: 10/21/2020  MRN: [id number]    Sports Medicine Clinic Procedure    Ultrasound Guided Left Intra-Articular Shoulder Injection    Clinical History: Generalized aching left shoulder pain over the past ~ 3+ years after falling and landing on left shoulder.  Left subacromial steroid injection last completed 2019 that provided minimal relief.    Diagnosis:   1. Primary osteoarthritis of left shoulder      Referring Physician: Dean Stahl MD  Large Joint Injection/Arthocentesis: L glenohumeral joint    Date/Time: 10/21/2020 9:10 AM  Performed by: Desmond Smiley DO  Authorized by: Desmond Smiley DO     Indications:  Pain  Needle Size:  21 G  Guidance: ultrasound    Approach:  Posterolateral  Location:  Shoulder      Site:  L glenohumeral joint  Medications:  3 mL ropivacaine 5 MG/ML; 40 mg triamcinolone 40 MG/ML  Outcome:  Tolerated well, no immediate complications  Procedure discussed: discussed risks, benefits, and alternatives    Consent Given by:  Patient  Timeout: timeout called immediately prior to procedure    Prep: patient was prepped and draped in usual sterile fashion          Impression:  Successful Left intra-articular shoulder injection.    Plan:  Follow up with Dr Stahl as directed   Expectations and goals of CSI reviewed  Often 2-3 days for steroid effect, and can take up to two weeks for maximum effect  We discussed modified progressive pain-free activity as tolerated  Do not overuse in first two weeks if feeling better due to concern for vulnerability while steroid is working  Supportive care reviewed  All questions were answered today  Contact us with additional questions or  concerns  Signs and sx of concern reviewed        Desmond Smiley DO, CAQ  Primary Care Sports Medicine  [facility]       Again, thank you for allowing me to participate in the care of your patient.        Sincerely,        Desmond Smiley DO

## 2020-12-06 DIAGNOSIS — E11.29 TYPE 2 DIABETES MELLITUS WITH MICROALBUMINURIA, WITHOUT LONG-TERM CURRENT USE OF INSULIN (H): ICD-10-CM

## 2020-12-06 DIAGNOSIS — R80.9 TYPE 2 DIABETES MELLITUS WITH MICROALBUMINURIA, WITHOUT LONG-TERM CURRENT USE OF INSULIN (H): ICD-10-CM

## 2020-12-08 RX ORDER — ASPIRIN 81 MG/1
TABLET, COATED ORAL
Qty: 90 TABLET | Refills: 3 | Status: SHIPPED | OUTPATIENT
Start: 2020-12-08 | End: 2021-11-19

## 2020-12-28 ENCOUNTER — TELEPHONE (OUTPATIENT)
Dept: FAMILY MEDICINE | Facility: CLINIC | Age: 65
End: 2020-12-28

## 2020-12-28 DIAGNOSIS — J44.1 COPD EXACERBATION (H): ICD-10-CM

## 2020-12-28 NOTE — TELEPHONE ENCOUNTER
Patient asking for prescription sent to [facility] for bronchitis  He said he gets this twice a year    The last one you gave him worked the best    Call if questions  [phone]

## 2020-12-28 NOTE — TELEPHONE ENCOUNTER
"Patient was last seen 8/31/20 by PCP, \"copd exacerbation\" on diagnoses list.    Not due back until Feb 28, 2021.    I called and spoke to patient, he says he is having his typical COPD exacerbation/bronchitis.   Symptoms started before Thanksgiving.    Says he waited to see if it would resolve without Rx's but has continued, has productive cough, yellow sputum, denies fever, is short of breath but talking easily.  Denies chest pain or fainting but says is occasionally dizzy.      Reviewed med list, patient says he usually gets prednisone and an antibiotic, thinks the doxycycline was the latest one that worked the best.    Advised he stay hydrated, use inhalers.    Cued, routed to Max Orantes to address.    Dulce Alejandro RN  [facility]      "

## 2020-12-29 RX ORDER — PREDNISONE 20 MG/1
20 TABLET ORAL 2 TIMES DAILY
Qty: 14 TABLET | Refills: 0 | Status: SHIPPED | OUTPATIENT
Start: 2020-12-29 | End: 2021-06-09

## 2020-12-29 RX ORDER — DOXYCYCLINE HYCLATE 100 MG
100 TABLET ORAL 2 TIMES DAILY
Qty: 28 TABLET | Refills: 0 | Status: SHIPPED | OUTPATIENT
Start: 2020-12-29 | End: 2021-09-23

## 2020-12-29 NOTE — TELEPHONE ENCOUNTER
Patient notified and voiced understanding and agreement..  Ifrah Ramirez RN  [facility]

## 2021-01-18 ENCOUNTER — OFFICE VISIT (OUTPATIENT)
Dept: FAMILY MEDICINE | Facility: CLINIC | Age: 66
End: 2021-01-18
Payer: COMMERCIAL

## 2021-01-18 VITALS
DIASTOLIC BLOOD PRESSURE: 81 MMHG | TEMPERATURE: 97.7 F | WEIGHT: 295.2 LBS | RESPIRATION RATE: 24 BRPM | OXYGEN SATURATION: 91 % | HEART RATE: 84 BPM | SYSTOLIC BLOOD PRESSURE: 129 MMHG | BODY MASS INDEX: 43.59 KG/M2

## 2021-01-18 DIAGNOSIS — E11.29 TYPE 2 DIABETES MELLITUS WITH MICROALBUMINURIA, WITHOUT LONG-TERM CURRENT USE OF INSULIN (H): ICD-10-CM

## 2021-01-18 DIAGNOSIS — H91.93 CHANGE IN HEARING, BILATERAL: ICD-10-CM

## 2021-01-18 DIAGNOSIS — R80.9 TYPE 2 DIABETES MELLITUS WITH MICROALBUMINURIA, WITHOUT LONG-TERM CURRENT USE OF INSULIN (H): ICD-10-CM

## 2021-01-18 DIAGNOSIS — H93.13 TINNITUS, BILATERAL: Primary | ICD-10-CM

## 2021-01-18 DIAGNOSIS — R42 DIZZINESS: ICD-10-CM

## 2021-01-18 LAB
ANION GAP SERPL CALCULATED.3IONS-SCNC: 4 MMOL/L (ref 3–14)
BUN SERPL-MCNC: 16 MG/DL (ref 7–30)
CALCIUM SERPL-MCNC: 9 MG/DL (ref 8.5–10.1)
CHLORIDE SERPL-SCNC: 103 MMOL/L (ref 94–109)
CO2 SERPL-SCNC: 32 MMOL/L (ref 20–32)
CREAT SERPL-MCNC: 1.04 MG/DL (ref 0.66–1.25)
GFR SERPL CREATININE-BSD FRML MDRD: 76 ML/MIN/{1.73_M2}
GLUCOSE SERPL-MCNC: 154 MG/DL (ref 70–99)
HBA1C MFR BLD: 7.5 % (ref 0–5.6)
POTASSIUM SERPL-SCNC: 4.1 MMOL/L (ref 3.4–5.3)
SODIUM SERPL-SCNC: 139 MMOL/L (ref 133–144)

## 2021-01-18 PROCEDURE — 83036 HEMOGLOBIN GLYCOSYLATED A1C: CPT | Performed by: PHYSICIAN ASSISTANT

## 2021-01-18 PROCEDURE — 99214 OFFICE O/P EST MOD 30 MIN: CPT | Performed by: PHYSICIAN ASSISTANT

## 2021-01-18 PROCEDURE — 36415 COLL VENOUS BLD VENIPUNCTURE: CPT | Performed by: PHYSICIAN ASSISTANT

## 2021-01-18 PROCEDURE — 80048 BASIC METABOLIC PNL TOTAL CA: CPT | Performed by: PHYSICIAN ASSISTANT

## 2021-01-18 NOTE — PROGRESS NOTES
Rita Szymanski is a 65 year old who presents to clinic today for the following health issues    HPI       Dizziness  Onset/Duration: 1 month  Description:   Do you feel faint: no  Does it feel like the surroundings (bed, room) are moving: YES  Unsteady/off balance: YES  Have you passed out or fallen: no  Intensity: varies  Progression of Symptoms: same  Accompanying Signs & Symptoms:  Heart palpitations or chest pain: YES - maybe  Nausea, vomiting: YES - nausea  Weakness or lack of coordination in arms or legs: no  Vision or speech changes: YES - vision is blurry once and a while  Numbness or tingling: no  Ringing in ears (Tinnitus): YES  Hearing Loss: YES - or plugging  History:   Head trauma/concussion history: no  Previous similar symptoms: no  Recent bleeding history: no  Any new medications (BP?): no  Precipitating factors:   Worse with activity: no  Worse with head movement: no  Alleviating factors:   Does staying in a fixed position give relief: no   Therapies tried and outcome: heat on neck and shoulders and has flushed ears  Occasional diplopia.    Ear Problem  Tinnitus. Some muffled hearing. No vertigo. No ha's. No unilateral paresthesias or paresis.   No ear pain. No chest pain/sob/palps.  Review of Systems   Constitutional, HEENT, cardiovascular, pulmonary, GI, , musculoskeletal, neuro, skin, endocrine and psych systems are negative, except as otherwise noted.      Objective    /81   Pulse 84   Temp 97.7  F (36.5  C) (Tympanic)   Resp 24   Wt 133.9 kg (295 lb 3.2 oz)   SpO2 91%   BMI 43.59 kg/m    Body mass index is 43.59 kg/m .  Physical Exam     Eye exam - right eye normal lid, conjunctiva, cornea, pupil and fundus, left eye normal lid, conjunctiva, cornea, pupil and fundus.  ENT exam reveals - bilateral TM fluid noted, neck without nodes, throat normal without erythema or exudate, sinuses nontender, post nasal drip noted, nasal mucosa congested and nasal mucosa pale and  congested.  Thyroid not palpable, not enlarged, no nodules detected.  CHEST:chest clear to IPPA, no tachypnea, retractions or cyanosis and S1, S2 normal, no murmur, no gallop, rate regular.  His disks are flat. Pupils equal, round, reactive to light. Extraocular movements full. Visual fields full. Face moves symmetrically. Tongue midline. Hearing mildly decreased to finger-rubbing at approximately 6-8 inches. Neck without bruits. CV: S1, S2. Motor strength 5/5. Reflexes were 2/4. Toe signs were downgoing. Normal position sense. Good finger-nose-finger and fine finger movement. Gait: he stephon from a chair without difficulty and has a mildly broad-based gait.    Cooper was seen today for ear problem and dizziness.    Diagnoses and all orders for this visit:    Tinnitus, bilateral  -     OTOLARYNGOLOGY REFERRAL  -     MR Brain w/o Contrast; Future    Change in hearing, bilateral  -     OTOLARYNGOLOGY REFERRAL  -     MR Brain w/o Contrast; Future    Dizziness  -     OTOLARYNGOLOGY REFERRAL  -     MR Brain w/o Contrast; Future    Type 2 diabetes mellitus with microalbuminuria, without long-term current use of insulin (H)  -     Hemoglobin A1c  -     Basic metabolic panel  (Ca, Cl, CO2, Creat, Gluc, K, Na, BUN)      work on lifestyle modification  Advised supportive and symptomatic treatment.  Follow up with Provider - if condition persists or worsens.   Dizziness has improved a lot so no anti-vertigo meds prescribed today.  Query meniere's . Will have ENT confirm and they may adjust diuretic accordingly.  Recommended a low sodium diet.

## 2021-01-18 NOTE — PATIENT INSTRUCTIONS
Patient Education     Meniere s Disease    Meniere s disease is a chronic recurring condition. It is due to a problem with the inner ear. This is the part of the ear responsible for balance as well as hearing. Symptoms include:    Sudden attacks of vertigo. Vertigo is a spinning or whirling feeling that causes balance problems. These attacks often include nausea, vomiting, and sweating. During an attack of vertigo, head movement and body position changes will worsen symptoms.    Hearing problems. Hearing is often partly or completely lost during the vertigo attack, then comes back. Over time, though, hearing can be affected.    Tinnitus. This is ringing, buzzing, whistling, or roaring noises in the ear. It may come and go or always be present.     Feeling of pressure. You may also have a feeling of pressure or fullness in the ear.  Attacks may occur weeks, months, or even years apart. Each episode of vertigo may last 20 minutes to several hours. The symptoms are due to changes in fluid in the inner ear canals. The exact cause is not known.   Treatment for Meniere s disease includes lifestyle changes, medicines, and medical procedures. Surgery may be advised in severe cases.  Home care  Medicines  You may be prescribed medicine to take regularly to help prevent attacks. You may also be prescribed medicine to take only during attacks. Take these as directed.  Lifestyle changes  These lifestyle changes can help make attacks less frequent or less severe.     Reduce your salt intake. Eating too much salt can make this condition worse. A common recommendation is to limit sodium to no more than 2,300 mg a day. Talk to your healthcare provider about ways to limit sodium in your diet.    Quit smoking.    Limit alcohol and caffeine.    Try to limit stress.  During Attacks  If an attack is about to start or has started, take any medicine you have been prescribed for an attack. Lie down on a firm surface in a darkened  room. Stay as still as possible. Stay away from bright light. Do not try to read or watch TV. Don't get up until the spinning passes.  Follow-up care  Follow up with your healthcare provider for further assessment and treatment. If you have been referred to a specialist, make an appointment right away.  When to seek medical advice  Call your healthcare provider right away if you have:    Symptoms that get worse    Weakness that gets worse    Fainting    Headache or unusual drowsiness    Trouble with speech or movement    Fever of 100.4 F (38 C) or higher, or as directed by your healthcare provider  Jonnie last reviewed this educational content on 10/1/2017    3787-1137 [employer]. [address] [place] [address], [place] [address]. All rights reserved. This information is not intended as a substitute for professional medical care. Always follow your healthcare professional's instructions.           Patient Education     Coping with Ménière s Disease: Other Things You Can Do    Take care of your body and your emotions. Also, educate the people around you about your condition. Talk to your friends and family. The more they know about Ménière s disease, the easier it will be for them to understand what you re going through and to offer help when they can.   Having a loved one with a chronic illness can be challenging. Your loved one will have to make changes in his or her life. Your own life may change as well. To make the transition easier, try the tips below:     Communicate. Talk about your feelings and concerns, and encourage your loved one to do the same. The more you communicate the fewer misunderstandings you re likely to have.    Support. Offer your loved one the emotional and physical support he or she needs. But don't be overprotective or controlling.    Learn. Read and ask as much as you can about Ménière s disease. Understanding will help you better cope with your loved one s illness. It will also  help you offer appropriate physical and emotional support.  Take care of your body  A healthy body can help you better cope with the challenges of Ménière s disease. In general, eat right and get plenty of sleep. Ask your healthcare provider about a low-salt diet, a water pill (diuretic), or other treatments for the condition.   It's important to keep active by getting regular exercise, but don't get too tired. Exercise can help loosen and strengthen your muscles and keep your body as healthy as possible. If you need them, your doctor may also recommend balance (vestibular) exercises. These exercises may be taught in your doctor s office or by a physical therapist. They re designed to help improve your balance and coordination and lessen dizziness. Consider wearing a medical alert bracelet or necklace.   Take care of your emotions  While learning to cope with chronic illness, you may find you have periods of depression, frustration, and fear. These are all normal feelings. Give yourself time to adjust. You can live well and cope with Ménière s disease. But if feelings of depression get worse, don't wait to seek professional help if needed. Stay active--don t let Ménière s disease stop you from living a full, enjoyable life. And stay close to family and friends. Tell them how you re feeling and how they can help you. Explain what they can do during an attack. Having the information will make family and friends feel better able to help and support you. Also spend time with them doing things you enjoy.   [employer] last reviewed this educational content on 9/1/2019 2000-2020 [employer] [address] [place] [address], [place] [address]. All rights reserved. This information is not intended as a substitute for professional medical care. Always follow your healthcare professional's instructions.           Patient Education     Treating Ménière s Disease: Eating a Low-Salt Diet    A common way to combat Ménière s  disease is to eat less salt. Salt contains sodium. Sodium makes your body hold extra fluid. Because Ménière s disease is caused by a buildup of fluid in the inner ear, eating less sodium may help ease your symptoms.   Sodium is one of the main ingredients in table salt. It's found naturally in most foods. But most of the sodium you eat is added to your food when it is made in a factory or restaurant. So it s easy to get more than you need. How do you know how much sodium a food has in it? Food nutrition labels are a good place to start. Food nutrition labels show how much sodium, cholesterol, dietary fiber, and calories are in each serving. They will also tell you how many servings are in each container, as well as how many different types of fat is in the food.   A common recommendation is to limit sodium to no more than 1,500 mg to 2,000 mg each day. This is the amount of sodium in about 1 teaspoon of table salt. Your doctor can tell you how many milligrams (mg) of sodium are OK to eat each day.   Tips for eating less salt    Don t add salt to food when you re cooking. Season foods with flavorings such as lemon, pepper, garlic, onion, and dried herbs instead.    Take the saltshaker off the table. Replace it with salt-free herb mixes, spices, and salt substitutes.    Get a cookbook of low-salt recipes. It can give you ideas for healthy and great-tasting meals.    Choose low-salt snacks such as no-salt pretzels or crackers, air-popped popcorn, or low-fat frozen yogurt.    Don't use condiments high in sodium. These include mustard, relish, ketchup, soy sauce, and Worcestershire sauce.    When you eat out, ask that your food be cooked without added salt. Watch out for fast foods. These are often heavily salted.    Read labels before buying any food that isn t fresh. Check the label for the milligrams of sodium in each serving. Watch out for high-sodium ingredients such as baking soda and saccharin.    Don't eat foods  that are pickled, smoked, or packed in brine or broth.    If you buy antacid tablets, choose a sodium-free brand.    Certain toothpastes, mouthwashes, and medicines may have salt added. Ask your pharmacist to recommend low-salt substitutes.    [employer] last reviewed this educational content on 9/1/2019 2000-2020 [employer]. [address] [place] [address], [place] [address]. All rights reserved. This information is not intended as a substitute for professional medical care. Always follow your healthcare professional's instructions.           Patient Education     Treating Ménière s Disease: Lifestyle Changes     Treatment may include maskers and hearing aids.   Certain changes may help you manage Ménière s disease. They include avoiding certain substances. Special devices may also help make you more comfortable and improve your hearing.   Stay away from certain substances  Certain substances affect how your body regulates fluid, and can make Ménière s disease worse. These include:     Caffeine. Caffeine narrows your blood vessels. This reduces blood flow to your inner ear. Stay away from drinks and foods that are high in caffeine, such as coffee, cola, and chocolate.    Alcohol. Alcohol can upset your sense of balance. Don't drink alcohol, or limit it to very small amounts.    Tobacco smoke. Tobacco smoke narrows your blood vessels, weakens your immune system, and harms your general health. By affecting your circulation, smoking may contribute to Ménière s symptoms. Quitting smoking is always a good idea.  Maintain a healthy lifestyle    Get regular sleep.    Eat a nutritious, low-salt diet. Limit the amount of salt (sodium) you eat to between 1,500 mg and 2,000 mg per day, or as advised by your healthcare provider.    Get regular exercise, but don't get too tired.    Explore helpful devices  If Ménière s disease has permanently affected your hearing, a hearing aid may help you hear better. Hearing aids  come in many different models. You can find one that's best for your needs and lifestyle. Other devices can help cover up tinnitus. A fan or a radio tuned to music or static, or a white-noise device specifically designed to create background noise may help. A masking device that makes white noise all the time can be worn directly in the ear. Ask your healthcare provider if these devices are right for you.   Pay attention to your body  People with Ménière s disease sometimes find that such things as bright lights, loud noises, or very low sounds bring on symptoms or make the symptoms worse. Pay attention to how you feel. If something makes you feel worse, talk with your healthcare provider. Also talk with your healthcare provider if you have any questions.   [employer] last reviewed this educational content on 9/1/2019 2000-2020 [employer]. [address] [place] [address], [place] [address]. All rights reserved. This information is not intended as a substitute for professional medical care. Always follow your healthcare professional's instructions.

## 2021-01-25 NOTE — PROGRESS NOTES
I am seeing this patient in consultation for tinnitus, dizziness at the request of the provider Max Orantes.    Chief Complaint - Dizziness    History of Present Illness - Cooper Vargas is a 65 year old male who presents with dizziness. The patient describes being off-balance and movement.  It lasts for all day and doesn't stop. This has been going on for 7 weeks. He notes unsteadiness with walking. The patient notes some nausea. Some blurry vision occasionally. However, dizziness never goes away. Even lying down he feels it. No headaches. No exacerbating factors. However, driving with bumps make it worse. The patient has noted left ear symptoms. He has tinnitus that is new. I personally reviewed the relevant clinical notes in Epic including the primary care providers note. MRI brain has been ordered, but not scheduled. Former smoker, quit 6 years ago. Mother had vertigo. Patient tried dramamine. No help.     Past Medical History -   Patient Active Problem List   Diagnosis     GERD (gastroesophageal reflux disease)     Plantar fasciitis     Fatigue     Helicobacter pylori infection     Lyme disease     Smoking     Obesity     Tremors, hands     Hyperlipidemia LDL goal <100     LISETTE (obstructive sleep apnea)-Moderate (AHI 21)     Advanced directives, counseling/discussion     Acute gouty arthritis     Bronchitis, mucopurulent recurrent (H)     Mixed simple and mucopurulent chronic bronchitis (H)     Essential hypertension with goal blood pressure less than 140/90     Type 2 diabetes mellitus with microalbuminuria, without long-term current use of insulin (H)     Morbid obesity (H)     COPD exacerbation (H)       Current Medications -   Current Outpatient Medications:      amLODIPine (NORVASC) 10 MG tablet, Take 0.5 tablets (5 mg) by mouth daily, Disp: 90 tablet, Rfl: 1     ASPIRIN LOW DOSE 81 MG EC tablet, TAKE ONE TABLET BY MOUTH ONCE DAILY, Disp: 90 tablet, Rfl: 3     doxycycline hyclate (VIBRA-TABS) 100 MG  tablet, Take 1 tablet (100 mg) by mouth 2 times daily (Patient not taking: Reported on 2021), Disp: 28 tablet, Rfl: 0     losartan-hydrochlorothiazide (HYZAAR) 100-12.5 MG tablet, Take 1 tablet by mouth daily, Disp: 90 tablet, Rfl: 1     metFORMIN (GLUCOPHAGE) 500 MG tablet, TAKE ONE TABLET BY MOUTH TWICE A DAY WITH MEALS, Disp: 180 tablet, Rfl: 0     order for DME, Equipment being ordered: CPAP equipment all supplies especially hoses and nose guards., Disp: 1 each, Rfl: 1     predniSONE (DELTASONE) 20 MG tablet, Take 1 tablet (20 mg) by mouth 2 times daily (Patient not taking: Reported on 2021), Disp: 14 tablet, Rfl: 0     STATIN NOT PRESCRIBED, INTENTIONAL,, 1 each daily Please choose reason not prescribed, below, Disp: , Rfl:     Current Facility-Administered Medications:      ropivacaine (NAROPIN) injection 3 mL, 3 mL, , , Desmond Smiley DO, 3 mL at 10/21/20 0910     triamcinolone (KENALOG-40) injection 40 mg, 40 mg, , , Desmond Smiley DO, 40 mg at 10/21/20 0910    Allergies -   Allergies   Allergen Reactions     Penicillins        Social History -   Social History     Socioeconomic History     Marital status:      Spouse name: Jo     Number of children: 2     Years of education: 13     Highest education level: Not on file   Occupational History     Occupation: Sales     Employer: [employer]     Comment: Semi-retired, goes to work several days a week.  [employer] sensitive paper for lables   Social Needs     Financial resource strain: Not on file     Food insecurity     Worry: Not on file     Inability: Not on file     Transportation needs     Medical: Not on file     Non-medical: Not on file   Tobacco Use     Smoking status: Former Smoker     Packs/day: 1.00     Years: 42.00     Pack years: 42.00     Types: Cigarettes     Quit date: 2015     Years since quittin.3     Smokeless tobacco: Never Used   Substance and Sexual Activity     Alcohol use: Yes      Alcohol/week: 0.0 standard drinks     Comment: social drinks on the weekends.12 drinks (imelda)weekly     Drug use: No     Sexual activity: Yes     Partners: Female   Lifestyle     Physical activity     Days per week: Not on file     Minutes per session: Not on file     Stress: Not on file   Relationships     Social connections     Talks on phone: Not on file     Gets together: Not on file     Attends Voodoo service: Not on file     Active member of club or organization: Not on file     Attends meetings of clubs or organizations: Not on file     Relationship status: Not on file     Intimate partner violence     Fear of current or ex partner: Not on file     Emotionally abused: Not on file     Physically abused: Not on file     Forced sexual activity: Not on file   Other Topics Concern     Parent/sibling w/ CABG, MI or angioplasty before 65F 55M? Not Asked   Social History Narrative     Not on file       Family History -   Family History   Problem Relation Age of Onset     Diabetes Paternal Grandmother      Alzheimer Disease Paternal Grandmother      Neurologic Disorder Sister         migraines     Neurologic Disorder Son         Shaking of hands. Age 30 years       Review of Systems - As per HPI and PMHx, otherwise 10+ comprehensive system review is negative.    Physical Exam  General - The patient is in no distress.  Alert and oriented x3, answers questions and cooperates with examination appropriately.   Voice and Breathing - The patient was breathing comfortably without the use of accessory muscles. There was no wheezing, stridor, or stertor.  The patients voice was clear and strong, with no dysphonia.    Eyes - Pupils are reactive to light. Extraocular movements intact. Sclera were not icteric or injected, conjunctiva were pink and moist. No nystagmus.  Ears - The auricles appeared normal. The external auditory canals were nonedematous and nonerythematous. The tympanic membranes are normal in appearance, bony  landmarks are intact.  No retraction, perforation, or masses.  No fluid or purulence was seen in the external canal or the middle ear.   Neurologic - Cranial nerves II-XII are grossly intact. Specifically, the facial nerve is intact, House-Brackmann grade 1 of 6. Finger-nose-finger is normal. Romberg with sway. slow gait.   Mouth - Examination of the oral cavity showed pink, healthy oral mucosa. No lesions or ulcerations noted.  The tongue was mobile and protrudes midline.   Oropharynx - The walls of the oropharynx were smooth, pink, moist, symmetric, and had no lesions or ulcerations. The uvula was midline and the palate raised symmetrically.   Neck -  Soft, nontender. Palpation of the occipital, submental, submandibular, internal jugular chain, and supraclavicular nodes did not demonstrate any abnormal lymph nodes or masses. The parotid glands were without masses. Palpation of the thyroid was soft and smooth, with no nodules or goiter appreciated.  The trachea was midline.  Cardiovascular - carotid pulses are 2+ bilaterally, regular rhythm      Audiologic Studies - An audiogram and tympanogram were performed today as part of the evaluation and personally reviewed. The tympanogram shows normal Type A curves, with normal canal volumes and middle ear pressures. The audiogram was mild down-sloping sensorineural hearing loss right, moderate down-sloping sensorineural hearing loss left.      A/P - Cooper Vargas is a 65 year old male with dizziness and left asymmetric sensorineural hearing loss. This was sudden, and he is 7 weeks out from the start of this. The dizziness is worsening, not improving, so I think labyrinthitis is less likely.  He is unsteady with Romberg. So this maybe something else. I recommend MRI brain to r/o retrocochlear pathology. I will call with results and next steps.       Elie Santos MD  Otolaryngology  [facility]

## 2021-01-26 ENCOUNTER — OFFICE VISIT (OUTPATIENT)
Dept: AUDIOLOGY | Facility: CLINIC | Age: 66
End: 2021-01-26
Payer: COMMERCIAL

## 2021-01-26 ENCOUNTER — OFFICE VISIT (OUTPATIENT)
Dept: OTOLARYNGOLOGY | Facility: CLINIC | Age: 66
End: 2021-01-26
Payer: COMMERCIAL

## 2021-01-26 DIAGNOSIS — R42 VERTIGO: Primary | ICD-10-CM

## 2021-01-26 DIAGNOSIS — H90.3 SENSORINEURAL HEARING LOSS, BILATERAL: Primary | ICD-10-CM

## 2021-01-26 DIAGNOSIS — R42 DIZZINESS: ICD-10-CM

## 2021-01-26 DIAGNOSIS — H90.3 SNHL (SENSORY-NEURAL HEARING LOSS), ASYMMETRICAL: ICD-10-CM

## 2021-01-26 PROCEDURE — 99207 PR NO CHARGE LOS: CPT | Performed by: AUDIOLOGIST

## 2021-01-26 PROCEDURE — 99243 OFF/OP CNSLTJ NEW/EST LOW 30: CPT | Performed by: OTOLARYNGOLOGY

## 2021-01-26 PROCEDURE — 92557 COMPREHENSIVE HEARING TEST: CPT | Performed by: AUDIOLOGIST

## 2021-01-26 PROCEDURE — 92550 TYMPANOMETRY & REFLEX THRESH: CPT | Performed by: AUDIOLOGIST

## 2021-01-26 NOTE — PROGRESS NOTES
AUDIOLOGY REPORT:    Patient was referred from ENT by Dr. Santos for audiology evaluation. The patient reports constant dizziness. He also reports a plugged sensation in both ears and constant buzzing tinnitus that is worse in the left ear than the right. He notes that it varies in intensity. The patient reports no major changes in hearing. He reports a history of loud noise exposure from power tools without hearing protection. The patient reports that he has no family history of hearing loss.    Testing:    Otoscopy:   Otoscopic exam indicates ears are clear of cerumen bilaterally     Tympanograms:    RIGHT: normal eardrum mobility     LEFT:   normal eardrum mobility    Reflexes (reported by stimulus ear):  RIGHT: Ipsilateral is present at normal levels  RIGHT: Contralateral is present at normal levels  LEFT:   Ipsilateral is present at normal levels  LEFT:   Contralateral is present at normal levels    Thresholds:   Pure Tone Thresholds assessed using conventional audiometry with good reliability from 250-8000 Hz bilaterally using insert earphones and circumaural headphones     RIGHT:  normal hearing sensitivity through 500 Hz sloping to mild sensorineural hearing loss    LEFT:    normal hearing sensitivity through 500 Hz sloping to mild to moderately severe sensorineural hearing loss  NOTE: significant asymmetry at 2816-3707 Hz with the left ear poorer    Speech Reception Threshold:    RIGHT: 30 dB HL    LEFT:   35 dB HL  Results are in agreement with pure tone average.     Word Recognition Score:     RIGHT: 92% at 70 dB HL using NU-6 recorded word list.    LEFT:   88% at 80 dB HL using NU-6 recorded word list.    Discussed results with the patient.     Patient was returned to ENT for follow up.     Selina Jett, CCC-A  Licensed Audiologist #[id number]  1/26/2021

## 2021-01-26 NOTE — LETTER
1/26/2021         RE: Cooper Vargas  [id number] [address]  [place] [address]        Dear Colleague,    Thank you for referring your patient, Cooper Vargas, to the [facility]. Please see a copy of my visit note below.    I am seeing this patient in consultation for tinnitus, dizziness at the request of the provider Max Orantes.    Chief Complaint - Dizziness    History of Present Illness - Cooper Vargas is a 65 year old male who presents with dizziness. The patient describes being off-balance and movement.  It lasts for all day and doesn't stop. This has been going on for 7 weeks. He notes unsteadiness with walking. The patient notes some nausea. Some blurry vision occasionally. However, dizziness never goes away. Even lying down he feels it. No headaches. No exacerbating factors. However, driving with bumps make it worse. The patient has noted left ear symptoms. He has tinnitus that is new. I personally reviewed the relevant clinical notes in Epic including the primary care providers note. MRI brain has been ordered, but not scheduled. Former smoker, quit 6 years ago. Mother had vertigo. Patient tried dramamine. No help.     Past Medical History -   Patient Active Problem List   Diagnosis     GERD (gastroesophageal reflux disease)     Plantar fasciitis     Fatigue     Helicobacter pylori infection     Lyme disease     Smoking     Obesity     Tremors, hands     Hyperlipidemia LDL goal <100     LISETTE (obstructive sleep apnea)-Moderate (AHI 21)     Advanced directives, counseling/discussion     Acute gouty arthritis     Bronchitis, mucopurulent recurrent (H)     Mixed simple and mucopurulent chronic bronchitis (H)     Essential hypertension with goal blood pressure less than 140/90     Type 2 diabetes mellitus with microalbuminuria, without long-term current use of insulin (H)     Morbid obesity (H)     COPD exacerbation (H)       Current Medications -   Current Outpatient Medications:       amLODIPine (NORVASC) 10 MG tablet, Take 0.5 tablets (5 mg) by mouth daily, Disp: 90 tablet, Rfl: 1     ASPIRIN LOW DOSE 81 MG EC tablet, TAKE ONE TABLET BY MOUTH ONCE DAILY, Disp: 90 tablet, Rfl: 3     doxycycline hyclate (VIBRA-TABS) 100 MG tablet, Take 1 tablet (100 mg) by mouth 2 times daily (Patient not taking: Reported on 1/18/2021), Disp: 28 tablet, Rfl: 0     losartan-hydrochlorothiazide (HYZAAR) 100-12.5 MG tablet, Take 1 tablet by mouth daily, Disp: 90 tablet, Rfl: 1     metFORMIN (GLUCOPHAGE) 500 MG tablet, TAKE ONE TABLET BY MOUTH TWICE A DAY WITH MEALS, Disp: 180 tablet, Rfl: 0     order for DME, Equipment being ordered: CPAP equipment all supplies especially hoses and nose guards., Disp: 1 each, Rfl: 1     predniSONE (DELTASONE) 20 MG tablet, Take 1 tablet (20 mg) by mouth 2 times daily (Patient not taking: Reported on 1/18/2021), Disp: 14 tablet, Rfl: 0     STATIN NOT PRESCRIBED, INTENTIONAL,, 1 each daily Please choose reason not prescribed, below, Disp: , Rfl:     Current Facility-Administered Medications:      ropivacaine (NAROPIN) injection 3 mL, 3 mL, , , Desmond Smiley DO, 3 mL at 10/21/20 0910     triamcinolone (KENALOG-40) injection 40 mg, 40 mg, , , Desmond Smiley DO, 40 mg at 10/21/20 0910    Allergies -   Allergies   Allergen Reactions     Penicillins        Social History -   Social History     Socioeconomic History     Marital status:      Spouse name: Jo     Number of children: 2     Years of education: 13     Highest education level: Not on file   Occupational History     Occupation: Sales     Employer: [employer]     Comment: Semi-retired, goes to work several days a week.  [employer] pressures sensitive paper for lables   Social Needs     Financial resource strain: Not on file     Food insecurity     Worry: Not on file     Inability: Not on file     Transportation needs     Medical: Not on file     Non-medical: Not on file   Tobacco Use     Smoking status:  Former Smoker     Packs/day: 1.00     Years: 42.00     Pack years: 42.00     Types: Cigarettes     Quit date: 2015     Years since quittin.3     Smokeless tobacco: Never Used   Substance and Sexual Activity     Alcohol use: Yes     Alcohol/week: 0.0 standard drinks     Comment: social drinks on the weekends.12 drinks (imelda)weekly     Drug use: No     Sexual activity: Yes     Partners: Female   Lifestyle     Physical activity     Days per week: Not on file     Minutes per session: Not on file     Stress: Not on file   Relationships     Social connections     Talks on phone: Not on file     Gets together: Not on file     Attends Anabaptism service: Not on file     Active member of club or organization: Not on file     Attends meetings of clubs or organizations: Not on file     Relationship status: Not on file     Intimate partner violence     Fear of current or ex partner: Not on file     Emotionally abused: Not on file     Physically abused: Not on file     Forced sexual activity: Not on file   Other Topics Concern     Parent/sibling w/ CABG, MI or angioplasty before 65F 55M? Not Asked   Social History Narrative     Not on file       Family History -   Family History   Problem Relation Age of Onset     Diabetes Paternal Grandmother      Alzheimer Disease Paternal Grandmother      Neurologic Disorder Sister         migraines     Neurologic Disorder Son         Shaking of hands. Age 30 years       Review of Systems - As per HPI and PMHx, otherwise 10+ comprehensive system review is negative.    Physical Exam  General - The patient is in no distress.  Alert and oriented x3, answers questions and cooperates with examination appropriately.   Voice and Breathing - The patient was breathing comfortably without the use of accessory muscles. There was no wheezing, stridor, or stertor.  The patients voice was clear and strong, with no dysphonia.    Eyes - Pupils are reactive to light. Extraocular movements intact.  Sclera were not icteric or injected, conjunctiva were pink and moist. No nystagmus.  Ears - The auricles appeared normal. The external auditory canals were nonedematous and nonerythematous. The tympanic membranes are normal in appearance, bony landmarks are intact.  No retraction, perforation, or masses.  No fluid or purulence was seen in the external canal or the middle ear.   Neurologic - Cranial nerves II-XII are grossly intact. Specifically, the facial nerve is intact, House-Brackmann grade 1 of 6. Finger-nose-finger is normal. Romberg with sway. slow gait.   Mouth - Examination of the oral cavity showed pink, healthy oral mucosa. No lesions or ulcerations noted.  The tongue was mobile and protrudes midline.   Oropharynx - The walls of the oropharynx were smooth, pink, moist, symmetric, and had no lesions or ulcerations. The uvula was midline and the palate raised symmetrically.   Neck -  Soft, nontender. Palpation of the occipital, submental, submandibular, internal jugular chain, and supraclavicular nodes did not demonstrate any abnormal lymph nodes or masses. The parotid glands were without masses. Palpation of the thyroid was soft and smooth, with no nodules or goiter appreciated.  The trachea was midline.  Cardiovascular - carotid pulses are 2+ bilaterally, regular rhythm      Audiologic Studies - An audiogram and tympanogram were performed today as part of the evaluation and personally reviewed. The tympanogram shows normal Type A curves, with normal canal volumes and middle ear pressures. The audiogram was mild down-sloping sensorineural hearing loss right, moderate down-sloping sensorineural hearing loss left.      A/P - Cooper Vargas is a 65 year old male with dizziness and left asymmetric sensorineural hearing loss. This was sudden, and he is 7 weeks out from the start of this. The dizziness is worsening, not improving, so I think labyrinthitis is less likely.  He is unsteady with Romberg. So this  maybe something else. I recommend MRI brain to r/o retrocochlear pathology. I will call with results and next steps.       Elie Santos MD  Otolaryngology  [facility]        Again, thank you for allowing me to participate in the care of your patient.        Sincerely,        Elie Santos MD

## 2021-02-02 ENCOUNTER — TELEPHONE (OUTPATIENT)
Dept: FAMILY MEDICINE | Facility: CLINIC | Age: 66
End: 2021-02-02

## 2021-02-02 ENCOUNTER — ANCILLARY PROCEDURE (OUTPATIENT)
Dept: MRI IMAGING | Facility: CLINIC | Age: 66
End: 2021-02-02
Attending: PHYSICIAN ASSISTANT
Payer: COMMERCIAL

## 2021-02-02 DIAGNOSIS — H91.93 CHANGE IN HEARING, BILATERAL: ICD-10-CM

## 2021-02-02 DIAGNOSIS — H93.13 TINNITUS, BILATERAL: ICD-10-CM

## 2021-02-02 DIAGNOSIS — R42 DIZZINESS: ICD-10-CM

## 2021-02-02 PROCEDURE — A9585 GADOBUTROL INJECTION: HCPCS | Performed by: RADIOLOGY

## 2021-02-02 PROCEDURE — 70553 MRI BRAIN STEM W/O & W/DYE: CPT | Mod: TC | Performed by: RADIOLOGY

## 2021-02-02 RX ORDER — GADOBUTROL 604.72 MG/ML
15 INJECTION INTRAVENOUS ONCE
Status: COMPLETED | OUTPATIENT
Start: 2021-02-02 | End: 2021-02-02

## 2021-02-02 RX ADMIN — GADOBUTROL 13.5 ML: 604.72 INJECTION INTRAVENOUS at 10:39

## 2021-02-03 DIAGNOSIS — R42 VERTIGO: Primary | ICD-10-CM

## 2021-02-03 NOTE — PROGRESS NOTES
MRI brain shows no retrocochlear pathology. I recommend PT with Epley as he has times when he moves head and he gets acutely worse. If this fails, he may need more formal vestibular testing.

## 2021-02-05 ENCOUNTER — TELEPHONE (OUTPATIENT)
Dept: FAMILY MEDICINE | Facility: CLINIC | Age: 66
End: 2021-02-05

## 2021-02-05 ENCOUNTER — VIRTUAL VISIT (OUTPATIENT)
Dept: FAMILY MEDICINE | Facility: CLINIC | Age: 66
End: 2021-02-05
Payer: COMMERCIAL

## 2021-02-05 DIAGNOSIS — R42 DIZZINESS: Primary | ICD-10-CM

## 2021-02-05 DIAGNOSIS — R80.9 TYPE 2 DIABETES MELLITUS WITH MICROALBUMINURIA, WITHOUT LONG-TERM CURRENT USE OF INSULIN (H): ICD-10-CM

## 2021-02-05 DIAGNOSIS — E11.29 TYPE 2 DIABETES MELLITUS WITH MICROALBUMINURIA, WITHOUT LONG-TERM CURRENT USE OF INSULIN (H): ICD-10-CM

## 2021-02-05 DIAGNOSIS — H93.13 TINNITUS, BILATERAL: ICD-10-CM

## 2021-02-05 PROCEDURE — 99213 OFFICE O/P EST LOW 20 MIN: CPT | Mod: 95 | Performed by: PHYSICIAN ASSISTANT

## 2021-02-05 NOTE — PROGRESS NOTES
Stephon is a 65 year old who is being evaluated via a billable telephone visit.      What phone number would you like to be contacted at? [phone]  How would you like to obtain your AVS? Mail a copy      Subjective     Stephon is a 65 year old who presents to clinic today for the following health issues     HPI       Diabetes Follow-up      How often are you checking your blood sugar? Not at all    What concerns do you have today about your diabetes? None     Do you have any of these symptoms? (Select all that apply)  Numbness in feet, Burning in feet, Excessive thirst, Blurry vision and Weight gain    Have you had a diabetic eye exam in the last 12 months? No    a1c was elevated to 7.5%. was forgetting his evening metformin 50% of the time and eating poorly over the holidays.    Dizziness and tinnitus improving some. Mri showed no concerning findings.      BP Readings from Last 2 Encounters:   01/18/21 129/81   10/21/20 118/76     Hemoglobin A1C (%)   Date Value   01/18/2021 7.5 (H)   08/26/2020 6.6 (H)     LDL Cholesterol Calculated (mg/dL)   Date Value   08/26/2020 139 (H)   05/15/2019 127 (H)           How many servings of fruits and vegetables do you eat daily?  0-1    On average, how many sweetened beverages do you drink each day (Examples: soda, juice, sweet tea, etc.  Do NOT count diet or artificially sweetened beverages)?   0    How many days per week do you exercise enough to make your heart beat faster? 3 or less    How many minutes a day do you exercise enough to make your heart beat faster? 9 or less    How many days per week do you miss taking your medication? 0        Review of Systems   Constitutional, HEENT, cardiovascular, pulmonary, GI, , musculoskeletal, neuro, skin, endocrine and psych systems are negative, except as otherwise noted.      Objective           Vitals:  No vitals were obtained today due to virtual visit.    Physical Exam   healthy, alert and no distress  PSYCH: Alert and oriented  times 3; coherent speech, normal   rate and volume, able to articulate logical thoughts, able   to abstract reason, no tangential thoughts, no hallucinations   or delusions  His affect is normal  RESP: No cough, no audible wheezing, able to talk in full sentences  Remainder of exam unable to be completed due to telephone visits    Cooper was seen today for diabetes.    Diagnoses and all orders for this visit:    Dizziness    Type 2 diabetes mellitus with microalbuminuria, without long-term current use of insulin (H)    Tinnitus, bilateral      Work on metformin compliance . work on lifestyle modification  Recheck dm in 3 mos.  Patient starting physical therapy for his vertigo.        Phone call duration: 12 minutes

## 2021-02-05 NOTE — TELEPHONE ENCOUNTER
Med list up dated.  See results note: patient notified   Max Orantes PA-C   2/5/2021  6:59 AM CST      Let Stephon know his a1c came back a little elevated. At this time I want him to increase his metformin to two tabs twice a day. Also have him work on a lower sugar diet and consistent exercise.  We'll recheck his diabetes in 3 mos.  Lets see what Dr Santos (ENT) wants to do next regarding Stephon's dizziness.      Max Ramirez RN  [facility]

## 2021-02-10 ENCOUNTER — HOSPITAL ENCOUNTER (OUTPATIENT)
Dept: PHYSICAL THERAPY | Facility: CLINIC | Age: 66
Setting detail: THERAPIES SERIES
End: 2021-02-10
Attending: OTOLARYNGOLOGY
Payer: COMMERCIAL

## 2021-02-10 DIAGNOSIS — R42 VERTIGO: ICD-10-CM

## 2021-02-10 PROCEDURE — 97161 PT EVAL LOW COMPLEX 20 MIN: CPT | Mod: GP | Performed by: PHYSICAL THERAPIST

## 2021-02-10 PROCEDURE — 97112 NEUROMUSCULAR REEDUCATION: CPT | Mod: GP | Performed by: PHYSICAL THERAPIST

## 2021-02-10 NOTE — PROGRESS NOTES
Cooper Vargas  1955 Physical Therapy Initial Evaluation  02/10/21 1000   Quick Adds   Quick Adds Vestibular Eval   Type of Visit Initial OP PT Evaluation   General Information   Start of Care Date 02/10/21   Referring Physician Elie Santos MD   Orders Evaluate and Treat as Indicated   Additional Orders Epley for BPPV   Order Date 02/03/21   Medical Diagnosis Vertigo   Precautions/Limitations no known precautions/limitations   Surgical/Medical history reviewed Yes   Pertinent history of current vestibular problem (include personal factors and/or comorbidities that impact the POC)  Hearing loss   Pertinent history of current problem (include personal factors and/or comorbidities that impact the POC) Having dizziness for a couple of months. Having constant dizziness. Dizziness feels like he's had a lot to drink. No spinning sensation. Nothing makes the symptoms worse or better. Also has a constant buzzing in the left ear as well. Was on both sides but now is just in left side. Had an MRI which was negative. / Comorbidities - Bronchitis, COPD, Diabetes mellitus type 2, Smoking, Lyme disease    Prior level of function comment Independent in ADLs   Patient role/Employment history Employed  (Sales)   Living environment [facility]   Fall Risk Screen   Fall screen completed by PT   Have you fallen 2 or more times in the past year? No   Have you fallen and had an injury in the past year? No   Is patient a fall risk? No   Abuse Screen (yes response referral indicated)   Feels Unsafe at Home or Work/School no   Feels Threatened by Someone no   Does Anyone Try to Keep You From Having Contact with Others or Doing Things Outside Your Home? no   Physical Signs of Abuse Present no   System Outcome Measures   Dizziness Handicap Inventory (score out of 100) A decrease in score by 17.18 or greater indicates a clinically significant change in symptoms. 40   Balance Special Tests   Balance Special Tests Modified CTSIB  Conditions   Balance Special Tests Modified CTSIB Conditions   Condition 1, seconds 30 Seconds   Condition 2, seconds 30 Seconds   Condition 4, seconds 30 Seconds   Condition 5, seconds 2 Seconds   Cervicogenic Screen   Vertebral Artery Test Normal   Alar Ligament Test Normal   Transverse Ligament Test Normal   Oculomotor Exam   Smooth Pursuit Normal   Saccades Normal   VOR Normal   VOR Cancellation Normal   Rapid Head Thrust Normal   Convergence Testing Normal   Infrared Goggle Exam or Frenzel Lense Exam   Vestibular Suppressant in Last 24 Hours? No   Exam completed with Room Light (Infrared goggles and laptop not working at today's visit)   Spontaneous Nystagmus Negative   Gaze Evoked Nystagmus Negative   Araceli-Hallpike (right)   (Pt report of dizziness. No nystagmus seen)   Araceli-Hallpike (Left)   (Pt report of symptoms, no nystagmus seen)   HSCC Supine Roll Test (Right) Negative   HSCC Supine Roll Test (Left)   (Pt report of dizziness, but no nystagmus seen)   Dynamic Visual Acuity (DVA)   Static Acuity (LogMar) Line 10   Horizontal Head Movement at 2 Hz (LogMar) Line 10   Planned Therapy Interventions   Planned Therapy Interventions balance training;gait training;ROM;strengthening;stretching;neuromuscular re-education   Clinical Impression   Criteria for Skilled Therapeutic Interventions Met yes, treatment indicated   PT Diagnosis Vestibular hypofunction   Influenced by the following impairments Dizziness   Functional limitations due to impairments Difficulty driving, ambulating   Clinical Presentation Stable/Uncomplicated   Clinical Presentation Rationale Several comorbidities impacting PT / 1-2 body systems   Clinical Decision Making (Complexity) Low complexity   Therapy Frequency 1 time/week   Predicted Duration of Therapy Intervention (days/wks) 6 weeks   Risk & Benefits of therapy have been explained Yes   Patient, Family & other staff in agreement with plan of care Yes   Education Assessment   Preferred  Learning Style Listening;Reading;Demonstration;Pictures/video   Barriers to Learning No barriers   GOALS   PT Eval Goals 1;2;3   Goal 1   Goal Identifier HEP   Goal Description Pt will be independent in HEP in order to achieve and maintain long term goals.   Target Date 03/10/21   Goal 2   Goal Identifier Ambulation   Goal Description Pt will be able to ambulate around corners without losing balance.   Target Date 03/24/21   Goal 3   Goal Identifier Driving   Goal Description Pt will be able to drive his car with a minimal increase in dizziness 1-2/10.   Target Date 03/24/21   Total Evaluation Time   PT Eval, Low Complexity Minutes (09849) 25     Ignacio Choi, PT, DPT

## 2021-02-22 DIAGNOSIS — I10 ESSENTIAL HYPERTENSION WITH GOAL BLOOD PRESSURE LESS THAN 140/90: ICD-10-CM

## 2021-02-22 RX ORDER — LOSARTAN POTASSIUM AND HYDROCHLOROTHIAZIDE 12.5; 1 MG/1; MG/1
TABLET ORAL
Qty: 90 TABLET | Refills: 0 | Status: SHIPPED | OUTPATIENT
Start: 2021-02-22 | End: 2021-05-25

## 2021-02-22 NOTE — TELEPHONE ENCOUNTER
Prescription approved per [facility] Refill Protocol.    Due for Diabetic check in May, given 3 month supply.     Starla Moreno RN BSN  [facility]

## 2021-02-23 DIAGNOSIS — I10 ESSENTIAL HYPERTENSION WITH GOAL BLOOD PRESSURE LESS THAN 140/90: ICD-10-CM

## 2021-02-25 RX ORDER — LOSARTAN POTASSIUM AND HYDROCHLOROTHIAZIDE 12.5; 1 MG/1; MG/1
TABLET ORAL
Qty: 90 TABLET | Refills: 1 | OUTPATIENT
Start: 2021-02-25

## 2021-03-19 ENCOUNTER — DOCUMENTATION ONLY (OUTPATIENT)
Dept: PHYSICAL THERAPY | Facility: CLINIC | Age: 66
End: 2021-03-19

## 2021-03-19 NOTE — PROGRESS NOTES
Physical Therapy Discharge Note    Patient Name: Cooper Vargas  MR#: [id number]  : 1955  MD name: Elie Santos  Diagnosis: Vertigo  Eval date: 2/10/2021  Reporting period : 2/10/2021  Number of visits: 1    Subjective:  Patient attended eval only and did not return.  Pain scale and function unknown due to patient didn't return to PT    Objective:  Current objective measures unknown due to patient didn't return.  Treatment has consisted of Balance exercise education    Assessment:  Goals not met due to patient didn't return.     Plan:  Discharge with HEP.  Therapist: Ignacio Choi, PT, DPT

## 2021-05-14 DIAGNOSIS — R80.9 TYPE 2 DIABETES MELLITUS WITH MICROALBUMINURIA, WITHOUT LONG-TERM CURRENT USE OF INSULIN (H): ICD-10-CM

## 2021-05-14 DIAGNOSIS — E11.29 TYPE 2 DIABETES MELLITUS WITH MICROALBUMINURIA, WITHOUT LONG-TERM CURRENT USE OF INSULIN (H): ICD-10-CM

## 2021-05-18 NOTE — TELEPHONE ENCOUNTER
Prescription approved per [facility] Refill Protocol.    Starla Moreno RN BSN  [facility]

## 2021-05-23 DIAGNOSIS — I10 ESSENTIAL HYPERTENSION WITH GOAL BLOOD PRESSURE LESS THAN 140/90: ICD-10-CM

## 2021-05-24 ENCOUNTER — TELEPHONE (OUTPATIENT)
Dept: ORTHOPEDICS | Facility: CLINIC | Age: 66
End: 2021-05-24

## 2021-05-24 DIAGNOSIS — M19.012 PRIMARY OSTEOARTHRITIS OF LEFT SHOULDER: Primary | ICD-10-CM

## 2021-05-24 NOTE — TELEPHONE ENCOUNTER
[facility] Call Center    Phone Message    May a detailed message be left on voicemail: yes     Reason for Call: Other: Patient calling to clarify if he can receive an additonal cortizone injection. Patient unsure how often he can have those done. Please call back to discuss.     Action Taken: Message routed to:  [facility] ortho    Travel Screening: Not Applicable

## 2021-05-24 NOTE — TELEPHONE ENCOUNTER
Called and spoke to patient letting him know that he could have a cortisone injection no earlier than every 3 months. The doctor would prefer to wait as long as possible. I let him know that we would put another order in for another injection. It worked well. He thanked me for calling.  Tamanna Solis Certified Medical Assistant

## 2021-05-25 RX ORDER — LOSARTAN POTASSIUM AND HYDROCHLOROTHIAZIDE 12.5; 1 MG/1; MG/1
TABLET ORAL
Qty: 90 TABLET | Refills: 0 | Status: SHIPPED | OUTPATIENT
Start: 2021-05-25 | End: 2021-08-30

## 2021-05-25 NOTE — TELEPHONE ENCOUNTER
"Prescription approved per [facility] Refill Protocol.  Requested Prescriptions   Pending Prescriptions Disp Refills     losartan-hydrochlorothiazide (HYZAAR) 100-12.5 MG tablet [Pharmacy Med Name: LOSARTAN POTASSIUM--12.5 TABS] 90 tablet 0     Sig: TAKE ONE TABLET BY MOUTH ONCE DAILY       Angiotensin-II Receptors Passed - 5/23/2021  5:01 AM        Passed - Last blood pressure under 140/90 in past 12 months     BP Readings from Last 3 Encounters:   01/18/21 129/81   10/21/20 118/76   09/14/20 116/85                 Passed - Recent (12 mo) or future (30 days) visit within the authorizing provider's specialty     Patient has had an office visit with the authorizing provider or a provider within the authorizing providers department within the previous 12 mos or has a future within next 30 days. See \"Patient Info\" tab in inbasket, or \"Choose Columns\" in Meds & Orders section of the refill encounter.              Passed - Medication is active on med list        Passed - Patient is age 18 or older        Passed - Normal serum creatinine on file in past 12 months     Recent Labs   Lab Test 01/18/21  1134   CR 1.04       Ok to refill medication if creatinine is low          Passed - Normal serum potassium on file in past 12 months     Recent Labs   Lab Test 01/18/21  1134   POTASSIUM 4.1                   Diuretics (Including Combos) Protocol Passed - 5/23/2021  5:01 AM        Passed - Blood pressure under 140/90 in past 12 months     BP Readings from Last 3 Encounters:   01/18/21 129/81   10/21/20 118/76   09/14/20 116/85                 Passed - Recent (12 mo) or future (30 days) visit within the authorizing provider's specialty     Patient has had an office visit with the authorizing provider or a provider within the authorizing providers department within the previous 12 mos or has a future within next 30 days. See \"Patient Info\" tab in inbasket, or \"Choose Columns\" in Meds & Orders section of the refill encounter.  "             Passed - Medication is active on med list        Passed - Patient is age 18 or older        Passed - Normal serum creatinine on file in past 12 months     Recent Labs   Lab Test 01/18/21  1134   CR 1.04              Passed - Normal serum potassium on file in past 12 months     Recent Labs   Lab Test 01/18/21  1134   POTASSIUM 4.1                    Passed - Normal serum sodium on file in past 12 months     Recent Labs   Lab Test 01/18/21  1134

## 2021-06-09 ENCOUNTER — OFFICE VISIT (OUTPATIENT)
Dept: ORTHOPEDICS | Facility: CLINIC | Age: 66
End: 2021-06-09
Payer: COMMERCIAL

## 2021-06-09 VITALS — HEIGHT: 69 IN | WEIGHT: 295 LBS | BODY MASS INDEX: 43.69 KG/M2

## 2021-06-09 DIAGNOSIS — M19.012 PRIMARY OSTEOARTHRITIS OF LEFT SHOULDER: ICD-10-CM

## 2021-06-09 PROCEDURE — 20611 DRAIN/INJ JOINT/BURSA W/US: CPT | Mod: LT | Performed by: FAMILY MEDICINE

## 2021-06-09 RX ORDER — ROPIVACAINE HYDROCHLORIDE 5 MG/ML
3 INJECTION, SOLUTION EPIDURAL; INFILTRATION; PERINEURAL
Status: SHIPPED | OUTPATIENT
Start: 2021-06-09

## 2021-06-09 RX ORDER — TRIAMCINOLONE ACETONIDE 40 MG/ML
40 INJECTION, SUSPENSION INTRA-ARTICULAR; INTRAMUSCULAR
Status: SHIPPED | OUTPATIENT
Start: 2021-06-09

## 2021-06-09 RX ADMIN — ROPIVACAINE HYDROCHLORIDE 3 ML: 5 INJECTION, SOLUTION EPIDURAL; INFILTRATION; PERINEURAL at 11:50

## 2021-06-09 RX ADMIN — TRIAMCINOLONE ACETONIDE 40 MG: 40 INJECTION, SUSPENSION INTRA-ARTICULAR; INTRAMUSCULAR at 11:50

## 2021-06-09 ASSESSMENT — MIFFLIN-ST. JEOR: SCORE: 2123.49

## 2021-06-09 NOTE — PROGRESS NOTES
Cooper Vargas  :  1955  DOS: 21  MRN: [id number]    Sports Medicine Clinic Procedure    Ultrasound Guided Left Intra-Articular Shoulder Injection    Clinical History: Generalized aching left shoulder pain over the past ~ 3+ years after falling and landing on left shoulder.  Left subacromial steroid injection last completed 2019 that provided minimal relief.    Interim History - 2021  Since last visit on 10/21/20 patient has moderate-severe left shoulder pain over the past ~ 3 months.  Left shoulder glenohumeral injection completed on 10/21/20 provided good relief for ~ 4 months.  No new injury in the interim.    Diagnosis:   1. Primary osteoarthritis of left shoulder      Referring Physician: Dean Stahl MD  Large Joint Injection/Arthocentesis: L glenohumeral joint    Date/Time: 2021 11:50 AM  Performed by: Desmond Smiley DO  Authorized by: Desmond Smiley DO     Indications:  Pain and osteoarthritis  Needle Size:  21 G  Guidance: ultrasound    Approach:  Posterolateral  Location:  Shoulder      Site:  L glenohumeral joint  Medications:  3 mL ropivacaine 5 MG/ML; 40 mg triamcinolone 40 MG/ML  Outcome:  Tolerated well, no immediate complications  Procedure discussed: discussed risks, benefits, and alternatives    Consent Given by:  Patient  Timeout: timeout called immediately prior to procedure    Prep: patient was prepped and draped in usual sterile fashion          Impression:  Successful Left intra-articular shoulder injection.    Plan:  Follow up with Dr Stahl as directed   Expectations and goals of CSI reviewed  Often 2-3 days for steroid effect, and can take up to two weeks for maximum effect  We discussed modified progressive pain-free activity as tolerated  Do not overuse in first two weeks if feeling better due to concern for vulnerability while steroid is working  Supportive care reviewed  All questions were answered today  Contact us with additional  questions or concerns  Signs and sx of concern reviewed        Desmond Smiley DO, LUIS  Primary Care Sports Medicine  [facility]

## 2021-06-09 NOTE — LETTER
2021         RE: Cooper Vargas  [address] [address]  [place] [address]        Dear Colleague,    Thank you for referring your patient, Cooper Vargas, to the [facility] [place]. Please see a copy of my visit note below.    Cooper Vargas  :  1955  DOS: 21  MRN: [id number]    Sports Medicine Clinic Procedure    Ultrasound Guided Left Intra-Articular Shoulder Injection    Clinical History: Generalized aching left shoulder pain over the past ~ 3+ years after falling and landing on left shoulder.  Left subacromial steroid injection last completed 2019 that provided minimal relief.    Interim History - 2021  Since last visit on 10/21/20 patient has moderate-severe left shoulder pain over the past ~ 3 months.  Left shoulder glenohumeral injection completed on 10/21/20 provided good relief for ~ 4 months.  No new injury in the interim.    Diagnosis:   1. Primary osteoarthritis of left shoulder      Referring Physician: Dean Stahl MD  Large Joint Injection/Arthocentesis: L glenohumeral joint    Date/Time: 2021 11:50 AM  Performed by: Desmond Smiley DO  Authorized by: Desmond Smiley DO     Indications:  Pain and osteoarthritis  Needle Size:  21 G  Guidance: ultrasound    Approach:  Posterolateral  Location:  Shoulder      Site:  L glenohumeral joint  Medications:  3 mL ropivacaine 5 MG/ML; 40 mg triamcinolone 40 MG/ML  Outcome:  Tolerated well, no immediate complications  Procedure discussed: discussed risks, benefits, and alternatives    Consent Given by:  Patient  Timeout: timeout called immediately prior to procedure    Prep: patient was prepped and draped in usual sterile fashion          Impression:  Successful Left intra-articular shoulder injection.    Plan:  Follow up with Dr Stahl as directed   Expectations and goals of CSI reviewed  Often 2-3 days for steroid effect, and can take up to two weeks for maximum effect  We  discussed modified progressive pain-free activity as tolerated  Do not overuse in first two weeks if feeling better due to concern for vulnerability while steroid is working  Supportive care reviewed  All questions were answered today  Contact us with additional questions or concerns  Signs and sx of concern reviewed        Desmond Smiley DO, LUIS  Primary Care Sports Medicine  [facility]       Again, thank you for allowing me to participate in the care of your patient.        Sincerely,        Desmond Smiley DO

## 2021-08-28 ENCOUNTER — HEALTH MAINTENANCE LETTER (OUTPATIENT)
Age: 66
End: 2021-08-28

## 2021-09-14 ENCOUNTER — DOCUMENTATION ONLY (OUTPATIENT)
Dept: LAB | Facility: CLINIC | Age: 66
End: 2021-09-14

## 2021-09-14 DIAGNOSIS — Z12.5 SCREENING FOR PROSTATE CANCER: ICD-10-CM

## 2021-09-14 DIAGNOSIS — E78.5 HYPERLIPIDEMIA LDL GOAL <100: ICD-10-CM

## 2021-09-14 DIAGNOSIS — E11.29 TYPE 2 DIABETES MELLITUS WITH MICROALBUMINURIA, WITHOUT LONG-TERM CURRENT USE OF INSULIN (H): Primary | ICD-10-CM

## 2021-09-14 DIAGNOSIS — R80.9 TYPE 2 DIABETES MELLITUS WITH MICROALBUMINURIA, WITHOUT LONG-TERM CURRENT USE OF INSULIN (H): Primary | ICD-10-CM

## 2021-09-14 NOTE — PROGRESS NOTES
Patient has an upcoming lab appointment on 09/17/21 at 09:15 am. Please review and place future orders that may be needed. Otherwise please call patient to cancel lab appointment.    Thank you,  [facility] lab staff

## 2021-09-17 ENCOUNTER — LAB (OUTPATIENT)
Dept: LAB | Facility: CLINIC | Age: 66
End: 2021-09-17
Payer: COMMERCIAL

## 2021-09-17 DIAGNOSIS — E11.29 TYPE 2 DIABETES MELLITUS WITH MICROALBUMINURIA, WITHOUT LONG-TERM CURRENT USE OF INSULIN (H): ICD-10-CM

## 2021-09-17 DIAGNOSIS — R80.9 TYPE 2 DIABETES MELLITUS WITH MICROALBUMINURIA, WITHOUT LONG-TERM CURRENT USE OF INSULIN (H): ICD-10-CM

## 2021-09-17 DIAGNOSIS — E78.5 HYPERLIPIDEMIA LDL GOAL <100: ICD-10-CM

## 2021-09-17 DIAGNOSIS — Z12.5 SCREENING FOR PROSTATE CANCER: ICD-10-CM

## 2021-09-17 LAB
ANION GAP SERPL CALCULATED.3IONS-SCNC: 4 MMOL/L (ref 3–14)
BUN SERPL-MCNC: 13 MG/DL (ref 7–30)
CALCIUM SERPL-MCNC: 8.9 MG/DL (ref 8.5–10.1)
CHLORIDE BLD-SCNC: 107 MMOL/L (ref 94–109)
CHOLEST SERPL-MCNC: 250 MG/DL
CO2 SERPL-SCNC: 28 MMOL/L (ref 20–32)
CREAT SERPL-MCNC: 0.84 MG/DL (ref 0.66–1.25)
FASTING STATUS PATIENT QL REPORTED: YES
GFR SERPL CREATININE-BSD FRML MDRD: >90 ML/MIN/1.73M2
GLUCOSE BLD-MCNC: 120 MG/DL (ref 70–99)
HBA1C MFR BLD: 6.3 % (ref 0–5.6)
HDLC SERPL-MCNC: 56 MG/DL
LDLC SERPL CALC-MCNC: 147 MG/DL
NONHDLC SERPL-MCNC: 194 MG/DL
POTASSIUM BLD-SCNC: 4 MMOL/L (ref 3.4–5.3)
PSA SERPL-MCNC: 0.55 UG/L (ref 0–4)
SODIUM SERPL-SCNC: 139 MMOL/L (ref 133–144)
TRIGL SERPL-MCNC: 237 MG/DL

## 2021-09-17 PROCEDURE — 83036 HEMOGLOBIN GLYCOSYLATED A1C: CPT

## 2021-09-17 PROCEDURE — G0103 PSA SCREENING: HCPCS

## 2021-09-17 PROCEDURE — 80048 BASIC METABOLIC PNL TOTAL CA: CPT

## 2021-09-17 PROCEDURE — 36415 COLL VENOUS BLD VENIPUNCTURE: CPT

## 2021-09-17 PROCEDURE — 80061 LIPID PANEL: CPT

## 2021-09-23 ENCOUNTER — OFFICE VISIT (OUTPATIENT)
Dept: FAMILY MEDICINE | Facility: CLINIC | Age: 66
End: 2021-09-23
Payer: COMMERCIAL

## 2021-09-23 ENCOUNTER — ANCILLARY PROCEDURE (OUTPATIENT)
Dept: GENERAL RADIOLOGY | Facility: CLINIC | Age: 66
End: 2021-09-23
Attending: PHYSICIAN ASSISTANT
Payer: COMMERCIAL

## 2021-09-23 VITALS
TEMPERATURE: 98.8 F | OXYGEN SATURATION: 93 % | RESPIRATION RATE: 24 BRPM | WEIGHT: 288.6 LBS | HEART RATE: 76 BPM | SYSTOLIC BLOOD PRESSURE: 133 MMHG | DIASTOLIC BLOOD PRESSURE: 82 MMHG | HEIGHT: 69 IN | BODY MASS INDEX: 42.75 KG/M2

## 2021-09-23 DIAGNOSIS — I10 ESSENTIAL HYPERTENSION WITH GOAL BLOOD PRESSURE LESS THAN 140/90: ICD-10-CM

## 2021-09-23 DIAGNOSIS — R80.9 TYPE 2 DIABETES MELLITUS WITH MICROALBUMINURIA, WITHOUT LONG-TERM CURRENT USE OF INSULIN (H): ICD-10-CM

## 2021-09-23 DIAGNOSIS — Z00.00 ROUTINE GENERAL MEDICAL EXAMINATION AT A HEALTH CARE FACILITY: Primary | ICD-10-CM

## 2021-09-23 DIAGNOSIS — J44.1 COPD EXACERBATION (H): ICD-10-CM

## 2021-09-23 DIAGNOSIS — E11.29 TYPE 2 DIABETES MELLITUS WITH MICROALBUMINURIA, WITHOUT LONG-TERM CURRENT USE OF INSULIN (H): ICD-10-CM

## 2021-09-23 DIAGNOSIS — E66.01 MORBID OBESITY (H): ICD-10-CM

## 2021-09-23 DIAGNOSIS — E78.5 HYPERLIPIDEMIA LDL GOAL <130: ICD-10-CM

## 2021-09-23 PROCEDURE — 90471 IMMUNIZATION ADMIN: CPT | Performed by: PHYSICIAN ASSISTANT

## 2021-09-23 PROCEDURE — 99207 PR FOOT EXAM NO CHARGE: CPT | Mod: 25 | Performed by: PHYSICIAN ASSISTANT

## 2021-09-23 PROCEDURE — 71046 X-RAY EXAM CHEST 2 VIEWS: CPT | Performed by: RADIOLOGY

## 2021-09-23 PROCEDURE — 90682 RIV4 VACC RECOMBINANT DNA IM: CPT | Performed by: PHYSICIAN ASSISTANT

## 2021-09-23 PROCEDURE — 99214 OFFICE O/P EST MOD 30 MIN: CPT | Mod: 25 | Performed by: PHYSICIAN ASSISTANT

## 2021-09-23 PROCEDURE — 99396 PREV VISIT EST AGE 40-64: CPT | Mod: 25 | Performed by: PHYSICIAN ASSISTANT

## 2021-09-23 RX ORDER — DOXYCYCLINE HYCLATE 100 MG
100 TABLET ORAL 2 TIMES DAILY
Qty: 20 TABLET | Refills: 0 | Status: SHIPPED | OUTPATIENT
Start: 2021-09-23 | End: 2021-12-10

## 2021-09-23 RX ORDER — ALBUTEROL SULFATE 90 UG/1
2 AEROSOL, METERED RESPIRATORY (INHALATION) EVERY 6 HOURS
Qty: 18 G | Refills: 5 | Status: SHIPPED | OUTPATIENT
Start: 2021-09-23 | End: 2021-12-10

## 2021-09-23 RX ORDER — EZETIMIBE 10 MG/1
10 TABLET ORAL DAILY
Qty: 90 TABLET | Refills: 3 | Status: SHIPPED | OUTPATIENT
Start: 2021-09-23

## 2021-09-23 RX ORDER — PREDNISONE 10 MG/1
TABLET ORAL
Qty: 42 TABLET | Refills: 0 | Status: SHIPPED | OUTPATIENT
Start: 2021-09-23 | End: 2021-12-10

## 2021-09-23 ASSESSMENT — ENCOUNTER SYMPTOMS
FREQUENCY: 1
DYSURIA: 0
PARESTHESIAS: 0
JOINT SWELLING: 0
ARTHRALGIAS: 1
NERVOUS/ANXIOUS: 0
COUGH: 1
MYALGIAS: 0
SORE THROAT: 1
HEMATOCHEZIA: 0
PALPITATIONS: 0
ABDOMINAL PAIN: 0
CHILLS: 0
SHORTNESS OF BREATH: 1
CONSTIPATION: 0
NAUSEA: 0
HEMATURIA: 0
WEAKNESS: 1
HEADACHES: 1
DIARRHEA: 1
HEARTBURN: 0
EYE PAIN: 0
DIZZINESS: 1
FEVER: 0

## 2021-09-23 ASSESSMENT — MIFFLIN-ST. JEOR: SCORE: 2093.42

## 2021-09-23 ASSESSMENT — PAIN SCALES - GENERAL: PAINLEVEL: MODERATE PAIN (4)

## 2021-09-23 NOTE — PROGRESS NOTES
SUBJECTIVE:   CC: Cooper Vargas is an 66 year old male who presents for preventative health visit.       Patient has been advised of split billing requirements and indicates understanding: Yes  Healthy Habits:     Getting at least 3 servings of Calcium per day:  NO    Bi-annual eye exam:  NO    Dental care twice a year:  NO    Sleep apnea or symptoms of sleep apnea:  Sleep apnea    Diet:  Regular (no restrictions)    Frequency of exercise:  None    Taking medications regularly:  No    Medication side effects:  None    PHQ-2 Total Score: 0    Additional concerns today:  No      Concerns:  Dm. a1c was 6.3.  Reviewed lipids. Patient intolerant to statins. Will try him on zetia. Also discussed a Lower fat, higher fiber diet and consistent exercise to help improve lipids and help him lose weight.   Recheck of copd.  Noting sob and coughing.   Today's PHQ-2 Score:   PHQ-2 (  [employer]) 2021   Q1: Little interest or pleasure in doing things 0   Q2: Feeling down, depressed or hopeless 0   PHQ-2 Score 0   Q1: Little interest or pleasure in doing things Not at all   Q2: Feeling down, depressed or hopeless Not at all   PHQ-2 Score 0       Abuse: Current or Past(Physical, Sexual or Emotional)- No  Do you feel safe in your environment? Yes    Have you ever done Advance Care Planning? (For example, a Health Directive, POLST, or a discussion with a medical provider or your loved ones about your wishes): Yes, patient states has an Advance Care Planning document and will bring a copy to the clinic.    Social History     Tobacco Use     Smoking status: Former Smoker     Packs/day: 1.00     Years: 42.00     Pack years: 42.00     Types: Cigarettes     Quit date: 2015     Years since quittin.9     Smokeless tobacco: Never Used   Substance Use Topics     Alcohol use: Yes     Alcohol/week: 0.0 standard drinks     Comment: social drinks on the weekends.12 drinks (imelda)weekly         Alcohol Use 2021   Prescreen: >3  drinks/day or >7 drinks/week? No   Prescreen: >3 drinks/day or >7 drinks/week? -       Last PSA:   PSA   Date Value Ref Range Status   2020 0.57 0 - 4 ug/L Final     Comment:     Assay Method:  Chemiluminescence using Siemens Vista analyzer     Prostate Specific Antigen Screen   Date Value Ref Range Status   2021 0.55 0.00 - 4.00 ug/L Final       Reviewed orders with patient. Reviewed health maintenance and updated orders accordingly - Yes  Lab work is in process  Labs reviewed in EPIC  BP Readings from Last 3 Encounters:   21 133/82   21 129/81   10/21/20 118/76    Wt Readings from Last 3 Encounters:   21 130.9 kg (288 lb 9.6 oz)   21 133.8 kg (295 lb)   21 133.9 kg (295 lb 3.2 oz)                  Patient Active Problem List   Diagnosis     GERD (gastroesophageal reflux disease)     Plantar fasciitis     Fatigue     Helicobacter pylori infection     Lyme disease     Smoking     Obesity     Tremors, hands     Hyperlipidemia LDL goal <100     LISETTE (obstructive sleep apnea)-Moderate (AHI 21)     Advanced directives, counseling/discussion     Acute gouty arthritis     Bronchitis, mucopurulent recurrent (H)     Mixed simple and mucopurulent chronic bronchitis (H)     Essential hypertension with goal blood pressure less than 140/90     Type 2 diabetes mellitus with microalbuminuria, without long-term current use of insulin (H)     Morbid obesity (H)     COPD exacerbation (H)     Past Surgical History:   Procedure Laterality Date     KNEE SURGERY       ORTHOPEDIC SURGERY      Right knee     ORTHOPEDIC SURGERY  1970's    Right ankle     rib surgery  1982    Top right side rib removed       Social History     Tobacco Use     Smoking status: Former Smoker     Packs/day: 1.00     Years: 42.00     Pack years: 42.00     Types: Cigarettes     Quit date: 2015     Years since quittin.9     Smokeless tobacco: Never Used   Substance Use Topics     Alcohol use: Yes      Alcohol/week: 0.0 standard drinks     Comment: social drinks on the weekends.12 drinks (imelda)weekly     Family History   Problem Relation Age of Onset     Diabetes Paternal Grandmother      Alzheimer Disease Paternal Grandmother      Neurologic Disorder Sister         migraines     Neurologic Disorder Son         Shaking of hands. Age 30 years         Current Outpatient Medications   Medication Sig Dispense Refill     albuterol (PROAIR HFA/PROVENTIL HFA/VENTOLIN HFA) 108 (90 Base) MCG/ACT inhaler Inhale 2 puffs into the lungs every 6 hours 18 g 5     ALBUTEROL IN        amLODIPine (NORVASC) 10 MG tablet TAKE ONE-HALF TABLET BY MOUTH EVERY DAY 45 tablet 0     ASPIRIN LOW DOSE 81 MG EC tablet TAKE ONE TABLET BY MOUTH ONCE DAILY 90 tablet 3     doxycycline hyclate (VIBRA-TABS) 100 MG tablet Take 1 tablet (100 mg) by mouth 2 times daily 20 tablet 0     ezetimibe (ZETIA) 10 MG tablet Take 1 tablet (10 mg) by mouth daily 90 tablet 3     losartan-hydrochlorothiazide (HYZAAR) 100-12.5 MG tablet TAKE ONE TABLET BY MOUTH ONCE DAILY 90 tablet 0     metFORMIN (GLUCOPHAGE) 500 MG tablet Take 2 tablets (1,000 mg) by mouth 2 times daily (with meals) 240 tablet 0     predniSONE (DELTASONE) 10 MG tablet Take 60 mg days 1 and 2, 50 mg days 3 and 4, 40 mg days 5 and 6, 30 mg days 7 and 8, 20 mg days 9 and 10, 10 mg days 11 and 12 42 tablet 0     order for DME Equipment being ordered: CPAP equipment all supplies especially hoses and nose guards. 1 each 1     STATIN NOT PRESCRIBED, INTENTIONAL, 1 each daily Please choose reason not prescribed, below       Allergies   Allergen Reactions     Pcn [Penicillins]      Recent Labs   Lab Test 09/17/21  0915 01/18/21  1134 08/26/20  0836 08/26/20  0836 05/15/19  0931 05/15/19  0931 10/10/18  1211 03/26/18  1157 01/16/17  0948 07/19/16  0912 06/06/16  0809 03/21/16  1620 03/21/16  1620   A1C 6.3* 7.5*  --  6.6*   < > 6.4*   < > 6.9*   < > 6.4*  --    < > 6.4*   *  --   --  139*  --  127*   --   --    < >  --   --   --   --    HDL 56  --   --  57  --  50  --   --    < >  --   --   --   --    TRIG 237*  --   --  310*  --  264*  --   --    < >  --   --   --   --    ALT  --   --   --   --   --   --   --   --   --  75* 74*  --  133*   CR 0.84 1.04   < > 0.88   < > 0.95   < >  --    < > 0.92 0.82   < > 0.88   GFRESTIMATED >90 76   < > >90   < > 85   < >  --    < > 84 >90  Non  GFR Calc     < > 89   GFRESTBLACK  --  88  --  >90   < > >90   < >  --    < > >90   GFR Calc   >90   GFR Calc     < > >90   GFR Calc     POTASSIUM 4.0 4.1   < > 4.8   < > 4.2   < >  --    < > 4.4 4.2   < > 4.2   TSH  --   --   --   --   --   --   --  1.96  --   --   --   --  2.54    < > = values in this interval not displayed.        Reviewed and updated as needed this visit by clinical staff  Tobacco  Allergies  Meds   Med Hx  Surg Hx  Fam Hx  Soc Hx        Reviewed and updated as needed this visit by Provider                Past Medical History:   Diagnosis Date     BMI 39.0-39.9,adult      Fatigue      GERD (gastroesophageal reflux disease)      Helicobacter pylori 1999     Hyperlipidemia LDL goal < 130      Hypertension      Lyme disease 06/2003     LISETTE (obstructive sleep apnea)-Moderate (AHI 21) 2/3/2012     Seasonal allergies      Tobacco abuse       Past Surgical History:   Procedure Laterality Date     KNEE SURGERY       ORTHOPEDIC SURGERY  2009    Right knee     ORTHOPEDIC SURGERY  1970's    Right ankle     rib surgery  1982    Top right side rib removed       Review of Systems   Constitutional: Negative for chills and fever.   HENT: Positive for congestion, ear pain, hearing loss and sore throat.    Eyes: Positive for visual disturbance. Negative for pain.   Respiratory: Positive for cough and shortness of breath.    Cardiovascular: Positive for peripheral edema. Negative for chest pain and palpitations.   Gastrointestinal: Positive for diarrhea. Negative  "for abdominal pain, constipation, heartburn, hematochezia and nausea.   Genitourinary: Positive for frequency, impotence and urgency. Negative for discharge, dysuria, genital sores and hematuria.   Musculoskeletal: Positive for arthralgias. Negative for joint swelling and myalgias.   Skin: Negative for rash.   Neurological: Positive for dizziness, weakness and headaches. Negative for paresthesias.   Psychiatric/Behavioral: Negative for mood changes. The patient is not nervous/anxious.      CONSTITUTIONAL: NEGATIVE for fever, chills, change in weight  INTEGUMENTARY/SKIN: NEGATIVE for worrisome rashes, moles or lesions  EYES: NEGATIVE for vision changes or irritation  ENT: NEGATIVE for ear, mouth and throat problems  RESP:NEGATIVE for significant cough or SOB and Hx COPD  CV: NEGATIVE for chest pain, palpitations or peripheral edema  GI: NEGATIVE for nausea, abdominal pain, heartburn, or change in bowel habits   male: negative for dysuria, hematuria, decreased urinary stream, erectile dysfunction, urethral discharge  MUSCULOSKELETAL: NEGATIVE for significant arthralgias or myalgia  NEURO: NEGATIVE for weakness, dizziness or paresthesias  PSYCHIATRIC: NEGATIVE for changes in mood or affect    OBJECTIVE:   /82   Pulse 76   Temp 98.8  F (37.1  C) (Tympanic)   Resp 24   Ht 1.759 m (5' 9.25\")   Wt 130.9 kg (288 lb 9.6 oz)   SpO2 93%   BMI 42.31 kg/m      Physical Exam  GENERAL: healthy, alert and no distress  EYES: Eyes grossly normal to inspection, PERRL and conjunctivae and sclerae normal  HENT: ear canals and TM's normal, nose and mouth without ulcers or lesions  NECK: no adenopathy, no asymmetry, masses, or scars and thyroid normal to palpation  RESP: rhonchi throughout, expiratory wheezes throughout and inspiratory wheezes throughout  CV: regular rate and rhythm, normal S1 S2, no S3 or S4, no murmur, click or rub, no peripheral edema and peripheral pulses strong  ABDOMEN: soft, nontender, no " "hepatosplenomegaly, no masses and bowel sounds normal  MS: no gross musculoskeletal defects noted, no edema  SKIN: no suspicious lesions or rashes  NEURO: Normal strength and tone, mentation intact and speech normal  PSYCH: mentation appears normal, affect normal/bright    Diagnostic Test Results:  Labs reviewed in Epic    ASSESSMENT/PLAN:       ICD-10-CM    1. Routine general medical examination at a health care facility  Z00.00    2. Type 2 diabetes mellitus with microalbuminuria, without long-term current use of insulin (H)  E11.29 OPTOMETRY REFERRAL    R80.9 FOOT EXAM     metFORMIN (GLUCOPHAGE) 500 MG tablet     CANCELED: Albumin Random Urine Quantitative with Creat Ratio   3. Essential hypertension with goal blood pressure less than 140/90  I10    4. COPD exacerbation (H)  J44.1 XR Chest 2 Views     predniSONE (DELTASONE) 10 MG tablet     doxycycline hyclate (VIBRA-TABS) 100 MG tablet     albuterol (PROAIR HFA/PROVENTIL HFA/VENTOLIN HFA) 108 (90 Base) MCG/ACT inhaler   5. Morbid obesity (H)  E66.01    6. Hyperlipidemia LDL goal <130  E78.5 ezetimibe (ZETIA) 10 MG tablet     Advised supportive and symptomatic treatment.  Follow up with Provider - if condition persists or worsens.   work on lifestyle modification  Recheck in 6 mos .   Patient has been advised of split billing requirements and indicates understanding: Yes  COUNSELING:   Reviewed preventive health counseling, as reflected in patient instructions       Regular exercise       Healthy diet/nutrition    Estimated body mass index is 42.31 kg/m  as calculated from the following:    Height as of this encounter: 1.759 m (5' 9.25\").    Weight as of this encounter: 130.9 kg (288 lb 9.6 oz).     Weight management plan: Discussed healthy diet and exercise guidelines    He reports that he quit smoking about 5 years ago. His smoking use included cigarettes. He has a 42.00 pack-year smoking history. He has never used smokeless tobacco.      Counseling " Resources:  ATP IV Guidelines  Pooled Cohorts Equation Calculator  FRAX Risk Assessment  ICSI Preventive Guidelines  Dietary Guidelines for Americans, 2010  USDA's MyPlate  ASA Prophylaxis  Lung CA Screening    [facility]HA Staley [facility]

## 2021-11-17 DIAGNOSIS — I10 ESSENTIAL HYPERTENSION WITH GOAL BLOOD PRESSURE LESS THAN 140/90: ICD-10-CM

## 2021-11-17 DIAGNOSIS — R80.9 TYPE 2 DIABETES MELLITUS WITH MICROALBUMINURIA, WITHOUT LONG-TERM CURRENT USE OF INSULIN (H): ICD-10-CM

## 2021-11-17 DIAGNOSIS — E11.29 TYPE 2 DIABETES MELLITUS WITH MICROALBUMINURIA, WITHOUT LONG-TERM CURRENT USE OF INSULIN (H): ICD-10-CM

## 2021-11-19 RX ORDER — ASPIRIN 81 MG/1
TABLET, COATED ORAL
Qty: 90 TABLET | Refills: 2 | Status: SHIPPED | OUTPATIENT
Start: 2021-11-19

## 2021-11-19 RX ORDER — AMLODIPINE BESYLATE 10 MG/1
TABLET ORAL
Qty: 45 TABLET | Refills: 0 | Status: SHIPPED | OUTPATIENT
Start: 2021-11-19 | End: 2022-02-24

## 2021-11-19 RX ORDER — LOSARTAN POTASSIUM AND HYDROCHLOROTHIAZIDE 12.5; 1 MG/1; MG/1
TABLET ORAL
Qty: 90 TABLET | Refills: 0 | Status: SHIPPED | OUTPATIENT
Start: 2021-11-19 | End: 2022-02-24

## 2021-12-08 ENCOUNTER — TELEPHONE (OUTPATIENT)
Dept: FAMILY MEDICINE | Facility: CLINIC | Age: 66
End: 2021-12-08
Payer: COMMERCIAL

## 2021-12-08 NOTE — TELEPHONE ENCOUNTER
"I called patient for more information.     Stephon states he finished the Doxycycline and Prednisone in early October. He felt better for a few weeks.     By early November he had developed a cold.   Currently, he says he has a 2nd cold and is getting worse again.     Patient states that he is wheezing all day. He uses his Albuterol 1 puff about 8 times a day. He does not use a space with his inhaler.   No fever. He has some rib pain from coughing. He sleeps fair at night with his CPAP.   He reports his lungs are congested and he is coughing up phlegm frequently.     I offered patient an appointment with a \"Same Day\" provider tomorrow, however Stephon declined. He said he's had this the last 12 years and Max said to call if he had not improved.     Patient is not currently in [place], however said he would be willing to do a phone visit with Max if absolutely necessary.     Preferred pharmacy ([facility] [place]).     Starla Moreno RN BSN  [facility]    "

## 2021-12-08 NOTE — TELEPHONE ENCOUNTER
Patient states his bronchitis never went away. Asking for a prescription to be sent to [facility].

## 2021-12-09 NOTE — TELEPHONE ENCOUNTER
"Patient contacted and Virtual Visit scheduled with Max Orantes PA-C tomorrow. Patient informed that he will be \"worked in\" to the schedule.     Starla Moreno RN BSN  [facility]    "

## 2021-12-10 ENCOUNTER — VIRTUAL VISIT (OUTPATIENT)
Dept: FAMILY MEDICINE | Facility: CLINIC | Age: 66
End: 2021-12-10
Payer: COMMERCIAL

## 2021-12-10 DIAGNOSIS — N18.2 CHRONIC KIDNEY DISEASE, STAGE 2 (MILD): ICD-10-CM

## 2021-12-10 DIAGNOSIS — J44.1 COPD EXACERBATION (H): Primary | ICD-10-CM

## 2021-12-10 PROCEDURE — 99213 OFFICE O/P EST LOW 20 MIN: CPT | Mod: 95 | Performed by: PHYSICIAN ASSISTANT

## 2021-12-10 RX ORDER — ALBUTEROL SULFATE 90 UG/1
2 AEROSOL, METERED RESPIRATORY (INHALATION) EVERY 6 HOURS
Qty: 18 G | Refills: 5 | Status: SHIPPED | OUTPATIENT
Start: 2021-12-10

## 2021-12-10 RX ORDER — DOXYCYCLINE HYCLATE 100 MG
100 TABLET ORAL 2 TIMES DAILY
Qty: 28 TABLET | Refills: 0 | Status: SHIPPED | OUTPATIENT
Start: 2021-12-10 | End: 2021-12-24

## 2021-12-10 RX ORDER — PREDNISONE 10 MG/1
TABLET ORAL
Qty: 42 TABLET | Refills: 0 | Status: SHIPPED | OUTPATIENT
Start: 2021-12-10

## 2021-12-10 NOTE — PROGRESS NOTES
Stephon is a 66 year old who is being evaluated via a billable telephone visit.      What phone number would you like to be contacted at? [phone]  How would you like to obtain your AVS? Jinny Salinas   Stephon is a 66 year old who presents for the following health issues     HPI     Acute Illness - saw Max 09/2021  Acute illness concerns: cough/sinus  Onset/Duration: 3 mos  Symptoms:  Fever: no  Chills/Sweats: YES  Headache (location?): YES  Sinus Pressure: YES  Conjunctivitis:  no  Ear Pain: YES: bilateral  Rhinorrhea: YES  Congestion: YES  Sore Throat: YES  Cough: YES-productive of yellow sputum  Wheeze: YES  Decreased Appetite: no  Nausea: no  Vomiting: no  Diarrhea: YES  Dysuria/Freq.: no  Dysuria or Hematuria: no  Fatigue/Achiness: no  Sick/Strep Exposure: no  Therapies tried and outcome: Nyquil    Cough. Some wheezing and rhonchi. No fevers.       Review of Systems   Constitutional, HEENT, cardiovascular, pulmonary, GI, , musculoskeletal, neuro, skin, endocrine and psych systems are negative, except as otherwise noted.      Objective           Vitals:  No vitals were obtained today due to virtual visit.    Physical Exam   healthy, alert and no distress  PSYCH: Alert and oriented times 3; coherent speech, normal   rate and volume, able to articulate logical thoughts, able   to abstract reason, no tangential thoughts, no hallucinations   or delusions  His affect is normal  RESP: No cough, no audible wheezing, able to talk in full sentences  Remainder of exam unable to be completed due to telephone visits    Cooper was seen today for cough.    Diagnoses and all orders for this visit:    COPD exacerbation (H)  -     doxycycline hyclate (VIBRA-TABS) 100 MG tablet; Take 1 tablet (100 mg) by mouth 2 times daily for 14 days  -     predniSONE (DELTASONE) 10 MG tablet; Take 60 mg days 1 and 2, 50 mg days 3 and 4, 40 mg days 5 and 6, 30 mg days 7 and 8, 20 mg days 9 and 10, 10 mg days 11 and 12  -      albuterol (PROAIR HFA/PROVENTIL HFA/VENTOLIN HFA) 108 (90 Base) MCG/ACT inhaler; Inhale 2 puffs into the lungs every 6 hours    Chronic kidney disease, stage 2 (mild)      Advised supportive and symptomatic treatment.  Follow up with Provider - if condition persists or worsens.           Phone call duration: 6 minutes

## 2022-02-21 DIAGNOSIS — I10 ESSENTIAL HYPERTENSION WITH GOAL BLOOD PRESSURE LESS THAN 140/90: ICD-10-CM

## 2022-02-21 DIAGNOSIS — E11.29 TYPE 2 DIABETES MELLITUS WITH MICROALBUMINURIA, WITHOUT LONG-TERM CURRENT USE OF INSULIN (H): ICD-10-CM

## 2022-02-21 DIAGNOSIS — R80.9 TYPE 2 DIABETES MELLITUS WITH MICROALBUMINURIA, WITHOUT LONG-TERM CURRENT USE OF INSULIN (H): ICD-10-CM

## 2022-02-24 RX ORDER — AMLODIPINE BESYLATE 10 MG/1
TABLET ORAL
Qty: 45 TABLET | Refills: 0 | Status: SHIPPED | OUTPATIENT
Start: 2022-02-24

## 2022-02-24 RX ORDER — LOSARTAN POTASSIUM AND HYDROCHLOROTHIAZIDE 12.5; 1 MG/1; MG/1
TABLET ORAL
Qty: 90 TABLET | Refills: 0 | Status: SHIPPED | OUTPATIENT
Start: 2022-02-24

## 2022-02-24 NOTE — TELEPHONE ENCOUNTER
Medication is being filled for 1 time refill only due to:  Patient needs to be seen because needs recheck.     Return in about 6 months (around 3/23/2022) for diabetes recheck.

## 2022-04-09 ENCOUNTER — HEALTH MAINTENANCE LETTER (OUTPATIENT)
Age: 67
End: 2022-04-09

## 2022-10-09 ENCOUNTER — HEALTH MAINTENANCE LETTER (OUTPATIENT)
Age: 67
End: 2022-10-09

## 2023-05-21 ENCOUNTER — HEALTH MAINTENANCE LETTER (OUTPATIENT)
Age: 68
End: 2023-05-21

## 2024-01-06 ENCOUNTER — HEALTH MAINTENANCE LETTER (OUTPATIENT)
Age: 69
End: 2024-01-06

## 2024-08-03 ENCOUNTER — HEALTH MAINTENANCE LETTER (OUTPATIENT)
Age: 69
End: 2024-08-03